# Patient Record
Sex: MALE | Race: WHITE | NOT HISPANIC OR LATINO | Employment: FULL TIME | URBAN - NONMETROPOLITAN AREA
[De-identification: names, ages, dates, MRNs, and addresses within clinical notes are randomized per-mention and may not be internally consistent; named-entity substitution may affect disease eponyms.]

---

## 2017-01-04 ENCOUNTER — TREATMENT (OUTPATIENT)
Dept: PHYSICAL THERAPY | Facility: CLINIC | Age: 42
End: 2017-01-04

## 2017-01-04 DIAGNOSIS — M54.40 LOW BACK PAIN WITH SCIATICA, SCIATICA LATERALITY UNSPECIFIED, UNSPECIFIED BACK PAIN LATERALITY, UNSPECIFIED CHRONICITY: Primary | ICD-10-CM

## 2017-01-04 PROCEDURE — 97014 ELECTRIC STIMULATION THERAPY: CPT | Performed by: PHYSICAL THERAPIST

## 2017-01-04 PROCEDURE — 97012 MECHANICAL TRACTION THERAPY: CPT | Performed by: PHYSICAL THERAPIST

## 2017-01-04 PROCEDURE — 97110 THERAPEUTIC EXERCISES: CPT | Performed by: PHYSICAL THERAPIST

## 2017-01-04 NOTE — MR AVS SNAPSHOT
Suhail Gamino   2017 2:45 PM   Treatment    Dept Phone:  [phone]   Encounter #:  [id number]    Provider:  Rodriguez Mauricio PTA   Department:  [facility] PHYSICAL THERAPY                Your Full Care Plan              Your Updated Medication List          This list is accurate as of: 17  4:07 PM.  Always use your most recent med list.                clonazePAM 2 MG tablet   Commonly known as:  KlonoPIN   Take 1 tablet by mouth 3 (Three) Times a Day.       cyclobenzaprine 10 MG tablet   Commonly known as:  FLEXERIL   TAKE 1 TABLET BY MOUTH THREE TIMES A DAY AS NEEDED       fexofenadine 180 MG tablet   Commonly known as:  ALLEGRA       HYDROcodone-acetaminophen 7.5-325 MG per tablet   Commonly known as:  NORCO   Take 1 tablet by mouth Every 6 (Six) Hours As Needed for moderate pain (4-6).       meloxicam 15 MG tablet   Commonly known as:  MOBIC   TAKE 1 TABLET BY MOUTH DAILY               You Were Diagnosed With        Codes Comments    Low back pain with sciatica, sciatica laterality unspecified, unspecified back pain laterality, unspecified chronicity    -  Primary ICD-10-CM: M54.40  ICD-9-CM: 724.3       Instructions     None    Patient Instructions History      Upcoming Appointments     Visit Type Date Time Department    TREATMENT 2017  2:45 PM [facility]    TREATMENT 2017  2:45 PM [facility]    OFFICE VISIT 2/3/2017  3:00 PM [facility] GAVINO JEWELL      Fantom Signup     [facility] Fantom allows you to send messages to your doctor, view your test results, renew your prescriptions, schedule appointments, and more. To sign up, go to [facility] and click on the Sign Up Now link in the New User? box. Enter your Fantom Activation Code exactly as it appears below along with the last four digits of your Social Security Number and your Date of Birth () to complete the sign-up process. If you do not sign up before the  expiration date, you must request a new code.    [employer]pple Activation Code: [email or web address]  Expires: 1/18/2017  2:57 PM    If you have questions, you can email [email or web address] or call [phone] to talk to our Burpple staff. Remember, [employer] is NOT to be used for urgent needs. For medical emergencies, dial 911.               Other Info from Your Visit           Your Appointments     Jan 06, 2017  2:45 PM CST   Therapy Treatment with Rodriguez Mauricio PTA   [facility] PHYSICAL THERAPY (--)    [facility] PHYSICAL THERAPY (--)   [phone]            Feb 03, 2017  3:00 PM CST   Office Visit with Andres Carrera MD   [facility] (--)    [address] [place] [address] [phone]           Arrive 15 minutes prior to appointment.              Allergies     No Known Allergies      Vital Signs     Smoking Status                   Former Smoker           Problems and Diagnoses Noted     Lumbago with sciatica    -  Primary

## 2017-01-04 NOTE — PROGRESS NOTES
"Daily Progress Note    Time In: 3:07      Time Out: 4:08    ICD-10-CM ICD-9-CM   1. Low back pain with sciatica, sciatica laterality unspecified, unspecified back pain laterality, unspecified chronicity M54.40 724.3       Subjective   Pt c/o LBP into L glute area. He has been working.   pain is perceived as severe (6-8 pain scale)      Objective     Reported body wt of 309#. TTP at L PS, QL area. V cues necessary for correct TE performance.    PROCEDURES AND MODALITIES:       Electrical Stimulation  Stimulation Type: IFC  Max mAmp: 15  Location/Electrode Placement/Other: LB  Rx Minutes: 15 mins    Traction 40998  Traction Type: Lumbar  Rx Minutes: 12  Duration: Intermittent  Position: Hook-lying  Weight: 140 (relax 80#)  Hold: 60  Relax: 10    EXERCISE  Exercise 1  Exercise Name 1: LTR  Sets/Reps 1: 2/20\"  Exercise 1 Completed: Yes  Exercise 2  Exercise Name 2: B lat hang stretch  Sets/Reps 2: 3/15\"  Exercise 2 Completed: Yes Exercise 3  Exercise Name 3: Prone heel squeeze  Sets/Reps 3: 20x  Exercise 3 Completed: Yes Exercise 4  Exercise Name 4: STM with bal  Sets/Reps 4: 3 min  Exercise 4 Completed: Yes Exercise 5  Exercise Name 5: L SKC  Sets/Reps 5: 2/30sec  Exercise 5 Completed: Yes Exercise 6  Exercise Name 6: B joseluis stretch  Sets/Reps 6: 2/30\"  Exercise 6 Completed: Yes                                               Therapy Exercise 89733 34 minutes and Other Procedure CPT 27 minutes Traction and electrical Stimulation    Total Treatment Time: 61 Minutes    Assessment/Plan   Good tolerance of today's treatment. Good mary of added traction. Continued LB reactivity. Pain decreased post treatment,   Progress per Plan of Care             Rodriguez Mauricio, PTA  Physical Therapist    "

## 2017-01-06 ENCOUNTER — TREATMENT (OUTPATIENT)
Dept: PHYSICAL THERAPY | Facility: CLINIC | Age: 42
End: 2017-01-06

## 2017-01-06 DIAGNOSIS — M54.40 LOW BACK PAIN WITH SCIATICA, SCIATICA LATERALITY UNSPECIFIED, UNSPECIFIED BACK PAIN LATERALITY, UNSPECIFIED CHRONICITY: Primary | ICD-10-CM

## 2017-01-06 PROCEDURE — 97110 THERAPEUTIC EXERCISES: CPT | Performed by: PHYSICAL THERAPIST

## 2017-01-06 PROCEDURE — 97012 MECHANICAL TRACTION THERAPY: CPT | Performed by: PHYSICAL THERAPIST

## 2017-01-06 PROCEDURE — 97014 ELECTRIC STIMULATION THERAPY: CPT | Performed by: PHYSICAL THERAPIST

## 2017-01-06 NOTE — PROGRESS NOTES
"Daily Progress Note    Time In: 2:40      Time Out: 3:35    ICD-10-CM ICD-9-CM   1. Low back pain with sciatica, sciatica laterality unspecified, unspecified back pain laterality, unspecified chronicity M54.40 724.3       Subjective   Pt reports 5/10 pain today at same area. One episode of neuro symptom all the way down leg when stepping down out of truck. This resolved quickly and these are happening less frequently.       Objective     V cues necessary for correct TE performance. No c/o increased pain with TE or traction. Pain decreased post treatment.       PROCEDURES AND MODALITIES:     Electrical Stimulation  Stimulation Type: IFC  Max mAmp: 15  Location/Electrode Placement/Other: LB  Rx Minutes: 15 mins       Traction 68107  Traction Type: Lumbar  Rx Minutes: 12  Duration: Intermittent  Position: Hook-lying  Weight: 142  Hold: 60  Relax: 10    EXERCISE  Exercise 1  Exercise Name 1: B lat hang stretch  Sets/Reps 1: 3/15\"  Exercise 1 Completed: Yes  Exercise 2  Exercise Name 2: Kneeling L ant hip stretch  Sets/Reps 2: 2/30\"  Exercise 2 Completed: Yes Exercise 3  Exercise Name 3: L piriformis stretch  Sets/Reps 3: 2/30\"  Exercise 3 Completed: Yes Exercise 4  Exercise Name 4: Bridge with add squeeze  Sets/Reps 4: 30x  Exercise 4 Completed: Yes Exercise 5  Exercise Name 5: 1/2 H/L ab curl with hold  Sets/Reps 5: 2/10x  Exercise 5 Completed: Yes Exercise 6  Exercise Name 6: Quad alt hip ext with hold  Sets/Reps 6: 12xea  Exercise 6 Completed: Yes                                               Therapy Exercise 75653 28 minutes and Other Procedure CPT 27 minutes Traction and Electrical Stimulation    Total Treatment Time: 55 Minutes    Assessment/Plan   Good tolerance of today's treatment. Able to increase traction and TE intensity with no problems. LE symptom frequency improved.   Progress per Plan of Care             Rodriguez Mauricio, PTA  Physical Therapist    "

## 2017-01-09 ENCOUNTER — TREATMENT (OUTPATIENT)
Dept: PHYSICAL THERAPY | Facility: CLINIC | Age: 42
End: 2017-01-09

## 2017-01-09 DIAGNOSIS — M54.40 ACUTE MIDLINE LOW BACK PAIN WITH SCIATICA, SCIATICA LATERALITY UNSPECIFIED: Primary | ICD-10-CM

## 2017-01-09 PROCEDURE — 97110 THERAPEUTIC EXERCISES: CPT | Performed by: PHYSICAL THERAPIST

## 2017-01-09 PROCEDURE — 97014 ELECTRIC STIMULATION THERAPY: CPT | Performed by: PHYSICAL THERAPIST

## 2017-01-09 PROCEDURE — 97012 MECHANICAL TRACTION THERAPY: CPT | Performed by: PHYSICAL THERAPIST

## 2017-01-09 NOTE — PROGRESS NOTES
"Daily Progress Note    Time In: 1510      Time Out: 1625    ICD-10-CM ICD-9-CM   1. Acute midline low back pain with sciatica, sciatica laterality unspecified M54.40 724.2     724.3       Subjective   Pt states his pain is in low back and L hip.  Patient reports to therapy 4/10 pain on numerical analogue scale, and   0% improvement.  Attendance  5/6 visits. 6 approved by insurance.      Objective    NAD, TTP to L piriformis    AROM:           PROCEDURES AND MODALITIES:                 Electrical Stimulation  Stimulation Type: IFC  Location/Electrode Placement/Other: LB  Rx Minutes: 15 mins              Traction 29634  Traction Type: Lumbar  Rx Minutes: 15  Duration: Intermittent  Position: Hook-lying  Weight: 75  Hold: 60  Relax: 10    EXERCISE  Exercise 1  Exercise Name 1: Pro 2  Equipment/Resistance 1: L4  Time: 8 min  Exercise 1 Completed: Yes  Exercise 2  Exercise Name 2: bridge with ADD  Sets/Reps 2: 20  Time 2: 5\" hold  Exercise 2 Completed: Yes Exercise 3  Exercise Name 3: LTR with ADD  Sets/Reps 3: 20x  Time 3: 5\" hold  Exercise 3 Completed: Yes Exercise 4  Exercise Name 4: ST Hs stretch  Sets/Reps 4: 3x30\"  Exercise 4 Completed: Yes Exercise 5  Exercise Name 5: L joseluis stretch  Sets/Reps 5: 3x30  Exercise 5 Completed: Yes Exercise 6  Exercise Name 6: DKTC with PB  Sets/Reps 6: 20x  Exercise 6 Completed: Yes   Exercise 7  Exercise Name 7: SLR L  Sets/Reps 7: 2x10  Exercise 7 Completed: Yes Exercise 8  Exercise Name 8: prone heel squeeze  Sets/Reps 8: 20x  Exercise 8 Completed: Yes                                       MANUAL PT:                           Therapy Exercise 62197 40 minutes    Total Treatment Time: 75 Minutes    Assessment/Plan    Pt reports compliant with HEP.  No adverse effects with new exercise this date. Pt progressing well to meet unmet goals. Pt gets some relief from traction.  Progress per Plan of Care   Last approved visit           Charly Espinal PTA  Physical Therapist  Jeni MUNOZ " José Miguel, PT, ATC, DPT

## 2017-01-09 NOTE — MR AVS SNAPSHOT
Suhail Gamino   2017 2:30 PM   Treatment    Dept Phone:  [phone]   Encounter #:  [id number]    Provider:  PTA FLOAT POW   Department:  [facility] PHYSICAL THERAPY                Your Full Care Plan              Your Updated Medication List          This list is accurate as of: 17  4:24 PM.  Always use your most recent med list.                clonazePAM 2 MG tablet   Commonly known as:  KlonoPIN   Take 1 tablet by mouth 3 (Three) Times a Day.       cyclobenzaprine 10 MG tablet   Commonly known as:  FLEXERIL   TAKE 1 TABLET BY MOUTH THREE TIMES A DAY AS NEEDED       fexofenadine 180 MG tablet   Commonly known as:  ALLEGRA       HYDROcodone-acetaminophen 7.5-325 MG per tablet   Commonly known as:  NORCO   Take 1 tablet by mouth Every 6 (Six) Hours As Needed for moderate pain (4-6).       meloxicam 15 MG tablet   Commonly known as:  MOBIC   TAKE 1 TABLET BY MOUTH DAILY               You Were Diagnosed With        Codes Comments    Acute midline low back pain with sciatica, sciatica laterality unspecified    -  Primary ICD-10-CM: M54.40  ICD-9-CM: 724.2, 724.3       Instructions     None    Patient Instructions History      Upcoming Appointments     Visit Type Date Time Department    TREATMENT 2017  2:30 PM [facility]    TREATMENT 2017  2:30 PM [facility]    OFFICE VISIT 2/3/2017  3:00 PM [facility] GAVINO JEWELL      BeDo Signup     [facility] BeDo allows you to send messages to your doctor, view your test results, renew your prescriptions, schedule appointments, and more. To sign up, go to [employer] and click on the Sign Up Now link in the New User? box. Enter your BeDo Activation Code exactly as it appears below along with the last four digits of your Social Security Number and your Date of Birth () to complete the sign-up process. If you do not sign up before the expiration date, you must request a new  code.    SafeTool Activation Code: [email or web address]  Expires: 1/18/2017  2:57 PM    If you have questions, you can email [facility] Care[facility]'s Hospital at [email or web address] or call [phone] to talk to our Buildingeyet staff. Remember, leemailhart is NOT to be used for urgent needs. For medical emergencies, dial 911.               Other Info from Your Visit           Your Appointments     Jan 11, 2017  2:30 PM CST   Therapy Treatment with Rodriguez Mauricio PTA   [facility] PHYSICAL THERAPY (--)    [facility] PHYSICAL THERAPY (--)   [phone]            Feb 03, 2017  3:00 PM CST   Office Visit with Andres Carrera MD   [facility] (--)    [facility] [address] [place] [address] [phone]           Arrive 15 minutes prior to appointment.              Allergies     No Known Allergies      Vital Signs     Smoking Status                   Former Smoker           Problems and Diagnoses Noted     Acute midline low back pain with sciatica, sciatica laterality unspecified    -  Primary

## 2017-01-11 ENCOUNTER — TREATMENT (OUTPATIENT)
Dept: PHYSICAL THERAPY | Facility: CLINIC | Age: 42
End: 2017-01-11

## 2017-01-11 DIAGNOSIS — M54.40 LOW BACK PAIN WITH SCIATICA, SCIATICA LATERALITY UNSPECIFIED, UNSPECIFIED BACK PAIN LATERALITY, UNSPECIFIED CHRONICITY: ICD-10-CM

## 2017-01-11 DIAGNOSIS — M54.40 ACUTE MIDLINE LOW BACK PAIN WITH SCIATICA, SCIATICA LATERALITY UNSPECIFIED: Primary | ICD-10-CM

## 2017-01-11 PROCEDURE — 97012 MECHANICAL TRACTION THERAPY: CPT | Performed by: PHYSICAL THERAPIST

## 2017-01-11 PROCEDURE — 97014 ELECTRIC STIMULATION THERAPY: CPT | Performed by: PHYSICAL THERAPIST

## 2017-01-11 PROCEDURE — 97110 THERAPEUTIC EXERCISES: CPT | Performed by: PHYSICAL THERAPIST

## 2017-01-11 NOTE — MR AVS SNAPSHOT
Suhail Gamino   1/11/2017 2:30 PM   Treatment    Dept Phone:  [phone]   Encounter #:  [id number]    Provider:  Rodriguez Mauricio PTA   Department:  [facility] PHYSICAL THERAPY                Your Full Care Plan              Your Updated Medication List          This list is accurate as of: 1/11/17  4:20 PM.  Always use your most recent med list.                clonazePAM 2 MG tablet   Commonly known as:  KlonoPIN   Take 1 tablet by mouth 3 (Three) Times a Day.       cyclobenzaprine 10 MG tablet   Commonly known as:  FLEXERIL   TAKE 1 TABLET BY MOUTH THREE TIMES A DAY AS NEEDED       fexofenadine 180 MG tablet   Commonly known as:  ALLEGRA       HYDROcodone-acetaminophen 7.5-325 MG per tablet   Commonly known as:  NORCO   Take 1 tablet by mouth Every 6 (Six) Hours As Needed for moderate pain (4-6).       meloxicam 15 MG tablet   Commonly known as:  MOBIC   TAKE 1 TABLET BY MOUTH DAILY               You Were Diagnosed With        Codes Comments    Acute midline low back pain with sciatica, sciatica laterality unspecified    -  Primary ICD-10-CM: M54.40  ICD-9-CM: 724.2, 724.3     Low back pain with sciatica, sciatica laterality unspecified, unspecified back pain laterality, unspecified chronicity     ICD-10-CM: M54.40  ICD-9-CM: 724.3       Instructions     None    Patient Instructions History      Upcoming Appointments     Visit Type Date Time Department    TREATMENT 1/11/2017  2:30 PM [facility]    OFFICE VISIT 2/3/2017  3:00 PM [facility] GAVINO JEWELL      [employer] Signup     [facility] OurStory allows you to send messages to your doctor, view your test results, renew your prescriptions, schedule appointments, and more. To sign up, go to [employer] and click on the Sign Up Now link in the New User? box. Enter your OurStory Activation Code exactly as it appears below along with the last four digits of your Social Security Number and your Date of  Birth () to complete the sign-up process. If you do not sign up before the expiration date, you must request a new code.    [employer] Activation Code: [email or web address]  Expires: 2017  2:57 PM    If you have questions, you can email [email or web address] or call [phone] to talk to our [employer] staff. Remember, [employer] is NOT to be used for urgent needs. For medical emergencies, dial 911.               Other Info from Your Visit           Your Appointments     2017  3:00 PM CST   Office Visit with Andres Carrera MD   [facility] FAMILY MEDICINE (--)    [address] [place] [address] [phone]           Arrive 15 minutes prior to appointment.              Allergies     No Known Allergies      Vital Signs     Smoking Status                   Former Smoker           Problems and Diagnoses Noted     Acute midline low back pain with sciatica, sciatica laterality unspecified    -  Primary    Lumbago with sciatica

## 2017-01-11 NOTE — PROGRESS NOTES
"Daily Progress Note    Time In: 3:00      Time Out: 3:58    ICD-10-CM ICD-9-CM   1. Acute midline low back pain with sciatica, sciatica laterality unspecified M54.40 724.2     724.3   2. Low back pain with sciatica, sciatica laterality unspecified, unspecified back pain laterality, unspecified chronicity M54.40 724.3       Subjective   Pt reports mild L hip symptom once today with getting out of truck. His overall LE symptoms have greatly improved.   pain is perceived as mild(1-3  pain scale)      Objective     V cues necessary for correct TE performance. No c/o increased symptoms with or post TE.       PROCEDURES AND MODALITIES:       Electrical Stimulation  Stimulation Type: IFC  Location/Electrode Placement/Other: LB  Rx Minutes: Other: (12 min)       Traction 08633  Traction Type: Lumbar  Rx Minutes: 14  Position: Hook-lying  Weight: 145  Hold: 60  Relax: 10    EXERCISE  Exercise 1  Exercise Name 1: L piriformis stretch  Sets/Reps 1: 2/30 sec  Exercise 1 Completed: Yes  Exercise 2  Exercise Name 2: LTR  Sets/Reps 2: 2/30\"  Exercise 2 Completed: Yes Exercise 3  Exercise Name 3: PPU  Sets/Reps 3: 2/10x  Exercise 3 Completed: Yes Exercise 4  Exercise Name 4: 1/2 H/L ab curl  Sets/Reps 4: 2/10x  Exercise 4 Completed: Yes Exercise 5  Exercise Name 5: Quad alt hip ext  Equipment/Resistance 5: with hold  Sets/Reps 5: 12x ea  Exercise 5 Completed: Yes Exercise 6  Exercise Name 6: Core anti rot  Sets/Reps 6: 10xea  Exercise 6 Completed: Yes   Exercise 7  Exercise Name 7: ST HS stretch  Sets/Reps 7: 1 min ea  Exercise 7 Completed: Yes                                         Therapy Exercise 59765 32 minutes and Other Procedure CPT 26 minutes Traction and Electrical Stimulation    Total Treatment Time: 58 Minutes    Assessment/Plan   Good tolerance of today's treatment. Neuro symptom improved along with pain.    Progress per Plan of Care, when visits approved.              Rodriguez Mauricio PTA  Physical Therapist    "

## 2017-01-18 ENCOUNTER — TREATMENT (OUTPATIENT)
Dept: PHYSICAL THERAPY | Facility: CLINIC | Age: 42
End: 2017-01-18

## 2017-01-18 DIAGNOSIS — M54.40 ACUTE MIDLINE LOW BACK PAIN WITH SCIATICA, SCIATICA LATERALITY UNSPECIFIED: Primary | ICD-10-CM

## 2017-01-18 DIAGNOSIS — M54.40 LOW BACK PAIN WITH SCIATICA, SCIATICA LATERALITY UNSPECIFIED, UNSPECIFIED BACK PAIN LATERALITY, UNSPECIFIED CHRONICITY: ICD-10-CM

## 2017-01-18 PROCEDURE — 97014 ELECTRIC STIMULATION THERAPY: CPT | Performed by: PHYSICAL THERAPIST

## 2017-01-18 PROCEDURE — 97110 THERAPEUTIC EXERCISES: CPT | Performed by: PHYSICAL THERAPIST

## 2017-01-18 PROCEDURE — 97012 MECHANICAL TRACTION THERAPY: CPT | Performed by: PHYSICAL THERAPIST

## 2017-01-18 PROCEDURE — 97164 PT RE-EVAL EST PLAN CARE: CPT | Performed by: PHYSICAL THERAPIST

## 2017-01-18 NOTE — PROGRESS NOTES
Recertification    Diagnosis/ICD-10 Code:  Acute midline low back pain with sciatica, sciatica laterality unspecified [M54.40]  Referring practitioner: Andres Carrera MD  Date of Initial Visit: 1/18/2017  Patient seen for 7 sessions  ADDENDUM: Time In:  15:15   Time out:   16:33         Re-cert, Traction,  Therapeutic exercise 13 mins,  Electrical stimulation  Subjective:   Suhail Gamino states: Patient reports 7/10 pain and 50 % improvement since initiating therapy.  Attendance  7/10 appointments scheduled,  10 approved by insurance. Next MD follow up is  2/2017.  Felt great last week, then he had to do repairs under the house on the duct work.  Pain was ED level all week end but he didn't go. Really hurts, thinks the therapy is helping.      Objective:   Current condition: Good  Test measurement: Flexion is 12 inches from the floor, Extension 20 degrees, lateral flexion to the LJL bilaterally.     5/5 MMT hip flexors and abductors.  Assessment:   Summary of Treatment: Traction 150#/ 140 with 60 /10 cycle.  15 min in supine position.  Core stretches, LTR, piriformis stretch.  Ice and e-stim.  Progress toward previous goals: Continue STG/LTG. Goals 2, 3 met and LTG 2,3, 6 are met.     Pain 4/10 post treatment today.   Plan:   Goals  Short-term goals (STG):  Continue remaining goals from initial consult  Long-term goals (LTG):  Continue remaining goals from initial consult    Timeframe: 3 weeks  Prognosis to achieve goals: fair    Plan  Treatment plan with rationale: The patient is to  be seen 2 time(s) per week, for 2 week(s) to progress toward short and long term goals.  Joint mobilizations to decrease pain and/or increase ROM   Therapeutic exercises to increase functional mobility  Perform modalities as therapeutically necessary to decrease pain and increase mobility  Recommendations: Initiate/continue PT services    PT Signature: Jeni Valdez, PT, ATC,DPT      Based upon review of the patient's  progress and continued therapy plan, it is my medical opinion that Suhail Gamino should continue physical therapy treatment at [facility] PHYSICAL THERAPY  [address] [place] [address] [place] [address].    Signature:  Andres Carrera MD

## 2017-01-18 NOTE — MR AVS SNAPSHOT
Suhail Gamino   1/18/2017 2:45 PM   Treatment    Dept Phone:  [phone]   Encounter #:  [id number]    Provider:  Jeni Valdez PT   Department:  [facility] PHYSICAL THERAPY                Your Full Care Plan              Your Updated Medication List          This list is accurate as of: 1/18/17  4:33 PM.  Always use your most recent med list.                clonazePAM 2 MG tablet   Commonly known as:  KlonoPIN   Take 1 tablet by mouth 3 (Three) Times a Day.       cyclobenzaprine 10 MG tablet   Commonly known as:  FLEXERIL   TAKE 1 TABLET BY MOUTH THREE TIMES A DAY AS NEEDED       fexofenadine 180 MG tablet   Commonly known as:  ALLEGRA       HYDROcodone-acetaminophen 7.5-325 MG per tablet   Commonly known as:  NORCO   Take 1 tablet by mouth Every 6 (Six) Hours As Needed for moderate pain (4-6).       meloxicam 15 MG tablet   Commonly known as:  MOBIC   TAKE 1 TABLET BY MOUTH DAILY               You Were Diagnosed With        Codes Comments    Acute midline low back pain with sciatica, sciatica laterality unspecified    -  Primary ICD-10-CM: M54.40  ICD-9-CM: 724.2, 724.3     Low back pain with sciatica, sciatica laterality unspecified, unspecified back pain laterality, unspecified chronicity     ICD-10-CM: M54.40  ICD-9-CM: 724.3       Instructions     None    Patient Instructions History      Upcoming Appointments     Visit Type Date Time Department    RE-EVALUATION 1/18/2017  2:45 PM [facility]    TREATMENT 1/27/2017  1:45 PM [facility]    OFFICE VISIT 2/3/2017  3:00 PM [facility] GAVINO JEWELL      [place] CK[employer]avet Signup     [facility] [employer] Data[employer]Media allows you to send messages to your doctor, view your test results, renew your prescriptions, schedule appointments, and more. To sign up, go to [employer] and click on the Sign Up Now link in the New User? box. Enter your [employer] Activation Code exactly as it appears below along with the last  four digits of your Social Security Number and your Date of Birth () to complete the sign-up process. If you do not sign up before the expiration date, you must request a new code.    [employer] Activation Code: [email or web address]  Expires: 2017  4:33 PM    If you have questions, you can email [email or web address] or call [phone] to talk to our [employer] staff. Remember, [employer] is NOT to be used for urgent needs. For medical emergencies, dial 911.               Other Info from Your Visit           Your Appointments     2017  1:45 PM CST   Therapy Treatment with PTA FLOAT POW   [facility] PHYSICAL THERAPY (--)    [facility] PHYSICAL THERAPY (--)   [phone]            2017  3:00 PM CST   Office Visit with Andres aCrrera MD   [facility] (--)    [address] [place] [address] [phone]           Arrive 15 minutes prior to appointment.              Allergies     No Known Allergies      Reason for Visit     Lower Back - Pain     PT Re-Evaluation           Vital Signs     Smoking Status                   Former Smoker           Problems and Diagnoses Noted     Acute midline low back pain with sciatica, sciatica laterality unspecified    -  Primary    Lumbago with sciatica

## 2017-01-25 RX ORDER — HYDROCODONE BITARTRATE AND ACETAMINOPHEN 7.5; 325 MG/1; MG/1
1 TABLET ORAL EVERY 6 HOURS PRN
Qty: 120 TABLET | Refills: 0 | Status: SHIPPED | OUTPATIENT
Start: 2017-01-25 | End: 2017-03-20 | Stop reason: SDUPTHER

## 2017-01-27 ENCOUNTER — TREATMENT (OUTPATIENT)
Dept: PHYSICAL THERAPY | Facility: CLINIC | Age: 42
End: 2017-01-27

## 2017-01-27 DIAGNOSIS — M54.40 ACUTE MIDLINE LOW BACK PAIN WITH SCIATICA, SCIATICA LATERALITY UNSPECIFIED: Primary | ICD-10-CM

## 2017-01-27 DIAGNOSIS — M54.40 LOW BACK PAIN WITH SCIATICA, SCIATICA LATERALITY UNSPECIFIED, UNSPECIFIED BACK PAIN LATERALITY, UNSPECIFIED CHRONICITY: ICD-10-CM

## 2017-01-27 PROCEDURE — 97014 ELECTRIC STIMULATION THERAPY: CPT | Performed by: PHYSICAL THERAPIST

## 2017-01-27 PROCEDURE — 97012 MECHANICAL TRACTION THERAPY: CPT | Performed by: PHYSICAL THERAPIST

## 2017-01-27 PROCEDURE — 97110 THERAPEUTIC EXERCISES: CPT | Performed by: PHYSICAL THERAPIST

## 2017-01-27 NOTE — PROGRESS NOTES
Daily Progress Note    Time In: 1406      Time Out: 1512    ICD-10-CM ICD-9-CM   1. Acute midline low back pain with sciatica, sciatica laterality unspecified M54.40 724.2     724.3   2. Low back pain with sciatica, sciatica laterality unspecified, unspecified back pain laterality, unspecified chronicity M54.40 724.3       Subjective   Pt reports that his pain is just in the low back and not in the legs.  Patient reports to therapy 4/10 pain on numerical analogue scale, and   80% improvement.  Attendance  8/10 visits.       Objective    NAG, Lumbar flexion 5 inches from floor, lumbar ext 18°    AROM:                                  PROCEDURES AND MODALITIES:            Ice  Ice Applied: Yes  Location: LB  Rx Minutes: 15 mins  Ice S/P Rx: Yes    Electrical Stimulation  Stimulation Type: IFC  Location/Electrode Placement/Other: LB  Rx Minutes: 15 mins              Traction 07204  Traction Type: Lumbar  Rx Minutes: 14  Duration: Intermittent  Position: Hook-lying  Weight: 145  Hold: 60  Relax: 10    EXERCISE  Exercise 1  Exercise Name 1: Pro 2   Equipment/Resistance 1: L4  Time: 5 min  Exercise 1 Completed: Yes  Exercise 2  Exercise Name 2: bridge  Sets/Reps 2: 10x  Time 2: 10 sec hold  Exercise 2 Completed: Yes Exercise 3  Exercise Name 3: BKLL  Sets/Reps 3: 10  Time 3: 5 sec hold  Exercise 3 Completed: Yes Exercise 4  Exercise Name 4: seated HS stretch  Time 4: 1 min  Exercise 4 Completed: Yes Exercise 5  Exercise Name 5: prone hip ext  Sets/Reps 5: 2x10  Exercise 5 Completed: Yes Exercise 6  Exercise Name 6: prone heel squeeze  Sets/Reps 6: 20  Exercise 6 Completed: Yes   Exercise 7  Exercise Name 7: quadraped ALT LE ext  Sets/Reps 7: 20  Exercise 7 Completed: Yes                                         MANUAL PT:                    Therapy Exercise 16330 49 minutes    Total Treatment Time: 66 Minutes    Assessment/Plan    No adverse effects with new exercise this date. Pt reports compliant with HEP. Pt progressing  well to meet unmet goals.  Pt met STG #1 and 2 this date.  Progress per Plan of Care             Charly Espinal, PTA  Physical Therapist

## 2017-01-27 NOTE — MR AVS SNAPSHOT
Suhail Gamino   1/27/2017 1:45 PM   Treatment    Dept Phone:  [phone]   Encounter #:  [id number]    Provider:  PTA FLOAT POW   Department:  [facility]                Your Full Care Plan              Your Updated Medication List          This list is accurate as of: 1/27/17  3:11 PM.  Always use your most recent med list.                clonazePAM 2 MG tablet   Commonly known as:  KlonoPIN   Take 1 tablet by mouth 3 (Three) Times a Day.       cyclobenzaprine 10 MG tablet   Commonly known as:  FLEXERIL   TAKE 1 TABLET BY MOUTH THREE TIMES A DAY AS NEEDED       fexofenadine 180 MG tablet   Commonly known as:  ALLEGRA       HYDROcodone-acetaminophen 7.5-325 MG per tablet   Commonly known as:  NORCO   Take 1 tablet by mouth Every 6 (Six) Hours As Needed for moderate pain (4-6).       meloxicam 15 MG tablet   Commonly known as:  MOBIC   TAKE 1 TABLET BY MOUTH DAILY               You Were Diagnosed With        Codes Comments    Acute midline low back pain with sciatica, sciatica laterality unspecified    -  Primary ICD-10-CM: M54.40  ICD-9-CM: 724.2, 724.3     Low back pain with sciatica, sciatica laterality unspecified, unspecified back pain laterality, unspecified chronicity     ICD-10-CM: M54.40  ICD-9-CM: 724.3       Instructions     None    Patient Instructions History      Upcoming Appointments     Visit Type Date Time Department    TREATMENT 1/27/2017  1:45 PM [facility]    OFFICE VISIT 2/3/2017  3:00 PM [facility]      [employer] Signup     Our records indicate that you have an active [employer] account.    You can view your After Visit Summary by going to [employer] and logging in with your [facility] username and password.  If you don't have a [facility] [facility] username and password but a parent or guardian has access to your record, the parent or guardian should login with their own [facility] [facility] username and password and  access your record to view the After Visit Summary.    If you have questions, you can email [email or web address] or call [phone] to talk to our [employer] staff.  Remember, [employer] is NOT to be used for urgent needs.  For medical emergencies, dial 911.               Other Info from Your Visit           Your Appointments     Feb 03, 2017  3:00 PM CST   Office Visit with Andres Carrera MD   [facility] (--)    [address] [place] [id number] [phone]           Arrive 15 minutes prior to appointment.              Allergies     No Known Allergies      Vital Signs     Smoking Status                   Former Smoker           Problems and Diagnoses Noted     Acute midline low back pain with sciatica, sciatica laterality unspecified    -  Primary    Lumbago with sciatica

## 2017-02-03 ENCOUNTER — OFFICE VISIT (OUTPATIENT)
Dept: FAMILY MEDICINE CLINIC | Facility: CLINIC | Age: 42
End: 2017-02-03

## 2017-02-03 VITALS
BODY MASS INDEX: 39.17 KG/M2 | WEIGHT: 315 LBS | DIASTOLIC BLOOD PRESSURE: 90 MMHG | HEIGHT: 75 IN | HEART RATE: 95 BPM | OXYGEN SATURATION: 97 % | SYSTOLIC BLOOD PRESSURE: 146 MMHG | TEMPERATURE: 99.3 F

## 2017-02-03 DIAGNOSIS — M16.51 POST-TRAUMATIC OSTEOARTHRITIS OF RIGHT HIP: Primary | ICD-10-CM

## 2017-02-03 DIAGNOSIS — F41.9 ANXIETY: ICD-10-CM

## 2017-02-03 LAB
AMPHET+METHAMPHET UR QL: NEGATIVE
BARBITURATES UR QL SCN: NEGATIVE
BENZODIAZ UR QL SCN: NEGATIVE
CANNABINOIDS SERPL QL: NEGATIVE
COCAINE UR QL: NEGATIVE
METHADONE UR QL SCN: NEGATIVE
OPIATES UR QL: POSITIVE
OXYCODONE UR QL SCN: NEGATIVE

## 2017-02-03 PROCEDURE — 99214 OFFICE O/P EST MOD 30 MIN: CPT | Performed by: FAMILY MEDICINE

## 2017-02-03 PROCEDURE — 80307 DRUG TEST PRSMV CHEM ANLYZR: CPT | Performed by: FAMILY MEDICINE

## 2017-02-03 RX ORDER — CLONAZEPAM 2 MG/1
2 TABLET ORAL 3 TIMES DAILY
Qty: 90 TABLET | Refills: 2 | Status: SHIPPED | OUTPATIENT
Start: 2017-02-03 | End: 2017-04-28 | Stop reason: SDUPTHER

## 2017-02-03 NOTE — PROGRESS NOTES
Subjective   Suhail Gamino is a 41 y.o. male.     Anxiety   Patient reports no chest pain.       Back Pain   This is a recurrent problem. The current episode started more than 1 year ago. The problem occurs constantly. The problem has been waxing and waning since onset. The pain is present in the lumbar spine. The quality of the pain is described as aching and stabbing. The pain radiates to the left knee and left thigh. The pain is at a severity of 7/10. The pain is worse during the day. The symptoms are aggravated by bending, position, standing and twisting. Stiffness is present in the morning and all day. Associated symptoms include leg pain, paresthesias, tingling and weakness. Pertinent negatives include no abdominal pain, bladder incontinence, bowel incontinence, chest pain, dysuria, fever, headaches, numbness, paresis, pelvic pain, perianal numbness or weight loss. Risk factors include obesity.        The following portions of the patient's history were reviewed and updated as appropriate: allergies, current medications, past family history, past medical history, past social history, past surgical history and problem list.    Review of Systems   Constitutional: Negative for fever and weight loss.   HENT: Negative.    Eyes: Negative.    Respiratory: Negative.    Cardiovascular: Negative.  Negative for chest pain.   Gastrointestinal: Negative.  Negative for abdominal pain and bowel incontinence.   Endocrine: Negative.    Genitourinary: Negative.  Negative for bladder incontinence, dysuria and pelvic pain.   Musculoskeletal: Positive for back pain.   Skin: Negative.    Allergic/Immunologic: Negative.    Neurological: Positive for tingling, weakness and paresthesias. Negative for numbness and headaches.   Hematological: Negative.    Psychiatric/Behavioral: Negative.    All other systems reviewed and are negative.      Objective   Physical Exam   Constitutional: He is oriented to person, place, and  time. He appears well-developed and well-nourished.   HENT:   Head: Normocephalic and atraumatic.   Right Ear: External ear normal.   Left Ear: External ear normal.   Nose: Nose normal.   Mouth/Throat: Oropharynx is clear and moist.   Eyes: Conjunctivae and EOM are normal. Pupils are equal, round, and reactive to light.   Neck: Normal range of motion. Neck supple.   Cardiovascular: Normal rate, regular rhythm, normal heart sounds and intact distal pulses.  Exam reveals no gallop and no friction rub.    No murmur heard.  Pulmonary/Chest: Effort normal and breath sounds normal. He has no wheezes. He has no rales.   Abdominal: Soft. Bowel sounds are normal. He exhibits no mass. There is no tenderness. There is no rebound and no guarding.   Musculoskeletal: Normal range of motion.   Neurological: He is alert and oriented to person, place, and time. He has normal reflexes. No cranial nerve deficit. He exhibits normal muscle tone.   Skin: Skin is warm and dry. No rash noted.   Psychiatric: He has a normal mood and affect. His behavior is normal. Judgment and thought content normal.   Nursing note and vitals reviewed.  tree reviewed    Assessment/Plan   Suhail was seen today for anxiety.    Diagnoses and all orders for this visit:    Post-traumatic osteoarthritis of right hip  -     Urine Drug Screen    Anxiety  -     Urine Drug Screen    Other orders  -     clonazePAM (KlonoPIN) 2 MG tablet; Take 1 tablet by mouth 3 (Three) Times a Day.

## 2017-02-09 ENCOUNTER — TREATMENT (OUTPATIENT)
Dept: PHYSICAL THERAPY | Facility: CLINIC | Age: 42
End: 2017-02-09

## 2017-02-09 DIAGNOSIS — M54.40 ACUTE MIDLINE LOW BACK PAIN WITH SCIATICA, SCIATICA LATERALITY UNSPECIFIED: Primary | ICD-10-CM

## 2017-02-09 DIAGNOSIS — M54.40 LOW BACK PAIN WITH SCIATICA, SCIATICA LATERALITY UNSPECIFIED, UNSPECIFIED BACK PAIN LATERALITY, UNSPECIFIED CHRONICITY: ICD-10-CM

## 2017-02-09 NOTE — PROGRESS NOTES
"Daily Progress Note    Time In: 3:02      Time Out: 3:49    ICD-10-CM ICD-9-CM   1. Acute midline low back pain with sciatica, sciatica laterality unspecified M54.40 724.2     724.3   2. Low back pain with sciatica, sciatica laterality unspecified, unspecified back pain laterality, unspecified chronicity M54.40 724.3       Subjective   Pt reports LB mainly feels \" weak\" today. He reports traction did not feel good on last treatment.   Patient reports to therapy 1/10 pain on numerical analogue scale, and 80% improvement.  Attendance  9/12 visits.       Objective    NAG. No complaints during or post traction. V cues necessary for correct TE performance. No increased pain post TE.         PROCEDURES AND MODALITIES:       Traction 24709  Traction Type: Lumbar  Rx Minutes: 15  Duration: Intermittent  Position: Hook-lying  Weight: 145  Hold: 60  Relax: 10    EXERCISE  Exercise 1  Exercise Name 1: TA iso  Equipment/Resistance 1: 5\"  Sets/Reps 1: 15x  Exercise 1 Completed: Yes  Exercise 2  Exercise Name 2: Bridges with GS  Sets/Reps 2: 20x  Exercise 2 Completed: Yes Exercise 3  Exercise Name 3: H/L alt UE/LE iso  Sets/Reps 3: 10x ea  Exercise 3 Completed: Yes Exercise 4  Exercise Name 4: 1/2  H/L abs  Sets/Reps 4: 2/10x  Exercise 4 Completed: Yes Exercise 5  Exercise Name 5: S/L hip abd circles  Sets/Reps 5: 2/10x ea  Exercise 5 Completed: Yes Exercise 6  Exercise Name 6: Cat/Camel  Sets/Reps 6: 20x  Exercise 6 Completed: Yes   Exercise 7  Exercise Name 7: Quad alt UE/LE  Sets/Reps 7: 2/5xea  Exercise 7 Completed: Yes                                         Therapy Exercise 13697 32 minutes and Other Procedure CPT 15 minutes Traction    Total Treatment Time: 47 Minutes    Assessment/Plan   Good tolerance of today's treatment. Strength increased demonstrated by increased exercise intensity today.   Progress per Plan of Care, one more treatment.              Rodriguez Mauricio, PTA  Physical Therapist    "

## 2017-02-16 ENCOUNTER — TREATMENT (OUTPATIENT)
Dept: PHYSICAL THERAPY | Facility: CLINIC | Age: 42
End: 2017-02-16

## 2017-02-16 DIAGNOSIS — M54.40 ACUTE MIDLINE LOW BACK PAIN WITH SCIATICA, SCIATICA LATERALITY UNSPECIFIED: Primary | ICD-10-CM

## 2017-02-16 DIAGNOSIS — M54.40 LOW BACK PAIN WITH SCIATICA, SCIATICA LATERALITY UNSPECIFIED, UNSPECIFIED BACK PAIN LATERALITY, UNSPECIFIED CHRONICITY: ICD-10-CM

## 2017-02-16 NOTE — PROGRESS NOTES
"Daily Progress Note    Time In: 3:10      Time Out: 4:00    ICD-10-CM ICD-9-CM   1. Acute midline low back pain with sciatica, sciatica laterality unspecified M54.40 724.2     724.3   2. Low back pain with sciatica, sciatica laterality unspecified, unspecified back pain laterality, unspecified chronicity M54.40 724.3       Subjective   Pt reports mild LBP. No new reports.   Patient reports to therapy 3/10 pain on numerical analogue scale, and 80+% improvement.  Attendance  10/14 visits.       Objective     V cues necessary for correct TE performance. No c/o increased pain with TE. HEP reviewed.     AROM:    Trunk flex 3 in from floor, ext 20°, B SB to knee lat jt line.       Traction 11141  Traction Type: Lumbar  Rx Minutes: 15  Position: Hook-lying  Weight: 145  Hold: 60  Relax: 10    EXERCISE  Exercise 1  Exercise Name 1: SKC  Sets/Reps 1: 2/30\"  Exercise 1 Completed: Yes  Exercise 2  Exercise Name 2: Cat/Camel  Sets/Reps 2: 10x ea  Exercise 2 Completed: Yes Exercise 3  Exercise Name 3: Bridges with abd  Equipment/Resistance 3: green tb  Sets/Reps 3: 30x  Exercise 3 Completed: Yes Exercise 4  Exercise Name 4: Core press  Equipment/Resistance 4: small SB  Sets/Reps 4: 10x ea  Exercise 4 Completed: Yes Exercise 5  Exercise Name 5: Rope lat pul  Equipment/Resistance 5: 55#  Sets/Reps 5: 30x  Exercise 5 Completed: Yes Exercise 6  Exercise Name 6: Suitcase carry  Equipment/Resistance 6: 30# db  Sets/Reps 6: 6 laps  Exercise 6 Completed: Yes                                             Therapy Exercise 93679 25 minutes and Other Procedure CPT 15 minutes Traction    Total Treatment Time: 40 Minutes    Assessment/Plan   STG's 1-4 met. LTG's 1,2,3,6 met. No further insurance visits. Good overall improvement.   Other. D/C to HEP.              Rodriguez Mauricio PTA  Physical Therapist    "

## 2017-02-16 NOTE — PROGRESS NOTES
Discharge Summary  Discharge Summary from Physical Therapy Report      Date of eval: 12-20-16  Number of Visits: 10/14     Discharge Status of Patient: Pt reported 80+% improvement. No further insurance visits. God overall improvement. Pt compliant with HEP.     Goals: Partially Met    Prognosis: Good    Comments: D/C to HEP.    Date of Discharge: 2-16-17        Rodriguez Mauricio, PTA  Physical Therapist

## 2017-03-20 RX ORDER — HYDROCODONE BITARTRATE AND ACETAMINOPHEN 7.5; 325 MG/1; MG/1
1 TABLET ORAL EVERY 6 HOURS PRN
Qty: 120 TABLET | Refills: 0 | Status: SHIPPED | OUTPATIENT
Start: 2017-03-20 | End: 2017-04-28 | Stop reason: SDUPTHER

## 2017-03-28 RX ORDER — FEXOFENADINE HCL 180 MG/1
TABLET ORAL
Qty: 30 TABLET | Refills: 5 | Status: SHIPPED | OUTPATIENT
Start: 2017-03-28 | End: 2017-09-26 | Stop reason: SDUPTHER

## 2017-04-10 RX ORDER — CLONAZEPAM 2 MG/1
TABLET ORAL
Qty: 90 TABLET | Refills: 2 | OUTPATIENT
Start: 2017-04-10

## 2017-04-28 ENCOUNTER — OFFICE VISIT (OUTPATIENT)
Dept: FAMILY MEDICINE CLINIC | Facility: CLINIC | Age: 42
End: 2017-04-28

## 2017-04-28 VITALS
WEIGHT: 315 LBS | OXYGEN SATURATION: 97 % | TEMPERATURE: 97.6 F | RESPIRATION RATE: 16 BRPM | DIASTOLIC BLOOD PRESSURE: 80 MMHG | BODY MASS INDEX: 39.17 KG/M2 | HEART RATE: 88 BPM | HEIGHT: 75 IN | SYSTOLIC BLOOD PRESSURE: 142 MMHG

## 2017-04-28 DIAGNOSIS — M16.51 POST-TRAUMATIC OSTEOARTHRITIS OF RIGHT HIP: Primary | ICD-10-CM

## 2017-04-28 DIAGNOSIS — F41.9 ANXIETY: ICD-10-CM

## 2017-04-28 PROCEDURE — 99213 OFFICE O/P EST LOW 20 MIN: CPT | Performed by: FAMILY MEDICINE

## 2017-04-28 RX ORDER — HYDROCODONE BITARTRATE AND ACETAMINOPHEN 7.5; 325 MG/1; MG/1
1 TABLET ORAL EVERY 6 HOURS PRN
Qty: 120 TABLET | Refills: 0 | Status: SHIPPED | OUTPATIENT
Start: 2017-04-28 | End: 2017-07-05 | Stop reason: SDUPTHER

## 2017-04-28 RX ORDER — CLONAZEPAM 2 MG/1
2 TABLET ORAL 3 TIMES DAILY
Qty: 90 TABLET | Refills: 2 | Status: SHIPPED | OUTPATIENT
Start: 2017-04-28 | End: 2017-08-01 | Stop reason: SDUPTHER

## 2017-04-28 NOTE — PROGRESS NOTES
Subjective   Suhail Gamino is a 41 y.o. male.   Chief Complaint   Patient presents with   • Anxiety     3 month f/u    • Osteoarthritis     3 month f/u    • Med Refill     lortab and klonipin   • Migraine     would like some migraine med to take at onset       History of Present Illness   Patient with chronic osteoarthritis and anxiety is in today for reevaluation and refill of controlled medications.  States that medications are still effective.  He is tolerating without side effect.  The following portions of the patient's history were reviewed and updated as appropriate: allergies, current medications, past family history, past medical history, past social history, past surgical history and problem list.    Review of Systems   Constitutional: Negative.    Eyes: Negative.    Respiratory: Negative.    Cardiovascular: Negative.    Gastrointestinal: Negative.    Endocrine: Negative.    Genitourinary: Negative.    Musculoskeletal: Positive for arthralgias.   Skin: Negative.    Allergic/Immunologic: Negative.    Neurological: Positive for headaches.   Hematological: Negative.    Psychiatric/Behavioral: The patient is nervous/anxious.    All other systems reviewed and are negative.      Objective   Physical Exam   Constitutional: He is oriented to person, place, and time. He appears well-developed and well-nourished.   HENT:   Head: Normocephalic and atraumatic.   Right Ear: External ear normal.   Left Ear: External ear normal.   Nose: Nose normal.   Mouth/Throat: Oropharynx is clear and moist.   Eyes: Conjunctivae and EOM are normal. Pupils are equal, round, and reactive to light.   Neck: Normal range of motion. Neck supple.   Cardiovascular: Normal rate, regular rhythm, normal heart sounds and intact distal pulses.  Exam reveals no gallop and no friction rub.    No murmur heard.  Pulmonary/Chest: Effort normal and breath sounds normal. He has no wheezes. He has no rales.   Abdominal: Soft. Bowel sounds  are normal. He exhibits no mass. There is no tenderness. There is no rebound and no guarding.   Musculoskeletal:        Right hip: He exhibits decreased range of motion and tenderness.   Neurological: He is alert and oriented to person, place, and time. He has normal reflexes. No cranial nerve deficit. He exhibits normal muscle tone.   Skin: Skin is warm and dry. No rash noted.   Psychiatric: He has a normal mood and affect. His behavior is normal. Judgment and thought content normal.   Nursing note and vitals reviewed.  tree reviewed    Assessment/Plan   Suhail was seen today for anxiety, osteoarthritis, med refill and migraine.    Diagnoses and all orders for this visit:    Post-traumatic osteoarthritis of right hip    Anxiety    Other orders  -     clonazePAM (KlonoPIN) 2 MG tablet; Take 1 tablet by mouth 3 (Three) Times a Day.  -     HYDROcodone-acetaminophen (NORCO) 7.5-325 MG per tablet; Take 1 tablet by mouth Every 6 (Six) Hours As Needed for Moderate Pain (4-6).

## 2017-07-05 RX ORDER — HYDROCODONE BITARTRATE AND ACETAMINOPHEN 7.5; 325 MG/1; MG/1
1 TABLET ORAL EVERY 6 HOURS PRN
Qty: 120 TABLET | Refills: 0 | Status: SHIPPED | OUTPATIENT
Start: 2017-07-05 | End: 2017-08-01

## 2017-07-26 RX ORDER — MELOXICAM 15 MG/1
TABLET ORAL
Qty: 30 TABLET | Refills: 5 | Status: SHIPPED | OUTPATIENT
Start: 2017-07-26 | End: 2018-03-05 | Stop reason: SDUPTHER

## 2017-08-01 ENCOUNTER — OFFICE VISIT (OUTPATIENT)
Dept: FAMILY MEDICINE CLINIC | Facility: CLINIC | Age: 42
End: 2017-08-01

## 2017-08-01 VITALS
WEIGHT: 315 LBS | HEIGHT: 76 IN | TEMPERATURE: 98.4 F | RESPIRATION RATE: 16 BRPM | DIASTOLIC BLOOD PRESSURE: 90 MMHG | BODY MASS INDEX: 38.36 KG/M2 | SYSTOLIC BLOOD PRESSURE: 150 MMHG | HEART RATE: 78 BPM | OXYGEN SATURATION: 97 %

## 2017-08-01 DIAGNOSIS — M16.51 POST-TRAUMATIC OSTEOARTHRITIS OF RIGHT HIP: Primary | ICD-10-CM

## 2017-08-01 DIAGNOSIS — F41.9 ANXIETY: ICD-10-CM

## 2017-08-01 PROCEDURE — 99213 OFFICE O/P EST LOW 20 MIN: CPT | Performed by: FAMILY MEDICINE

## 2017-08-01 RX ORDER — HYDROCODONE BITARTRATE AND ACETAMINOPHEN 7.5; 325 MG/1; MG/1
1 TABLET ORAL EVERY 6 HOURS PRN
Qty: 120 TABLET | Refills: 0 | Status: CANCELLED | OUTPATIENT
Start: 2017-08-01

## 2017-08-01 RX ORDER — CLONAZEPAM 2 MG/1
2 TABLET ORAL 3 TIMES DAILY
Qty: 90 TABLET | Refills: 2 | Status: SHIPPED | OUTPATIENT
Start: 2017-08-01 | End: 2017-11-01 | Stop reason: SDUPTHER

## 2017-08-01 NOTE — PROGRESS NOTES
Subjective   Suhail Gamino is a 42 y.o. male.   Chief Complaint   Patient presents with   • Anxiety     3 mo controlled requesting refill on Clonazepam   • Osteoarthritis     3 mo controlled requesting refill on Hydrocodone       History of Present Illness   Patient with chronic osteoarthritis and anxiety is in today for reevaluation and is requesting refill of controlled medications.  She states she is doing fairly well, continuing to remain active and employed.  He is tolerating medications without side effect.    The following portions of the patient's history were reviewed and updated as appropriate: allergies, current medications, past family history, past medical history, past social history, past surgical history and problem list.    Review of Systems   Constitutional: Negative.    HENT: Negative.    Eyes: Negative.    Respiratory: Negative.    Cardiovascular: Negative.    Gastrointestinal: Negative.    Endocrine: Negative.    Genitourinary: Negative.    Musculoskeletal: Positive for arthralgias and back pain.   Skin: Negative.    Allergic/Immunologic: Negative.    Neurological: Negative.    Hematological: Negative.    Psychiatric/Behavioral: The patient is nervous/anxious.    All other systems reviewed and are negative.      Objective   Physical Exam   Constitutional: He is oriented to person, place, and time. He appears well-developed and well-nourished.   HENT:   Head: Normocephalic and atraumatic.   Right Ear: External ear normal.   Left Ear: External ear normal.   Nose: Nose normal.   Mouth/Throat: Oropharynx is clear and moist.   Eyes: Conjunctivae and EOM are normal. Pupils are equal, round, and reactive to light.   Neck: Normal range of motion. Neck supple.   Cardiovascular: Normal rate, regular rhythm, normal heart sounds and intact distal pulses.  Exam reveals no gallop and no friction rub.    No murmur heard.  Pulmonary/Chest: Effort normal and breath sounds normal. He has no wheezes.  He has no rales.   Abdominal: Soft. Bowel sounds are normal. He exhibits no mass. There is no tenderness. There is no rebound and no guarding.   Musculoskeletal:        Right hip: He exhibits decreased range of motion and tenderness.   Neurological: He is alert and oriented to person, place, and time. He has normal reflexes. No cranial nerve deficit. He exhibits normal muscle tone.   Skin: Skin is warm and dry. No rash noted.   Psychiatric: He has a normal mood and affect. His behavior is normal. Judgment and thought content normal.   Nursing note and vitals reviewed.  Navjot reviewed    Assessment/Plan   Suhail was seen today for anxiety and osteoarthritis.    Diagnoses and all orders for this visit:    Post-traumatic osteoarthritis of right hip    Anxiety    Other orders  -     Cancel: HYDROcodone-acetaminophen (NORCO) 7.5-325 MG per tablet; Take 1 tablet by mouth Every 6 (Six) Hours As Needed for Moderate Pain (4-6).  -     clonazePAM (KlonoPIN) 2 MG tablet; Take 1 tablet by mouth 3 (Three) Times a Day.        After once again discussing interaction between benzodiazepines and opiates, patient has elected to taper off of the opiates at this time.

## 2017-09-27 RX ORDER — FEXOFENADINE HCL 180 MG/1
TABLET ORAL
Qty: 30 TABLET | Refills: 5 | Status: SHIPPED | OUTPATIENT
Start: 2017-09-27 | End: 2018-04-04 | Stop reason: SDUPTHER

## 2017-11-01 ENCOUNTER — OFFICE VISIT (OUTPATIENT)
Dept: FAMILY MEDICINE CLINIC | Facility: CLINIC | Age: 42
End: 2017-11-01

## 2017-11-01 VITALS
DIASTOLIC BLOOD PRESSURE: 90 MMHG | WEIGHT: 315 LBS | HEART RATE: 72 BPM | SYSTOLIC BLOOD PRESSURE: 140 MMHG | BODY MASS INDEX: 38.36 KG/M2 | RESPIRATION RATE: 16 BRPM | OXYGEN SATURATION: 97 % | TEMPERATURE: 98.6 F | HEIGHT: 76 IN

## 2017-11-01 DIAGNOSIS — F41.9 ANXIETY: Primary | ICD-10-CM

## 2017-11-01 PROCEDURE — 99213 OFFICE O/P EST LOW 20 MIN: CPT | Performed by: FAMILY MEDICINE

## 2017-11-01 RX ORDER — CLONAZEPAM 2 MG/1
2 TABLET ORAL 3 TIMES DAILY
Qty: 90 TABLET | Refills: 2 | Status: SHIPPED | OUTPATIENT
Start: 2017-11-01 | End: 2018-01-30 | Stop reason: SDUPTHER

## 2017-11-01 NOTE — PROGRESS NOTES
Subjective   Suhail Gamino is a 42 y.o. male.   Chief Complaint   Patient presents with   • Anxiety     control refill on Clonazepam       Anxiety   Presents for follow-up visit. Symptoms include nervous/anxious behavior. Patient reports no chest pain, decreased concentration, depressed mood, excessive worry, insomnia, nausea or panic. Symptoms occur most days. The severity of symptoms is mild. The quality of sleep is good. Nighttime awakenings: occasional.     Compliance with medications is %. Treatment side effects: none.        The following portions of the patient's history were reviewed and updated as appropriate: allergies, current medications, past family history, past medical history, past social history, past surgical history and problem list.    Review of Systems   Constitutional: Negative.    HENT: Negative.    Eyes: Negative.    Respiratory: Negative.    Cardiovascular: Negative.  Negative for chest pain.   Gastrointestinal: Negative.  Negative for nausea.   Endocrine: Negative.    Genitourinary: Negative.    Musculoskeletal: Positive for arthralgias.   Skin: Negative.    Allergic/Immunologic: Negative.    Neurological: Negative.    Hematological: Negative.    Psychiatric/Behavioral: Negative for decreased concentration. The patient is nervous/anxious. The patient does not have insomnia.    All other systems reviewed and are negative.      Objective   Physical Exam   Constitutional: He is oriented to person, place, and time. He appears well-developed and well-nourished.   HENT:   Head: Normocephalic and atraumatic.   Right Ear: External ear normal.   Left Ear: External ear normal.   Nose: Nose normal.   Mouth/Throat: Oropharynx is clear and moist.   Eyes: Conjunctivae and EOM are normal. Pupils are equal, round, and reactive to light.   Neck: Normal range of motion. Neck supple.   Cardiovascular: Normal rate, regular rhythm, normal heart sounds and intact distal pulses.  Exam reveals  no gallop and no friction rub.    No murmur heard.  Pulmonary/Chest: Effort normal and breath sounds normal. He has no wheezes. He has no rales.   Abdominal: Soft. Bowel sounds are normal. He exhibits no mass. There is no tenderness. There is no rebound and no guarding.   Musculoskeletal:        Right hip: He exhibits decreased range of motion and tenderness.   Neurological: He is alert and oriented to person, place, and time. He has normal reflexes. No cranial nerve deficit. He exhibits normal muscle tone.   Skin: Skin is warm and dry. No rash noted.   Psychiatric: He has a normal mood and affect. His behavior is normal. Judgment and thought content normal.   Nursing note and vitals reviewed.  Navjot reviewed    Assessment/Plan   Suhail was seen today for anxiety.    Diagnoses and all orders for this visit:    Anxiety    Other orders  -     clonazePAM (KlonoPIN) 2 MG tablet; Take 1 tablet by mouth 3 (Three) Times a Day.

## 2017-11-27 ENCOUNTER — OFFICE VISIT (OUTPATIENT)
Dept: FAMILY MEDICINE CLINIC | Facility: CLINIC | Age: 42
End: 2017-11-27

## 2017-11-27 VITALS
RESPIRATION RATE: 16 BRPM | DIASTOLIC BLOOD PRESSURE: 82 MMHG | HEART RATE: 73 BPM | HEIGHT: 76 IN | TEMPERATURE: 98.6 F | SYSTOLIC BLOOD PRESSURE: 126 MMHG | BODY MASS INDEX: 38.36 KG/M2 | WEIGHT: 315 LBS | OXYGEN SATURATION: 98 %

## 2017-11-27 DIAGNOSIS — L60.0 INGROWN LEFT BIG TOENAIL: Primary | ICD-10-CM

## 2017-11-27 PROCEDURE — 99213 OFFICE O/P EST LOW 20 MIN: CPT | Performed by: FAMILY MEDICINE

## 2017-11-27 RX ORDER — CEPHALEXIN 500 MG/1
500 CAPSULE ORAL 3 TIMES DAILY
Qty: 21 CAPSULE | Refills: 0 | Status: SHIPPED | OUTPATIENT
Start: 2017-11-27 | End: 2017-12-26

## 2017-11-27 NOTE — PROGRESS NOTES
Subjective   Suhail Gamino is a 42 y.o. male.   Chief Complaint   Patient presents with   • Nail Problem     left big toe.       Toe Pain    The incident occurred 3 to 5 days ago. There was no injury mechanism. The pain is present in the left toes. The quality of the pain is described as aching. The pain is at a severity of 7/10. The pain is moderate. The pain has been constant since onset. Associated symptoms include an inability to bear weight. The symptoms are aggravated by movement, weight bearing and palpation. Treatments tried: soaking and cleaning with betadine. The treatment provided mild relief.        The following portions of the patient's history were reviewed and updated as appropriate: allergies, current medications, past family history, past medical history, past social history, past surgical history and problem list.    Review of Systems   Constitutional: Negative.    HENT: Negative.    Eyes: Negative.    Respiratory: Negative.    Cardiovascular: Negative.  Negative for chest pain.   Gastrointestinal: Negative.  Negative for nausea.   Endocrine: Negative.    Genitourinary: Negative.    Musculoskeletal: Positive for arthralgias.   Skin: Negative.    Allergic/Immunologic: Negative.    Neurological: Negative.    Hematological: Negative.    Psychiatric/Behavioral: Negative for decreased concentration. The patient is nervous/anxious.    All other systems reviewed and are negative.      Objective   Physical Exam   Constitutional: He is oriented to person, place, and time. He appears well-developed and well-nourished.   HENT:   Head: Normocephalic and atraumatic.   Right Ear: External ear normal.   Left Ear: External ear normal.   Nose: Nose normal.   Mouth/Throat: Oropharynx is clear and moist.   Eyes: Conjunctivae and EOM are normal. Pupils are equal, round, and reactive to light.   Neck: Normal range of motion. Neck supple.   Cardiovascular: Normal rate, regular rhythm, normal heart sounds  and intact distal pulses.  Exam reveals no gallop and no friction rub.    No murmur heard.  Pulmonary/Chest: Effort normal and breath sounds normal. He has no wheezes. He has no rales.   Abdominal: Soft. Bowel sounds are normal. He exhibits no mass. There is no tenderness. There is no rebound and no guarding.   Musculoskeletal:        Right hip: He exhibits decreased range of motion and tenderness.        Left foot: There is tenderness and swelling.        Neurological: He is alert and oriented to person, place, and time. He has normal reflexes. No cranial nerve deficit. He exhibits normal muscle tone.   Skin: Skin is warm and dry. No rash noted.   Psychiatric: He has a normal mood and affect. His behavior is normal. Judgment and thought content normal.   Nursing note and vitals reviewed.      Assessment/Plan   Suhail was seen today for nail problem.    Diagnoses and all orders for this visit:    Ingrown left big toenail  Comments:  infected  Orders:  -     Ambulatory Referral to Podiatry    Other orders  -     cephalexin (KEFLEX) 500 MG capsule; Take 1 capsule by mouth 3 (Three) Times a Day.

## 2017-12-26 ENCOUNTER — OFFICE VISIT (OUTPATIENT)
Dept: PODIATRY | Facility: CLINIC | Age: 42
End: 2017-12-26

## 2017-12-26 VITALS
HEART RATE: 88 BPM | OXYGEN SATURATION: 97 % | SYSTOLIC BLOOD PRESSURE: 131 MMHG | HEIGHT: 76 IN | BODY MASS INDEX: 38.36 KG/M2 | WEIGHT: 315 LBS | DIASTOLIC BLOOD PRESSURE: 83 MMHG

## 2017-12-26 DIAGNOSIS — L60.0 INGROWN TOENAIL: ICD-10-CM

## 2017-12-26 DIAGNOSIS — M79.675 GREAT TOE PAIN, LEFT: Primary | ICD-10-CM

## 2017-12-26 PROCEDURE — 99203 OFFICE O/P NEW LOW 30 MIN: CPT | Performed by: PODIATRIST

## 2017-12-26 PROCEDURE — 11750 EXCISION NAIL&NAIL MATRIX: CPT | Performed by: PODIATRIST

## 2017-12-26 NOTE — PROGRESS NOTES
Suhail Gamino  1975  42 y.o. male   PCP- Dr. Carrera  Patient presents today for a possible ingrown toenail on his left great toe.    12/26/2017  Chief Complaint   Patient presents with   • Left Foot - Ingrown Toenail           History of Present Illness    Suhail Gamino is a 42 y.o.male who presents to clinic with chief complaint of left great toe pain.  Pain is located to the inside portion of the toenail.  He states that the nail is growing into the skin.  The issue has been present for 2 months.  It is painful.  He describes as sharp and rates it as a 3 out of 10.  He has used antibiotics and has been soaking it which has helped some.  He denies any injuries to the toe.  He has no other pedal complaints.          Past Medical History:   Diagnosis Date   • Achilles tendinitis, left leg    • Acute bronchitis    • Fever    • Generalized anxiety disorder    • Health examination of defined subpopulation    • Hip pain    • Lumbar back pain     Pain radiating to lumbar region of back   • Obesity    • On long term drug therapy    • Osteoarthritis of hip    • Other long term (current) drug therapy          Past Surgical History:   Procedure Laterality Date   • HIP SURGERY Right    • VASECTOMY     • WISDOM TOOTH EXTRACTION           Family History   Problem Relation Age of Onset   • Hypertension Father    • Cancer Paternal Grandfather      Colorectal Cancer   • No Known Problems Mother    • No Known Problems Sister    • No Known Problems Sister    • No Known Problems Sister    • No Known Problems Son    • No Known Problems Son    • No Known Problems Daughter    • No Known Problems Daughter        No Known Allergies    Social History     Social History   • Marital status:      Spouse name: Tomas   • Number of children: 2   • Years of education: N/A     Occupational History   • Not on file.     Social History Main Topics   • Smoking status: Former Smoker     Types: Cigarettes     Quit  "date: 1999   • Smokeless tobacco: Current User     Types: Snuff   • Alcohol use No   • Drug use: No   • Sexual activity: Yes     Partners: Female     Other Topics Concern   • Not on file     Social History Narrative         Current Outpatient Prescriptions   Medication Sig Dispense Refill   • clonazePAM (KlonoPIN) 2 MG tablet Take 1 tablet by mouth 3 (Three) Times a Day. 90 tablet 2   • fexofenadine (ALLEGRA) 180 MG tablet TAKE 1 TABLET BY MOUTH DAILY 30 tablet 5   • meloxicam (MOBIC) 15 MG tablet TAKE 1 TABLET BY MOUTH DAILY 30 tablet 5     No current facility-administered medications for this visit.          OBJECTIVE    /83  Pulse 88  Ht 191.8 cm (75.51\")  Wt (!) 147 kg (323 lb 6.6 oz)  SpO2 97%  BMI 39.88 kg/m2      Review of Systems   Constitutional: Negative for chills and fever.   HENT: Negative for facial swelling and hearing loss.    Respiratory: Negative for cough and shortness of breath.    Cardiovascular: Negative for chest pain and leg swelling.   Gastrointestinal: Negative for constipation, diarrhea and nausea.   Musculoskeletal:        Left great toe pain     Neurological: Negative.    Psychiatric/Behavioral: Negative.          Constitutional: well developed, well nourished    HEENT: Normocephalic and atraumatic, normal hearing    Respiratory: Non labored respirations noted    Cardiovascular:    DP/PT pulses palpable    CFT brisk  to all digits   No erythema     Musculoskeletal:  Muscle strength is 5/5 for all muscle groups tested   ROM of the 1st MTP is full without pain or crepitus  ROM of the ankle joint is full without pain or crepitus      Dermatological:   Left hallux nail is incurvated and ingrowing on the lateral border.  There is edema noted.  It is tender to palpation.    Skin is warm, dry and intact    Webspaces 1-4 bilateral are clean, dry and intact.   No subcutaneous nodules or masses noted    No open wounds noted     Neurological:     Sensation intact to light touch    DTR " intact    Psychiatric: A&O x 3 with normal mood and affect. NAD.         Nail Removal  Date/Time: 12/26/2017 12:42 PM  Performed by: AI TREJO  Authorized by: AI TREJO   Consent: Verbal consent obtained. Written consent obtained.  Risks and benefits: risks, benefits and alternatives were discussed  Consent given by: patient  Patient understanding: patient states understanding of the procedure being performed  Patient identity confirmed: verbally with patient  Location: left foot  Location details: left big toe  Anesthesia: digital block    Anesthesia:  Local Anesthetic: lidocaine 2% without epinephrine  Preparation: skin prepped with Betadine  Amount removed: partial (Nail plate was  from the underlying nailbed and removed nail nippers and a hemostat)  Nail matrix removed: partial (Phenol)  Dressing: antibiotic ointment and dressing applied  Patient tolerance: Patient tolerated the procedure well with no immediate complications              ASSESSMENT AND PLAN    Suhail was seen today for ingrown toenail.    Diagnoses and all orders for this visit:    Great toe pain, left    Ingrown toenail    - Comprehensive foot and ankle exam performed  - Diagnosis, prevention and treatment of ingrown toenails discussed with patient, including risks and potential benefits of nail avulsion both temporary and permanent versus simple debridement.  - Patient elected for a partial permanent nail avulsion  - Dispensed aftercare instruction sheet  - All questions were answered and the patient is in agreement with the current treatment plan.  - RTC in 2 weeks          This document has been electronically signed by Ai Trejo DPM on December 26, 2017 12:38 PM     12/26/2017  12:38 PM

## 2018-01-30 ENCOUNTER — LAB (OUTPATIENT)
Dept: LAB | Facility: OTHER | Age: 43
End: 2018-01-30

## 2018-01-30 ENCOUNTER — OFFICE VISIT (OUTPATIENT)
Dept: FAMILY MEDICINE CLINIC | Facility: CLINIC | Age: 43
End: 2018-01-30

## 2018-01-30 VITALS
DIASTOLIC BLOOD PRESSURE: 90 MMHG | BODY MASS INDEX: 39.17 KG/M2 | OXYGEN SATURATION: 97 % | HEIGHT: 75 IN | SYSTOLIC BLOOD PRESSURE: 142 MMHG | TEMPERATURE: 98.4 F | HEART RATE: 83 BPM | RESPIRATION RATE: 16 BRPM | WEIGHT: 315 LBS

## 2018-01-30 DIAGNOSIS — F41.9 ANXIETY: ICD-10-CM

## 2018-01-30 DIAGNOSIS — F41.9 ANXIETY: Primary | ICD-10-CM

## 2018-01-30 LAB
AMPHET+METHAMPHET UR QL: NEGATIVE
BARBITURATES UR QL SCN: NEGATIVE
BENZODIAZ UR QL SCN: NEGATIVE
CANNABINOIDS SERPL QL: NEGATIVE
COCAINE UR QL: NEGATIVE
METHADONE UR QL SCN: NEGATIVE
OPIATES UR QL: NEGATIVE
OXYCODONE UR QL SCN: NEGATIVE

## 2018-01-30 PROCEDURE — 80307 DRUG TEST PRSMV CHEM ANLYZR: CPT | Performed by: FAMILY MEDICINE

## 2018-01-30 PROCEDURE — 99213 OFFICE O/P EST LOW 20 MIN: CPT | Performed by: FAMILY MEDICINE

## 2018-01-30 RX ORDER — CLONAZEPAM 2 MG/1
2 TABLET ORAL 3 TIMES DAILY
Qty: 90 TABLET | Refills: 2 | Status: SHIPPED | OUTPATIENT
Start: 2018-01-30 | End: 2018-04-25 | Stop reason: SDUPTHER

## 2018-01-30 NOTE — PROGRESS NOTES
Subjective   Suhail Gamino is a 42 y.o. male.   Chief Complaint   Patient presents with   • Anxiety     3 mo control refill requesting refill on Clonazepam       Anxiety   Presents for follow-up visit. Symptoms include chest pain, excessive worry and nervous/anxious behavior. Patient reports no decreased concentration, depressed mood or nausea. Symptoms occur most days. The severity of symptoms is moderate. The quality of sleep is fair.     Compliance with medications is %. Treatment side effects: none.        The following portions of the patient's history were reviewed and updated as appropriate: allergies, current medications, past family history, past medical history, past social history, past surgical history and problem list.    Review of Systems   Constitutional: Negative.    HENT: Negative.    Eyes: Negative.    Respiratory: Negative.    Cardiovascular: Positive for chest pain.   Gastrointestinal: Negative.  Negative for nausea.   Endocrine: Negative.    Genitourinary: Negative.    Musculoskeletal: Positive for arthralgias.   Skin: Negative.    Allergic/Immunologic: Negative.    Neurological: Negative.    Hematological: Negative.    Psychiatric/Behavioral: Negative for decreased concentration. The patient is nervous/anxious.    All other systems reviewed and are negative.      Objective   Physical Exam   Constitutional: He is oriented to person, place, and time. He appears well-developed and well-nourished.   HENT:   Head: Normocephalic and atraumatic.   Right Ear: External ear normal.   Left Ear: External ear normal.   Nose: Nose normal.   Mouth/Throat: Oropharynx is clear and moist.   Eyes: Conjunctivae and EOM are normal. Pupils are equal, round, and reactive to light.   Neck: Normal range of motion. Neck supple.   Cardiovascular: Normal rate, regular rhythm, normal heart sounds and intact distal pulses.  Exam reveals no gallop and no friction rub.    No murmur heard.  Pulmonary/Chest:  Effort normal and breath sounds normal. He has no wheezes. He has no rales.   Abdominal: Soft. Bowel sounds are normal. He exhibits no mass. There is no tenderness. There is no rebound and no guarding.   Musculoskeletal:        Right hip: He exhibits decreased range of motion and tenderness.   Neurological: He is alert and oriented to person, place, and time. He has normal reflexes. No cranial nerve deficit. He exhibits normal muscle tone.   Skin: Skin is warm and dry. No rash noted.   Psychiatric: He has a normal mood and affect. His behavior is normal. Judgment and thought content normal.   Nursing note and vitals reviewed.  Navjot reviewed    Assessment/Plan   Suhail was seen today for anxiety.    Diagnoses and all orders for this visit:    Anxiety  -     clonazePAM (KlonoPIN) 2 MG tablet; Take 1 tablet by mouth 3 (Three) Times a Day.  -     Urine Drug Screen - Urine, Clean Catch; Future

## 2018-03-05 RX ORDER — MELOXICAM 15 MG/1
TABLET ORAL
Qty: 30 TABLET | Refills: 5 | Status: SHIPPED | OUTPATIENT
Start: 2018-03-05 | End: 2018-09-04 | Stop reason: SDUPTHER

## 2018-04-04 DIAGNOSIS — F41.9 ANXIETY: ICD-10-CM

## 2018-04-04 RX ORDER — FEXOFENADINE HCL 180 MG/1
TABLET ORAL
Qty: 30 TABLET | Refills: 5 | Status: SHIPPED | OUTPATIENT
Start: 2018-04-04 | End: 2018-10-03 | Stop reason: SDUPTHER

## 2018-04-04 RX ORDER — CLONAZEPAM 2 MG/1
TABLET ORAL
Qty: 90 TABLET | Refills: 2 | OUTPATIENT
Start: 2018-04-04

## 2018-04-25 ENCOUNTER — OFFICE VISIT (OUTPATIENT)
Dept: FAMILY MEDICINE CLINIC | Facility: CLINIC | Age: 43
End: 2018-04-25

## 2018-04-25 VITALS
RESPIRATION RATE: 14 BRPM | WEIGHT: 315 LBS | OXYGEN SATURATION: 97 % | HEIGHT: 75 IN | TEMPERATURE: 97.9 F | BODY MASS INDEX: 39.17 KG/M2 | HEART RATE: 80 BPM | DIASTOLIC BLOOD PRESSURE: 82 MMHG | SYSTOLIC BLOOD PRESSURE: 142 MMHG

## 2018-04-25 DIAGNOSIS — R03.0 ELEVATED BLOOD-PRESSURE READING WITHOUT DIAGNOSIS OF HYPERTENSION: Primary | ICD-10-CM

## 2018-04-25 DIAGNOSIS — R53.83 FATIGUE, UNSPECIFIED TYPE: ICD-10-CM

## 2018-04-25 DIAGNOSIS — F41.9 ANXIETY: ICD-10-CM

## 2018-04-25 DIAGNOSIS — E66.09 CLASS 2 OBESITY DUE TO EXCESS CALORIES WITHOUT SERIOUS COMORBIDITY WITH BODY MASS INDEX (BMI) OF 39.0 TO 39.9 IN ADULT: ICD-10-CM

## 2018-04-25 PROCEDURE — 99214 OFFICE O/P EST MOD 30 MIN: CPT | Performed by: FAMILY MEDICINE

## 2018-04-25 RX ORDER — CLONAZEPAM 2 MG/1
2 TABLET ORAL 3 TIMES DAILY
Qty: 90 TABLET | Refills: 2 | Status: SHIPPED | OUTPATIENT
Start: 2018-04-25 | End: 2018-07-24 | Stop reason: SDUPTHER

## 2018-04-25 NOTE — PROGRESS NOTES
Subjective   Suhail Gamino is a 42 y.o. male.   Chief Complaint   Patient presents with   • Anxiety     control refill on Clonazepam       Anxiety   Presents for follow-up visit. Symptoms include chest pain, nervous/anxious behavior and palpitations. Patient reports no decreased concentration or nausea. Symptoms occur rarely. The severity of symptoms is moderate. The quality of sleep is good.     Compliance with medications is %. Treatment side effects: none.        The following portions of the patient's history were reviewed and updated as appropriate: allergies, current medications, past family history, past medical history, past social history, past surgical history and problem list.    Review of Systems   Constitutional: Positive for fatigue.   HENT: Negative.    Eyes: Negative.    Respiratory: Negative.    Cardiovascular: Positive for chest pain and palpitations.   Gastrointestinal: Negative.  Negative for nausea.   Endocrine: Negative.    Genitourinary: Negative.    Musculoskeletal: Positive for arthralgias.   Skin: Negative.    Allergic/Immunologic: Negative.    Neurological: Negative.    Hematological: Negative.    Psychiatric/Behavioral: Negative for decreased concentration. The patient is nervous/anxious.    All other systems reviewed and are negative.      Objective   Physical Exam   Constitutional: He is oriented to person, place, and time. He appears well-developed and well-nourished.   HENT:   Head: Normocephalic and atraumatic.   Right Ear: External ear normal.   Left Ear: External ear normal.   Nose: Nose normal.   Mouth/Throat: Oropharynx is clear and moist.   Eyes: Conjunctivae and EOM are normal. Pupils are equal, round, and reactive to light.   Neck: Normal range of motion. Neck supple.   Cardiovascular: Normal rate, regular rhythm, normal heart sounds and intact distal pulses.  Exam reveals no gallop and no friction rub.    No murmur heard.  Pulmonary/Chest: Effort normal and  breath sounds normal. He has no wheezes. He has no rales.   Abdominal: Soft. Bowel sounds are normal. He exhibits no mass. There is no tenderness. There is no rebound and no guarding.   Musculoskeletal:        Right hip: He exhibits decreased range of motion and tenderness.   Neurological: He is alert and oriented to person, place, and time. He has normal reflexes. No cranial nerve deficit. He exhibits normal muscle tone.   Skin: Skin is warm and dry. No rash noted.   Psychiatric: He has a normal mood and affect. His behavior is normal. Judgment and thought content normal.   Nursing note and vitals reviewed.  Navjot reviewed    Assessment/Plan   Suhail was seen today for anxiety.    Diagnoses and all orders for this visit:    Elevated blood-pressure reading without diagnosis of hypertension  -     CBC & Differential; Future  -     Comprehensive Metabolic Panel; Future    Anxiety  -     clonazePAM (KlonoPIN) 2 MG tablet; Take 1 tablet by mouth 3 (Three) Times a Day.    Class 2 obesity due to excess calories without serious comorbidity with body mass index (BMI) of 39.0 to 39.9 in adult  -     Lipid Panel; Future    Fatigue, unspecified type  -     TSH; Future  -     Vitamin B12; Future  -     Testosterone, Free, Total; Future

## 2018-05-01 ENCOUNTER — LAB (OUTPATIENT)
Dept: LAB | Facility: OTHER | Age: 43
End: 2018-05-01

## 2018-05-01 DIAGNOSIS — R53.83 FATIGUE, UNSPECIFIED TYPE: ICD-10-CM

## 2018-05-01 DIAGNOSIS — E66.09 CLASS 2 OBESITY DUE TO EXCESS CALORIES WITHOUT SERIOUS COMORBIDITY WITH BODY MASS INDEX (BMI) OF 39.0 TO 39.9 IN ADULT: ICD-10-CM

## 2018-05-01 DIAGNOSIS — R03.0 ELEVATED BLOOD-PRESSURE READING WITHOUT DIAGNOSIS OF HYPERTENSION: ICD-10-CM

## 2018-05-01 LAB
ALBUMIN SERPL-MCNC: 4 G/DL (ref 3.2–5.5)
ALBUMIN/GLOB SERPL: 1.2 G/DL (ref 1–3)
ALP SERPL-CCNC: 68 U/L (ref 15–121)
ALT SERPL W P-5'-P-CCNC: 18 U/L (ref 10–60)
ANION GAP SERPL CALCULATED.3IONS-SCNC: 8 MMOL/L (ref 5–15)
AST SERPL-CCNC: 14 U/L (ref 10–60)
BASOPHILS # BLD AUTO: 0.05 10*3/MM3 (ref 0–0.2)
BASOPHILS NFR BLD AUTO: 0.6 % (ref 0–2)
BILIRUB SERPL-MCNC: 0.6 MG/DL (ref 0.2–1)
BUN BLD-MCNC: 10 MG/DL (ref 8–25)
BUN/CREAT SERPL: 11.1 (ref 7–25)
CALCIUM SPEC-SCNC: 9.1 MG/DL (ref 8.4–10.8)
CHLORIDE SERPL-SCNC: 105 MMOL/L (ref 100–112)
CHOLEST SERPL-MCNC: 166 MG/DL (ref 150–200)
CO2 SERPL-SCNC: 29 MMOL/L (ref 20–32)
CREAT BLD-MCNC: 0.9 MG/DL (ref 0.4–1.3)
DEPRECATED RDW RBC AUTO: 43.3 FL (ref 35.1–43.9)
EOSINOPHIL # BLD AUTO: 0.15 10*3/MM3 (ref 0–0.7)
EOSINOPHIL NFR BLD AUTO: 1.9 % (ref 0–7)
ERYTHROCYTE [DISTWIDTH] IN BLOOD BY AUTOMATED COUNT: 13.8 % (ref 11.5–14.5)
GFR SERPL CREATININE-BSD FRML MDRD: 93 ML/MIN/1.73 (ref 63–147)
GLOBULIN UR ELPH-MCNC: 3.4 GM/DL (ref 2.5–4.6)
GLUCOSE BLD-MCNC: 88 MG/DL (ref 70–100)
HCT VFR BLD AUTO: 50.4 % (ref 39–49)
HDLC SERPL-MCNC: 51 MG/DL (ref 35–100)
HGB BLD-MCNC: 16.5 G/DL (ref 13.7–17.3)
LDLC SERPL CALC-MCNC: 102 MG/DL
LDLC/HDLC SERPL: 2 {RATIO}
LYMPHOCYTES # BLD AUTO: 1.93 10*3/MM3 (ref 0.6–4.2)
LYMPHOCYTES NFR BLD AUTO: 24.7 % (ref 10–50)
MCH RBC QN AUTO: 28.4 PG (ref 26.5–34)
MCHC RBC AUTO-ENTMCNC: 32.7 G/DL (ref 31.5–36.3)
MCV RBC AUTO: 86.9 FL (ref 80–98)
MONOCYTES # BLD AUTO: 0.61 10*3/MM3 (ref 0–0.9)
MONOCYTES NFR BLD AUTO: 7.8 % (ref 0–12)
NEUTROPHILS # BLD AUTO: 5.07 10*3/MM3 (ref 2–8.6)
NEUTROPHILS NFR BLD AUTO: 65 % (ref 37–80)
PLATELET # BLD AUTO: 241 10*3/MM3 (ref 150–450)
PMV BLD AUTO: 10.3 FL (ref 8–12)
POTASSIUM BLD-SCNC: 4.4 MMOL/L (ref 3.4–5.4)
PROT SERPL-MCNC: 7.4 G/DL (ref 6.7–8.2)
RBC # BLD AUTO: 5.8 10*6/MM3 (ref 4.37–5.74)
SODIUM BLD-SCNC: 142 MMOL/L (ref 134–146)
TRIGL SERPL-MCNC: 66 MG/DL (ref 35–160)
TSH SERPL DL<=0.05 MIU/L-ACNC: 2.18 MIU/ML (ref 0.46–4.68)
VIT B12 BLD-MCNC: 399 PG/ML (ref 239–931)
VLDLC SERPL-MCNC: 13.2 MG/DL
WBC NRBC COR # BLD: 7.81 10*3/MM3 (ref 3.2–9.8)

## 2018-05-01 PROCEDURE — 82607 VITAMIN B-12: CPT | Performed by: FAMILY MEDICINE

## 2018-05-01 PROCEDURE — 80050 GENERAL HEALTH PANEL: CPT | Performed by: FAMILY MEDICINE

## 2018-05-01 PROCEDURE — 84403 ASSAY OF TOTAL TESTOSTERONE: CPT | Performed by: FAMILY MEDICINE

## 2018-05-01 PROCEDURE — 80061 LIPID PANEL: CPT | Performed by: FAMILY MEDICINE

## 2018-05-01 PROCEDURE — 84402 ASSAY OF FREE TESTOSTERONE: CPT | Performed by: FAMILY MEDICINE

## 2018-05-03 LAB
TESTOST FREE SERPL-MCNC: 9.7 PG/ML (ref 6.8–21.5)
TESTOST SERPL-MCNC: 620 NG/DL (ref 264–916)

## 2018-05-04 ENCOUNTER — TELEPHONE (OUTPATIENT)
Dept: FAMILY MEDICINE CLINIC | Facility: CLINIC | Age: 43
End: 2018-05-04

## 2018-07-24 ENCOUNTER — OFFICE VISIT (OUTPATIENT)
Dept: FAMILY MEDICINE CLINIC | Facility: CLINIC | Age: 43
End: 2018-07-24

## 2018-07-24 VITALS
TEMPERATURE: 97.9 F | BODY MASS INDEX: 39.17 KG/M2 | HEIGHT: 75 IN | WEIGHT: 315 LBS | DIASTOLIC BLOOD PRESSURE: 60 MMHG | HEART RATE: 79 BPM | SYSTOLIC BLOOD PRESSURE: 110 MMHG | OXYGEN SATURATION: 98 %

## 2018-07-24 DIAGNOSIS — F41.9 ANXIETY: Primary | ICD-10-CM

## 2018-07-24 PROCEDURE — 99213 OFFICE O/P EST LOW 20 MIN: CPT | Performed by: FAMILY MEDICINE

## 2018-07-24 RX ORDER — CLONAZEPAM 2 MG/1
2 TABLET ORAL 3 TIMES DAILY
Qty: 90 TABLET | Refills: 2 | Status: SHIPPED | OUTPATIENT
Start: 2018-07-24 | End: 2018-10-30 | Stop reason: SDUPTHER

## 2018-07-24 NOTE — PROGRESS NOTES
Subjective   Suhail Gamino is a 43 y.o. male.   Chief Complaint   Patient presents with   • Anxiety     controlled refill        Anxiety   Presents for follow-up visit. Symptoms include nervous/anxious behavior and palpitations. Patient reports no chest pain, decreased concentration, depressed mood, excessive worry, nausea or panic. Symptoms occur most days. The severity of symptoms is mild. The quality of sleep is good.     Compliance with medications is %. Treatment side effects: none.        The following portions of the patient's history were reviewed and updated as appropriate: allergies, current medications, past family history, past medical history, past social history, past surgical history and problem list.    Review of Systems   Constitutional: Positive for fatigue.   HENT: Negative.    Eyes: Negative.    Respiratory: Negative.    Cardiovascular: Positive for palpitations. Negative for chest pain.   Gastrointestinal: Negative.  Negative for nausea.   Endocrine: Negative.    Genitourinary: Negative.    Musculoskeletal: Positive for arthralgias.   Skin: Negative.    Allergic/Immunologic: Negative.    Neurological: Negative.    Hematological: Negative.    Psychiatric/Behavioral: Negative for decreased concentration. The patient is nervous/anxious.    All other systems reviewed and are negative.      Objective   Physical Exam   Constitutional: He is oriented to person, place, and time. He appears well-developed and well-nourished.   HENT:   Head: Normocephalic and atraumatic.   Right Ear: External ear normal.   Left Ear: External ear normal.   Nose: Nose normal.   Mouth/Throat: Oropharynx is clear and moist.   Eyes: Pupils are equal, round, and reactive to light. Conjunctivae and EOM are normal.   Neck: Normal range of motion. Neck supple.   Cardiovascular: Normal rate, regular rhythm, normal heart sounds and intact distal pulses.  Exam reveals no gallop and no friction rub.    No murmur  heard.  Pulmonary/Chest: Effort normal and breath sounds normal. He has no wheezes. He has no rales.   Abdominal: Soft. Bowel sounds are normal. He exhibits no mass. There is no tenderness. There is no rebound and no guarding.   Musculoskeletal:        Right hip: He exhibits decreased range of motion and tenderness.   Neurological: He is alert and oriented to person, place, and time. He has normal reflexes. No cranial nerve deficit. He exhibits normal muscle tone.   Skin: Skin is warm and dry. No rash noted.   Psychiatric: He has a normal mood and affect. His behavior is normal. Judgment and thought content normal.   Nursing note and vitals reviewed.  Navjot reviewed    Assessment/Plan   Suhail was seen today for anxiety.    Diagnoses and all orders for this visit:    Anxiety  -     clonazePAM (KlonoPIN) 2 MG tablet; Take 1 tablet by mouth 3 (Three) Times a Day.

## 2018-08-04 ENCOUNTER — HOSPITAL ENCOUNTER (EMERGENCY)
Facility: HOSPITAL | Age: 43
Discharge: HOME | End: 2018-08-04
Attending: EMERGENCY MEDICINE | Admitting: EMERGENCY MEDICINE

## 2018-08-04 VITALS
WEIGHT: 315 LBS | BODY MASS INDEX: 38.36 KG/M2 | RESPIRATION RATE: 18 BRPM | HEIGHT: 76 IN | HEART RATE: 76 BPM | SYSTOLIC BLOOD PRESSURE: 134 MMHG | OXYGEN SATURATION: 97 % | TEMPERATURE: 97.6 F | DIASTOLIC BLOOD PRESSURE: 71 MMHG

## 2018-08-04 DIAGNOSIS — G89.29 CHRONIC MIDLINE LOW BACK PAIN WITH LEFT-SIDED SCIATICA: Primary | ICD-10-CM

## 2018-08-04 DIAGNOSIS — M54.42 CHRONIC MIDLINE LOW BACK PAIN WITH LEFT-SIDED SCIATICA: Primary | ICD-10-CM

## 2018-08-04 PROCEDURE — 96372 THER/PROPH/DIAG INJ SC/IM: CPT

## 2018-08-04 PROCEDURE — 25010000002 ORPHENADRINE CITRATE PER 60 MG: Performed by: PHYSICIAN ASSISTANT

## 2018-08-04 PROCEDURE — 99283 EMERGENCY DEPT VISIT LOW MDM: CPT

## 2018-08-04 RX ORDER — HYDROCODONE BITARTRATE AND ACETAMINOPHEN 10; 325 MG/1; MG/1
1 TABLET ORAL ONCE
Status: COMPLETED | OUTPATIENT
Start: 2018-08-04 | End: 2018-08-04

## 2018-08-04 RX ORDER — CYCLOBENZAPRINE HCL 10 MG
10 TABLET ORAL 3 TIMES DAILY PRN
Qty: 30 TABLET | Refills: 0 | Status: SHIPPED | OUTPATIENT
Start: 2018-08-04 | End: 2018-08-14

## 2018-08-04 RX ORDER — METHYLPREDNISOLONE 4 MG/1
TABLET ORAL
Qty: 21 TABLET | Refills: 0 | Status: SHIPPED | OUTPATIENT
Start: 2018-08-04 | End: 2018-10-09

## 2018-08-04 RX ORDER — ORPHENADRINE CITRATE 30 MG/ML
60 INJECTION INTRAMUSCULAR; INTRAVENOUS ONCE
Status: COMPLETED | OUTPATIENT
Start: 2018-08-04 | End: 2018-08-04

## 2018-08-04 RX ADMIN — ORPHENADRINE CITRATE 60 MG: 30 INJECTION INTRAMUSCULAR; INTRAVENOUS at 19:53

## 2018-08-04 RX ADMIN — HYDROCODONE BITARTRATE AND ACETAMINOPHEN 1 TABLET: 10; 325 TABLET ORAL at 19:52
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
## 2018-08-06 ENCOUNTER — TELEPHONE (OUTPATIENT)
Dept: FAMILY MEDICINE CLINIC | Facility: CLINIC | Age: 43
End: 2018-08-06

## 2018-08-06 NOTE — TELEPHONE ENCOUNTER
----- Message from Nivia Pineda MA sent at 8/6/2018  7:41 AM CDT -----  Regarding: FW: ER visit on 8/4/18  Contact: [phone]      ----- Message -----  From: Salome Smith RegSched Rep  Sent: 8/6/2018   7:06 AM  To: Nivia Pineda MA  Subject: FW: ER visit on 8/4/18                               ----- Message -----  From: Moira Snider  Sent: 8/4/2018   9:04 PM  To: Ailyn Delgado [facility]  Subject: ER visit on 8/4/18                               Instructions       Your medications have changed today     See your medication changes list for details.     Read the attached information     Chronic Back Pain (English)   Sciatica  Easy-to-Read (English)      these medications from any pharmacy with your printed prescription     cyclobenzaprine • MethylPREDNISolone      Schedule an appointment with Andres Carrera MD as soon as possible for a visit in 3 days (around 8/7/2018)     Specialty: Family Medicine, Emergency Medicine   Contact:   [address] [place] [id number] [phone]

## 2018-09-05 RX ORDER — MELOXICAM 15 MG/1
TABLET ORAL
Qty: 30 TABLET | Refills: 5 | Status: SHIPPED | OUTPATIENT
Start: 2018-09-05 | End: 2018-10-30 | Stop reason: SDUPTHER

## 2018-10-03 RX ORDER — FEXOFENADINE HCL 180 MG/1
TABLET ORAL
Qty: 30 TABLET | Refills: 5 | Status: SHIPPED | OUTPATIENT
Start: 2018-10-03 | End: 2018-10-30 | Stop reason: SDUPTHER

## 2018-10-09 ENCOUNTER — OFFICE VISIT (OUTPATIENT)
Dept: FAMILY MEDICINE CLINIC | Facility: CLINIC | Age: 43
End: 2018-10-09

## 2018-10-09 VITALS
DIASTOLIC BLOOD PRESSURE: 78 MMHG | WEIGHT: 315 LBS | HEART RATE: 70 BPM | BODY MASS INDEX: 38.36 KG/M2 | OXYGEN SATURATION: 98 % | HEIGHT: 76 IN | SYSTOLIC BLOOD PRESSURE: 130 MMHG | TEMPERATURE: 97.3 F

## 2018-10-09 DIAGNOSIS — M54.40 ACUTE LOW BACK PAIN WITH SCIATICA, SCIATICA LATERALITY UNSPECIFIED, UNSPECIFIED BACK PAIN LATERALITY: Primary | ICD-10-CM

## 2018-10-09 PROCEDURE — 99214 OFFICE O/P EST MOD 30 MIN: CPT | Performed by: FAMILY MEDICINE

## 2018-10-09 PROCEDURE — 96372 THER/PROPH/DIAG INJ SC/IM: CPT | Performed by: FAMILY MEDICINE

## 2018-10-09 RX ORDER — CYCLOBENZAPRINE HCL 10 MG
10 TABLET ORAL 3 TIMES DAILY PRN
Qty: 60 TABLET | Refills: 1 | Status: SHIPPED | OUTPATIENT
Start: 2018-10-09 | End: 2018-10-30 | Stop reason: SDUPTHER

## 2018-10-09 RX ORDER — METHYLPREDNISOLONE ACETATE 80 MG/ML
80 INJECTION, SUSPENSION INTRA-ARTICULAR; INTRALESIONAL; INTRAMUSCULAR; SOFT TISSUE ONCE
Status: COMPLETED | OUTPATIENT
Start: 2018-10-09 | End: 2018-10-09

## 2018-10-09 RX ORDER — KETOROLAC TROMETHAMINE 30 MG/ML
60 INJECTION, SOLUTION INTRAMUSCULAR; INTRAVENOUS ONCE
Status: COMPLETED | OUTPATIENT
Start: 2018-10-09 | End: 2018-10-09

## 2018-10-09 RX ADMIN — METHYLPREDNISOLONE ACETATE 80 MG: 80 INJECTION, SUSPENSION INTRA-ARTICULAR; INTRALESIONAL; INTRAMUSCULAR; SOFT TISSUE at 10:46

## 2018-10-09 RX ADMIN — KETOROLAC TROMETHAMINE 60 MG: 30 INJECTION, SOLUTION INTRAMUSCULAR; INTRAVENOUS at 10:45

## 2018-10-09 NOTE — PROGRESS NOTES
Subjective   Suhail Gamino is a 43 y.o. male.   Chief Complaint   Patient presents with   • Pain     Low back and Lt side groin going onto Lt Leg   x 1 Month       Back Pain   This is a new problem. The current episode started 1 to 4 weeks ago. The problem occurs daily. The problem has been waxing and waning since onset. The pain is present in the lumbar spine. The quality of the pain is described as aching. The pain radiates to the left foot. The pain is at a severity of 6/10. The pain is moderate. The symptoms are aggravated by bending, position, sitting, standing and twisting. Pertinent negatives include no bladder incontinence or bowel incontinence. He has tried NSAIDs, muscle relaxant and heat for the symptoms. The treatment provided moderate relief.        The following portions of the patient's history were reviewed and updated as appropriate: allergies, current medications, past family history, past medical history, past social history, past surgical history and problem list.    Review of Systems   HENT: Negative.    Eyes: Negative.    Respiratory: Negative.    Cardiovascular: Negative.    Gastrointestinal: Negative.  Negative for bowel incontinence.   Endocrine: Negative.    Genitourinary: Negative.  Negative for bladder incontinence.   Musculoskeletal: Positive for back pain.   Skin: Negative.    Allergic/Immunologic: Negative.    Neurological: Negative.    Hematological: Negative.    Psychiatric/Behavioral: The patient is nervous/anxious.    All other systems reviewed and are negative.      Objective   Physical Exam   Constitutional: He is oriented to person, place, and time. He appears well-developed and well-nourished.   HENT:   Head: Normocephalic and atraumatic.   Right Ear: External ear normal.   Left Ear: External ear normal.   Nose: Nose normal.   Mouth/Throat: Oropharynx is clear and moist.   Eyes: Pupils are equal, round, and reactive to light. Conjunctivae and EOM are normal.    Neck: Normal range of motion. Neck supple.   Cardiovascular: Normal rate, regular rhythm, normal heart sounds and intact distal pulses.  Exam reveals no gallop and no friction rub.    No murmur heard.  Pulmonary/Chest: Effort normal and breath sounds normal. He has no wheezes. He has no rales.   Abdominal: Soft. Bowel sounds are normal. He exhibits no mass. There is no tenderness. There is no rebound and no guarding.   obese   Musculoskeletal:        Right hip: He exhibits decreased range of motion and tenderness.        Lumbar back: He exhibits decreased range of motion and tenderness.   Neurological: He is alert and oriented to person, place, and time. He has normal reflexes. No cranial nerve deficit. He exhibits normal muscle tone.   Skin: Skin is warm and dry. No rash noted.   Psychiatric: He has a normal mood and affect. His behavior is normal. Judgment and thought content normal.   Nursing note and vitals reviewed.      Assessment/Plan   Suhail was seen today for pain.    Diagnoses and all orders for this visit:    Acute low back pain with sciatica, sciatica laterality unspecified, unspecified back pain laterality  -     methylPREDNISolone acetate (DEPO-medrol) injection 80 mg; Inject 1 mL into the appropriate muscle as directed by prescriber 1 (One) Time.  -     ketorolac (TORADOL) injection 60 mg; Inject 2 mL into the appropriate muscle as directed by prescriber 1 (One) Time.  -     XR Spine Lumbar 4+ View; Future    Other orders  -     cyclobenzaprine (FLEXERIL) 10 MG tablet; Take 1 tablet by mouth 3 (Three) Times a Day As Needed for Muscle Spasms.          Continue home stretching exercises.  Obtain emergency room records.

## 2018-10-30 ENCOUNTER — OFFICE VISIT (OUTPATIENT)
Dept: FAMILY MEDICINE CLINIC | Facility: CLINIC | Age: 43
End: 2018-10-30

## 2018-10-30 VITALS
BODY MASS INDEX: 38.36 KG/M2 | TEMPERATURE: 97.4 F | HEIGHT: 76 IN | WEIGHT: 315 LBS | SYSTOLIC BLOOD PRESSURE: 142 MMHG | DIASTOLIC BLOOD PRESSURE: 86 MMHG | HEART RATE: 74 BPM

## 2018-10-30 DIAGNOSIS — M16.51 POST-TRAUMATIC OSTEOARTHRITIS OF RIGHT HIP: Chronic | ICD-10-CM

## 2018-10-30 DIAGNOSIS — J30.1 CHRONIC SEASONAL ALLERGIC RHINITIS DUE TO POLLEN: Chronic | ICD-10-CM

## 2018-10-30 DIAGNOSIS — F41.1 GENERALIZED ANXIETY DISORDER: Primary | Chronic | ICD-10-CM

## 2018-10-30 PROCEDURE — 99214 OFFICE O/P EST MOD 30 MIN: CPT | Performed by: INTERNAL MEDICINE

## 2018-10-30 RX ORDER — FEXOFENADINE HCL 180 MG/1
180 TABLET ORAL DAILY
Qty: 30 TABLET | Refills: 5 | Status: SHIPPED | OUTPATIENT
Start: 2018-10-30 | End: 2019-04-05 | Stop reason: SDUPTHER

## 2018-10-30 RX ORDER — CLONAZEPAM 2 MG/1
2 TABLET ORAL 3 TIMES DAILY
Qty: 90 TABLET | Refills: 5 | Status: SHIPPED | OUTPATIENT
Start: 2018-10-30 | End: 2018-12-28

## 2018-10-30 RX ORDER — MELOXICAM 15 MG/1
15 TABLET ORAL DAILY
Qty: 30 TABLET | Refills: 5 | Status: SHIPPED | OUTPATIENT
Start: 2018-10-30 | End: 2019-04-05 | Stop reason: SDUPTHER

## 2018-10-30 RX ORDER — CYCLOBENZAPRINE HCL 10 MG
10 TABLET ORAL 3 TIMES DAILY PRN
Qty: 60 TABLET | Refills: 3 | Status: SHIPPED | OUTPATIENT
Start: 2018-10-30 | End: 2019-05-08 | Stop reason: SDUPTHER

## 2018-10-30 NOTE — PROGRESS NOTES
Chief Complaint   Patient presents with   • Anxiety     3 mo controlled f/u, Dr. Andres Carrera patient     Subjective   Suhail Gamino is a 43 y.o. male who presents to the office for follow-up.  He has anxiety with panic disorder, and takes Klonopin 3 times a day.  He has tried reducing the dosage in the past, but started having breakthrough panic attacks.  This dose works well for him and he denies any recent panic attacks.  He has allergic rhinitis and takes Allegra daily.  He also has posttraumatic arthritis which affects his right hip.  He takes meloxicam daily and uses Flexeril as needed for muscle spasm in his low back and hip.     The following portions of the patient's history were reviewed and updated as appropriate: allergies, current medications, past family history, past medical history, past social history, past surgical history and problem list.    Review of Systems   Constitutional: Negative for chills, fatigue and fever.   HENT: Negative for congestion, sneezing, sore throat and trouble swallowing.    Eyes: Negative for visual disturbance.   Respiratory: Negative for cough, chest tightness, shortness of breath and wheezing.    Cardiovascular: Negative for chest pain, palpitations and leg swelling.   Gastrointestinal: Negative for abdominal pain, constipation, diarrhea, nausea and vomiting.   Genitourinary: Negative for dysuria, frequency and urgency.   Musculoskeletal: Positive for arthralgias. Negative for neck pain.   Skin: Negative for rash.   Neurological: Negative for dizziness, weakness and headaches.   Psychiatric/Behavioral:        Patient denies any feelings of depression and has not felt down, hopeless or lost interest in any activities.   All other systems reviewed and are negative.      Objective   Vitals:    10/30/18 0938   BP: 142/86   BP Location: Left arm   Patient Position: Sitting   Cuff Size: Adult   Pulse: 74   Temp: 97.4 °F (36.3 °C)   TempSrc: Oral   Weight: (!)  "145 kg (319 lb)   Height: 193 cm (76\")   PainSc: 0-No pain     Physical Exam   Constitutional: He is oriented to person, place, and time. He appears well-developed and well-nourished. No distress.   HENT:   Head: Normocephalic and atraumatic.   Nose: Nose normal.   Mouth/Throat: Oropharynx is clear and moist. No oropharyngeal exudate.   Eyes: Pupils are equal, round, and reactive to light. Conjunctivae and EOM are normal. No scleral icterus.   Neck: Normal range of motion. Neck supple.   Cardiovascular: Normal rate, regular rhythm and normal heart sounds.  Exam reveals no gallop and no friction rub.    No murmur heard.  Pulmonary/Chest: Effort normal and breath sounds normal. No respiratory distress. He has no wheezes. He has no rales.   Abdominal: Soft. Bowel sounds are normal. He exhibits no distension. There is no tenderness. There is no rebound and no guarding.   Musculoskeletal: Normal range of motion. He exhibits no edema.   Lymphadenopathy:     He has no cervical adenopathy.   Neurological: He is alert and oriented to person, place, and time. No cranial nerve deficit.   Skin: Skin is warm and dry. No rash noted.   Psychiatric: He has a normal mood and affect. His behavior is normal. Judgment and thought content normal.   Nursing note and vitals reviewed.      Assessment/Plan   Suhail was seen today for anxiety.    Diagnoses and all orders for this visit:    Generalized anxiety disorder  -     clonazePAM (KlonoPIN) 2 MG tablet; Take 1 tablet by mouth 3 (Three) Times a Day.    Chronic seasonal allergic rhinitis due to pollen  -     fexofenadine (ALLEGRA) 180 MG tablet; Take 1 tablet by mouth Daily.    Post-traumatic osteoarthritis of right hip  -     cyclobenzaprine (FLEXERIL) 10 MG tablet; Take 1 tablet by mouth 3 (Three) Times a Day As Needed for Muscle Spasms.  -     meloxicam (MOBIC) 15 MG tablet; Take 1 tablet by mouth Daily.         He will continue with Allegra for treatment of the allergic rhinitis.  " He will continue with meloxicam daily and Flexeril up to 3 times a day as needed for muscle spasm associated with the posttraumatic arthritis of his right hip.  He will continue with Klonopin 2 mg up to 3 times a day for his anxiety and panic disorder.    Patient understands the risks associated with this controlled medication, including tolerance and addiction.  Patient also agrees to only obtain this medication from me, and not from a another provider, unless that provider is covering for me in my absence.  Patient also agrees to be compliant in dosing, and not self adjust the dose of medication.  A signed controlled substance agreement is on file, and the patient has received a controlled substance education sheet at this a previous visit.  The patient has also signed a consent for treatment with a controlled substance as per [facility] policy. ABDULLAHI was obtained.    Patient does not want a flu shot today.     PHQ-2/PHQ-9 Depression Screening 10/30/2018   Little interest or pleasure in doing things 0   Feeling down, depressed, or hopeless 0   Total Score 0

## 2018-12-28 ENCOUNTER — OFFICE VISIT (OUTPATIENT)
Dept: FAMILY MEDICINE CLINIC | Facility: CLINIC | Age: 43
End: 2018-12-28

## 2018-12-28 VITALS
HEIGHT: 76 IN | HEART RATE: 83 BPM | SYSTOLIC BLOOD PRESSURE: 136 MMHG | TEMPERATURE: 97.8 F | BODY MASS INDEX: 38.36 KG/M2 | DIASTOLIC BLOOD PRESSURE: 84 MMHG | WEIGHT: 315 LBS

## 2018-12-28 DIAGNOSIS — E66.01 MORBIDLY OBESE (HCC): Chronic | ICD-10-CM

## 2018-12-28 DIAGNOSIS — I51.7 LEFT VENTRICULAR HYPERTROPHY BY ELECTROCARDIOGRAM: ICD-10-CM

## 2018-12-28 DIAGNOSIS — R94.31 ABNORMAL EKG: ICD-10-CM

## 2018-12-28 DIAGNOSIS — F41.1 GENERALIZED ANXIETY DISORDER: Primary | Chronic | ICD-10-CM

## 2018-12-28 PROCEDURE — 93000 ELECTROCARDIOGRAM COMPLETE: CPT | Performed by: INTERNAL MEDICINE

## 2018-12-28 PROCEDURE — 99214 OFFICE O/P EST MOD 30 MIN: CPT | Performed by: INTERNAL MEDICINE

## 2018-12-28 RX ORDER — ALPRAZOLAM 1 MG/1
1 TABLET ORAL 2 TIMES DAILY PRN
Qty: 60 TABLET | Refills: 1 | Status: SHIPPED | OUTPATIENT
Start: 2018-12-28 | End: 2019-01-25

## 2018-12-28 RX ORDER — VENLAFAXINE HYDROCHLORIDE 37.5 MG/1
37.5 CAPSULE, EXTENDED RELEASE ORAL DAILY
Qty: 7 CAPSULE | Refills: 0 | Status: SHIPPED | OUTPATIENT
Start: 2018-12-28 | End: 2019-01-04

## 2018-12-28 RX ORDER — VENLAFAXINE HYDROCHLORIDE 75 MG/1
75 CAPSULE, EXTENDED RELEASE ORAL DAILY
Qty: 30 CAPSULE | Refills: 5 | Status: SHIPPED | OUTPATIENT
Start: 2019-01-05 | End: 2019-02-07

## 2018-12-28 NOTE — PROGRESS NOTES
Chief Complaint   Patient presents with   • Anxiety     due to job, pt cant be on Klonopin, needing med change     Subjective   Suhail Gamino is a 43 y.o. male who presents to the office for follow-up.  He has anxiety with panic disorder, and takes Klonopin 3 times a day.  He has tried reducing the dosage in the past, but started having breakthrough panic attacks.  He is attempting to get a job working at [employer].  He states that he can no longer take Klonopin secondary to this job.  He is wanting to discontinue the medication and change to something different.  He stated that the occupational clinic would be okay with him taking a short acting medication, but not something long-acting Klonopin.  He would like to try getting off of this class of medication altogether.  For the past week, he has been taking a single daily dose of Klonopin 3 mg in an effort to reduce it himself.  He has been tolerating this, although he has had some mild anxiety as the medication wears off later in the day.    He had a preemployment EKG which showed sinus rhythm with a rate of 68, however it also showed very pronounced LVH.  He states that the nurse mentioned that there are EKG machine was not very good.  The company requested that he see a cardiologist for cardiac workup secondary to the EKG.  He has no history of hypertension or any other cardiac problems.    The following portions of the patient's history were reviewed and updated as appropriate: allergies, current medications, past family history, past medical history, past social history, past surgical history and problem list.    Review of Systems   Constitutional: Negative for chills, fatigue and fever.   HENT: Negative for congestion, sneezing, sore throat and trouble swallowing.    Eyes: Negative for visual disturbance.   Respiratory: Negative for cough, chest tightness, shortness of breath and wheezing.    Cardiovascular: Negative for chest pain,  "palpitations and leg swelling.   Gastrointestinal: Negative for abdominal pain, constipation, diarrhea, nausea and vomiting.   Genitourinary: Negative for dysuria, frequency and urgency.   Musculoskeletal: Positive for arthralgias. Negative for neck pain.   Skin: Negative for rash.   Neurological: Negative for dizziness, weakness and headaches.   Psychiatric/Behavioral:        Patient denies any feelings of depression and has not felt down, hopeless or lost interest in any activities.   All other systems reviewed and are negative.      Objective   Vitals:    12/28/18 0855   BP: 136/84   BP Location: Left arm   Patient Position: Sitting   Cuff Size: Adult   Pulse: 83   Temp: 97.8 °F (36.6 °C)   TempSrc: Oral   Weight: (!) 144 kg (318 lb)   Height: 193 cm (76\")   PainSc: 0-No pain     Physical Exam   Constitutional: He is oriented to person, place, and time. He appears well-developed and well-nourished. No distress.   HENT:   Head: Normocephalic and atraumatic.   Nose: Nose normal.   Mouth/Throat: Oropharynx is clear and moist. No oropharyngeal exudate.   Eyes: Conjunctivae and EOM are normal. Pupils are equal, round, and reactive to light. No scleral icterus.   Neck: Normal range of motion. Neck supple.   Cardiovascular: Normal rate, regular rhythm and normal heart sounds. Exam reveals no gallop and no friction rub.   No murmur heard.  Pulmonary/Chest: Effort normal and breath sounds normal. No respiratory distress. He has no wheezes. He has no rales.   Abdominal: Soft. Bowel sounds are normal. He exhibits no distension. There is no tenderness. There is no rebound and no guarding.   Musculoskeletal: Normal range of motion. He exhibits no edema.   Lymphadenopathy:     He has no cervical adenopathy.   Neurological: He is alert and oriented to person, place, and time. No cranial nerve deficit.   Skin: Skin is warm and dry. No rash noted.   Psychiatric: He has a normal mood and affect. His behavior is normal. Judgment " and thought content normal.   Nursing note and vitals reviewed.      Assessment/Plan   Suhail was seen today for anxiety.    Diagnoses and all orders for this visit:    Generalized anxiety disorder  -     ALPRAZolam (XANAX) 1 MG tablet; Take 1 tablet by mouth 2 (Two) Times a Day As Needed for Anxiety.  -     venlafaxine XR (EFFEXOR-XR) 37.5 MG 24 hr capsule; Take 1 capsule by mouth Daily for 7 days. then increase to 75 mg daily.  -     venlafaxine XR (EFFEXOR-XR) 75 MG 24 hr capsule; Take 1 capsule by mouth Daily. Start after completing the week of 37.5 mg.    Morbidly obese (CMS/HCC)    Abnormal EKG  -     ECG 12 Lead    Left ventricular hypertrophy by electrocardiogram  -     Ambulatory Referral to Cardiology         I will start him on Effexor XR for treatment of the anxiety.  He will take 37.5 mg daily for a week and then increase to 75 mg daily.  I will discontinue Klonopin and start him on Xanax since this is a shorter acting medication.  I will then taper the Xanax in an attempt to get him off daily dosing of the benzodiazepines.  He will take Xanax 1 mg twice a day for the next week.  He will then taper to 0.5 mg twice a day for a week, and then taper to 0.5 mg once a day for a week.  He can then discontinue the routine dosing of the medication.  He will keep this on hand and use as needed for panic attacks.    I repeated an EKG in the office today.  This shows normal sinus rhythm with left ventricular hypertrophy.  He also has negative T waves as mentioned in the ECG report below.  I will refer him to cardiology for further evaluation.    Patients BMI indicates that he is overweight.  I discussed the importance of weight loss, and have recommended a diet and exercise regimen.      ECG 12 Lead  Date/Time: 12/28/2018 9:27 AM  Performed by: Champ Sotomayor MD  Authorized by: Champ Sotomayor MD   Comparison: compared with previous ECG from 12/19/2018  Similar to previous ECG  Rhythm: sinus rhythm  Rate:  normal  Conduction: conduction normal  ST Segments: ST segments normal  T depression: I, II, aVL, V2, V3, V4, V5 and V6  QRS axis: normal  Other findings: LVH  Clinical impression: abnormal ECG            PHQ-2/PHQ-9 Depression Screening 12/28/2018   Little interest or pleasure in doing things 0   Feeling down, depressed, or hopeless 0   Total Score 0

## 2019-01-10 ENCOUNTER — OFFICE VISIT (OUTPATIENT)
Dept: CARDIOLOGY | Facility: CLINIC | Age: 44
End: 2019-01-10

## 2019-01-10 VITALS
BODY MASS INDEX: 38.17 KG/M2 | DIASTOLIC BLOOD PRESSURE: 92 MMHG | WEIGHT: 307 LBS | HEIGHT: 75 IN | OXYGEN SATURATION: 99 % | HEART RATE: 83 BPM | SYSTOLIC BLOOD PRESSURE: 134 MMHG

## 2019-01-10 DIAGNOSIS — R06.09 DYSPNEA ON EXERTION: Primary | ICD-10-CM

## 2019-01-10 DIAGNOSIS — I51.7 LVH (LEFT VENTRICULAR HYPERTROPHY): ICD-10-CM

## 2019-01-10 DIAGNOSIS — E66.01 MORBIDLY OBESE (HCC): Chronic | ICD-10-CM

## 2019-01-10 PROCEDURE — 99204 OFFICE O/P NEW MOD 45 MIN: CPT | Performed by: INTERNAL MEDICINE

## 2019-01-10 NOTE — PATIENT INSTRUCTIONS
"DASH Eating Plan  DASH stands for \"Dietary Approaches to Stop Hypertension.\" The DASH eating plan is a healthy eating plan that has been shown to reduce high blood pressure (hypertension). It may also reduce your risk for type 2 diabetes, heart disease, and stroke. The DASH eating plan may also help with weight loss.  What are tips for following this plan?  General guidelines  · Avoid eating more than 2,300 mg (milligrams) of salt (sodium) a day. If you have hypertension, you may need to reduce your sodium intake to 1,500 mg a day.  · Limit alcohol intake to no more than 1 drink a day for nonpregnant women and 2 drinks a day for men. One drink equals 12 oz of beer, 5 oz of wine, or 1½ oz of hard liquor.  · Work with your health care provider to maintain a healthy body weight or to lose weight. Ask what an ideal weight is for you.  · Get at least 30 minutes of exercise that causes your heart to beat faster (aerobic exercise) most days of the week. Activities may include walking, swimming, or biking.  · Work with your health care provider or diet and nutrition specialist (dietitian) to adjust your eating plan to your individual calorie needs.  Reading food labels  · Check food labels for the amount of sodium per serving. Choose foods with less than 5 percent of the Daily Value of sodium. Generally, foods with less than 300 mg of sodium per serving fit into this eating plan.  · To find whole grains, look for the word \"whole\" as the first word in the ingredient list.  Shopping  · Buy products labeled as \"low-sodium\" or \"no salt added.\"  · Buy fresh foods. Avoid canned foods and premade or frozen meals.  Cooking  · Avoid adding salt when cooking. Use salt-free seasonings or herbs instead of table salt or sea salt. Check with your health care provider or pharmacist before using salt substitutes.  · Do not neri foods. Cook foods using healthy methods such as baking, boiling, grilling, and broiling instead.  · Cook with " heart-healthy oils, such as olive, canola, soybean, or sunflower oil.  Meal planning    · Eat a balanced diet that includes:  ? 5 or more servings of fruits and vegetables each day. At each meal, try to fill half of your plate with fruits and vegetables.  ? Up to 6-8 servings of whole grains each day.  ? Less than 6 oz of lean meat, poultry, or fish each day. A 3-oz serving of meat is about the same size as a deck of cards. One egg equals 1 oz.  ? 2 servings of low-fat dairy each day.  ? A serving of nuts, seeds, or beans 5 times each week.  ? Heart-healthy fats. Healthy fats called Omega-3 fatty acids are found in foods such as flaxseeds and coldwater fish, like sardines, salmon, and mackerel.  · Limit how much you eat of the following:  ? Canned or prepackaged foods.  ? Food that is high in trans fat, such as fried foods.  ? Food that is high in saturated fat, such as fatty meat.  ? Sweets, desserts, sugary drinks, and other foods with added sugar.  ? Full-fat dairy products.  · Do not salt foods before eating.  · Try to eat at least 2 vegetarian meals each week.  · Eat more home-cooked food and less restaurant, buffet, and fast food.  · When eating at a restaurant, ask that your food be prepared with less salt or no salt, if possible.  What foods are recommended?  The items listed may not be a complete list. Talk with your dietitian about what dietary choices are best for you.  Grains  Whole-grain or whole-wheat bread. Whole-grain or whole-wheat pasta. Brown rice. Oatmeal. Quinoa. Bulgur. Whole-grain and low-sodium cereals. Christine bread. Low-fat, low-sodium crackers. Whole-wheat flour tortillas.  Vegetables  Fresh or frozen vegetables (raw, steamed, roasted, or grilled). Low-sodium or reduced-sodium tomato and vegetable juice. Low-sodium or reduced-sodium tomato sauce and tomato paste. Low-sodium or reduced-sodium canned vegetables.  Fruits  All fresh, dried, or frozen fruit. Canned fruit in natural juice (without  added sugar).  Meat and other protein foods  Skinless chicken or turkey. Ground chicken or turkey. Pork with fat trimmed off. Fish and seafood. Egg whites. Dried beans, peas, or lentils. Unsalted nuts, nut butters, and seeds. Unsalted canned beans. Lean cuts of beef with fat trimmed off. Low-sodium, lean deli meat.  Dairy  Low-fat (1%) or fat-free (skim) milk. Fat-free, low-fat, or reduced-fat cheeses. Nonfat, low-sodium ricotta or cottage cheese. Low-fat or nonfat yogurt. Low-fat, low-sodium cheese.  Fats and oils  Soft margarine without trans fats. Vegetable oil. Low-fat, reduced-fat, or light mayonnaise and salad dressings (reduced-sodium). Canola, safflower, olive, soybean, and sunflower oils. Avocado.  Seasoning and other foods  Herbs. Spices. Seasoning mixes without salt. Unsalted popcorn and pretzels. Fat-free sweets.  What foods are not recommended?  The items listed may not be a complete list. Talk with your dietitian about what dietary choices are best for you.  Grains  Baked goods made with fat, such as croissants, muffins, or some breads. Dry pasta or rice meal packs.  Vegetables  Creamed or fried vegetables. Vegetables in a cheese sauce. Regular canned vegetables (not low-sodium or reduced-sodium). Regular canned tomato sauce and paste (not low-sodium or reduced-sodium). Regular tomato and vegetable juice (not low-sodium or reduced-sodium). Pickles. Olives.  Fruits  Canned fruit in a light or heavy syrup. Fried fruit. Fruit in cream or butter sauce.  Meat and other protein foods  Fatty cuts of meat. Ribs. Fried meat. Alan. Sausage. Bologna and other processed lunch meats. Salami. Fatback. Hotdogs. Bratwurst. Salted nuts and seeds. Canned beans with added salt. Canned or smoked fish. Whole eggs or egg yolks. Chicken or turkey with skin.  Dairy  Whole or 2% milk, cream, and half-and-half. Whole or full-fat cream cheese. Whole-fat or sweetened yogurt. Full-fat cheese. Nondairy creamers. Whipped toppings.  Processed cheese and cheese spreads.  Fats and oils  Butter. Stick margarine. Lard. Shortening. Ghee. Alan fat. Tropical oils, such as coconut, palm kernel, or palm oil.  Seasoning and other foods  Salted popcorn and pretzels. Onion salt, garlic salt, seasoned salt, table salt, and sea salt. Worcestershire sauce. Tartar sauce. Barbecue sauce. Teriyaki sauce. Soy sauce, including reduced-sodium. Steak sauce. Canned and packaged gravies. Fish sauce. Oyster sauce. Cocktail sauce. Horseradish that you find on the shelf. Ketchup. Mustard. Meat flavorings and tenderizers. Bouillon cubes. Hot sauce and Tabasco sauce. Premade or packaged marinades. Premade or packaged taco seasonings. Relishes. Regular salad dressings.  Where to find more information:  · National Heart, Lung, and Blood [place]: www.nhlbi.nih.gov  · American Heart Association: www.heart.org  Summary  · The DASH eating plan is a healthy eating plan that has been shown to reduce high blood pressure (hypertension). It may also reduce your risk for type 2 diabetes, heart disease, and stroke.  · With the DASH eating plan, you should limit salt (sodium) intake to 2,300 mg a day. If you have hypertension, you may need to reduce your sodium intake to 1,500 mg a day.  · When on the DASH eating plan, aim to eat more fresh fruits and vegetables, whole grains, lean proteins, low-fat dairy, and heart-healthy fats.  · Work with your health care provider or diet and nutrition specialist (dietitian) to adjust your eating plan to your individual calorie needs.  This information is not intended to replace advice given to you by your health care provider. Make sure you discuss any questions you have with your health care provider.  Document Released: 12/06/2012 Document Revised: 12/11/2017 Document Reviewed: 12/11/2017  [employer] Patient Education © 2018 [employer]  Echocardiogram  An echocardiogram, or echocardiography, uses sound waves (ultrasound) to  produce an image of your heart. The echocardiogram is simple, painless, obtained within a short period of time, and offers valuable information to your health care provider. The images from an echocardiogram can provide information such as:  · Evidence of coronary artery disease (CAD).  · Heart size.  · Heart muscle function.  · Heart valve function.  · Aneurysm detection.  · Evidence of a past heart attack.  · Fluid buildup around the heart.  · Heart muscle thickening.  · Assess heart valve function.    Tell a health care provider about:  · Any allergies you have.  · All medicines you are taking, including vitamins, herbs, eye drops, creams, and over-the-counter medicines.  · Any problems you or family members have had with anesthetic medicines.  · Any blood disorders you have.  · Any surgeries you have had.  · Any medical conditions you have.  · Whether you are pregnant or may be pregnant.  What happens before the procedure?  No special preparation is needed. Eat and drink normally.  What happens during the procedure?  · In order to produce an image of your heart, gel will be applied to your chest and a wand-like tool (transducer) will be moved over your chest. The gel will help transmit the sound waves from the transducer. The sound waves will harmlessly bounce off your heart to allow the heart images to be captured in real-time motion. These images will then be recorded.  · You may need an IV to receive a medicine that improves the quality of the pictures.  What happens after the procedure?  You may return to your normal schedule including diet, activities, and medicines, unless your health care provider tells you otherwise.  This information is not intended to replace advice given to you by your health care provider. Make sure you discuss any questions you have with your health care provider.  Document Released: 12/15/2001 Document Revised: 08/05/2017 Document Reviewed: 08/25/2014  [employer] Patient  Education © 2017 [employer]

## 2019-01-10 NOTE — PROGRESS NOTES
Cardiovascular Medicine      Yony Greene M.D., Ph.D., [facility]         Champ Sotomayor MD  [address] [place] [address] [place] [address]    Thank you for asking me to see Suhail Gamino for dyspnea and abnormal EKG.    History of Present Illness  This is a 43 y.o. male with:    1.  SCHAFER  2.  LVH by voltage  3.  Obesity  4.  Former smoker    Suhail Gamino is a 43 y.o. male who presents for consultation today.  He's referred from primary care primarily for abnormal EKG.  He had an EKG that was obtained which showed significant voltage criteria for LVH with significant secondary changes and repolarization abnormalities.  He was seen for pre-employment EKG screening. He was told it was abnormal. He then saw his PCP who repeated the EKG. He is referred for further evaluation. He denies a history of MI, CHF or CAD.  He is obese and sedentary.  He does complain of exertional dyspnea.  He's had no resting, exertional or nocturnal angina. He reports dyspnea, but only with extreme exertion. He does snore when he sleeps.     Review of Systems - Review of Systems   Cardiovascular: Positive for dyspnea on exertion. Negative for chest pain, claudication, cyanosis, irregular heartbeat, leg swelling, near-syncope, orthopnea, palpitations, paroxysmal nocturnal dyspnea and syncope.   Respiratory: Positive for shortness of breath. Negative for cough, hemoptysis, sleep disturbances due to breathing, snoring, sputum production and wheezing.         All other systems were reviewed and were negative.    family history includes Cancer in his paternal grandfather; Hypertension in his father; No Known Problems in his daughter, daughter, mother, sister, sister, sister, son, and son.     reports that he quit smoking about 20 years ago. His smoking use included cigarettes. His smokeless tobacco use includes snuff. He reports that he does not drink alcohol or use drugs.    No Known Allergies      Current Outpatient  Medications:   •  ALPRAZolam (XANAX) 1 MG tablet, Take 1 tablet by mouth 2 (Two) Times a Day As Needed for Anxiety., Disp: 60 tablet, Rfl: 1  •  cyclobenzaprine (FLEXERIL) 10 MG tablet, Take 1 tablet by mouth 3 (Three) Times a Day As Needed for Muscle Spasms., Disp: 60 tablet, Rfl: 3  •  fexofenadine (ALLEGRA) 180 MG tablet, Take 1 tablet by mouth Daily., Disp: 30 tablet, Rfl: 5  •  meloxicam (MOBIC) 15 MG tablet, Take 1 tablet by mouth Daily., Disp: 30 tablet, Rfl: 5  •  venlafaxine XR (EFFEXOR-XR) 75 MG 24 hr capsule, Take 1 capsule by mouth Daily. Start after completing the week of 37.5 mg., Disp: 30 capsule, Rfl: 5    Physical Exam:  Physical Exam:  Vitals:    01/10/19 1024   BP: 134/92   Pulse:    SpO2:      Body mass index is 38.37 kg/m².   Pulse Ox: Normal  on room air  General: alert, appears stated age and cooperative     Body Habitus: well-nourished    HEENT: Head: Normocephalic, no lesions, without obvious abnormality. No arcus senilis, xanthelasma or xanthomas.    JVP: Volume/Pulsation: Normal  Carotid Exam: no bruit normal pulsation bilaterally   Carotid Volume: normal    Subclavian Bruit: absent  Vertebral Bruit: absent  Respirations: no increased work of breathing   Chest:  Normal    Pulmonary:Normal     Precordium: Normal impulses. P2 is not palpable.   [place]:  normal size and placement Palpable S4: absent.  Heart rate: normal    Heart Rhythm: regular     Heart Sounds: S1: normal  S2: normal intensity  S3: absent   S4: absent  Opening Snap: absent  A2-OS:  absent.   Pericardial rub: absent    Ejection click: None      Murmurs:  absent   Extremity: moves all extremities equally.   Pulses: 2+ and symmetric      DATA REVIEWED:       --------------------------------------------------------------------------------------------------  LABS:   Lab Results   Component Value Date    GLUCOSE 88 05/01/2018    BUN 10 05/01/2018    CREATININE 0.90 05/01/2018    EGFRIFNONA 93 05/01/2018    BCR 11.1 05/01/2018    K  4.4 05/01/2018    CO2 29.0 05/01/2018    CALCIUM 9.1 05/01/2018    ALBUMIN 4.00 05/01/2018    AST 14 05/01/2018    ALT 18 05/01/2018     Lab Results   Component Value Date    WBC 7.81 05/01/2018    HGB 16.5 05/01/2018    HCT 50.4 (H) 05/01/2018    MCV 86.9 05/01/2018     05/01/2018     Lab Results   Component Value Date    CHOL 166 05/01/2018    TRIG 66 05/01/2018    HDL 51 05/01/2018     05/01/2018     Lab Results   Component Value Date    TSH 2.180 05/01/2018     No results found for: CKTOTAL, CKMB, CKMBINDEX, TROPONINI, TROPONINT  No results found for: HGBA1C  No results found for: DDIMER  Lab Results   Component Value Date    ALT 18 05/01/2018     No results found for: HGBA1C  Lab Results   Component Value Date    CREATININE 0.90 05/01/2018     No results found for: IRON, TIBC, FERRITIN  No results found for: INR, PROTIME    Assessment/Plan      Diagnosis Plan   1. Dyspnea on exertion. I suspect this is multifactorial. He doesn't report dyspnea, except with intense exertion. Since his EKG is abnormal, will proceed with structural eval.  · Treadmill echo stress, TTE   2. LVH (left ventricular hypertrophy). I informed him of the sens/spec of EKG with LVH. With that said, his EKG demonstrates significant voltage.  · As above   3. Obese · General patient education:  -Weight loss hand-out  -Exercise intervention:   Hand out, increase aerobic activity  -PCP Follow-up       Return in about 3 weeks (around 1/31/2019).

## 2019-01-22 ENCOUNTER — HOSPITAL ENCOUNTER (OUTPATIENT)
Dept: CARDIOLOGY | Facility: HOSPITAL | Age: 44
Discharge: HOME | End: 2019-01-22
Attending: INTERNAL MEDICINE | Admitting: INTERNAL MEDICINE

## 2019-01-22 LAB
BH CV ECHO LEFT VENTRICLE BASAL CAVITARY GRADIENT: 9 MMHG
BH CV ECHO MEAS - ACS: 2.6 CM
BH CV ECHO MEAS - AI DEC SLOPE: 123 CM/SEC^2
BH CV ECHO MEAS - AI MAX PG: 76 MMHG
BH CV ECHO MEAS - AI MAX VEL: 436 CM/SEC
BH CV ECHO MEAS - AI P1/2T: 1038 MSEC
BH CV ECHO MEAS - AO MAX PG (FULL): 17.4 MMHG
BH CV ECHO MEAS - AO MAX PG: 23 MMHG
BH CV ECHO MEAS - AO MEAN PG (FULL): 11 MMHG
BH CV ECHO MEAS - AO MEAN PG: 14 MMHG
BH CV ECHO MEAS - AO ROOT AREA (BSA CORRECTED): 1.3
BH CV ECHO MEAS - AO ROOT AREA: 9.1 CM^2
BH CV ECHO MEAS - AO ROOT DIAM: 3.4 CM
BH CV ECHO MEAS - AO V2 MAX: 240 CM/SEC
BH CV ECHO MEAS - AO V2 MEAN: 177 CM/SEC
BH CV ECHO MEAS - AO V2 VTI: 46.6 CM
BH CV ECHO MEAS - ASC AORTA: 3.9 CM
BH CV ECHO MEAS - AVA(I,A): 2.2 CM^2
BH CV ECHO MEAS - AVA(I,D): 2.2 CM^2
BH CV ECHO MEAS - AVA(V,A): 2.1 CM^2
BH CV ECHO MEAS - AVA(V,D): 2.1 CM^2
BH CV ECHO MEAS - BSA(HAYCOCK): 2.8 M^2
BH CV ECHO MEAS - BSA: 2.6 M^2
BH CV ECHO MEAS - BZI_BMI: 38.4 KILOGRAMS/M^2
BH CV ECHO MEAS - BZI_METRIC_HEIGHT: 190.5 CM
BH CV ECHO MEAS - BZI_METRIC_WEIGHT: 139.3 KG
BH CV ECHO MEAS - EDV(CUBED): 125 ML
BH CV ECHO MEAS - EDV(TEICH): 118.2 ML
BH CV ECHO MEAS - EF(CUBED): 78.4 %
BH CV ECHO MEAS - EF(TEICH): 70.4 %
BH CV ECHO MEAS - ESV(CUBED): 27 ML
BH CV ECHO MEAS - ESV(TEICH): 35 ML
BH CV ECHO MEAS - FS: 40 %
BH CV ECHO MEAS - IVS/LVPW: 2.1
BH CV ECHO MEAS - IVSD: 2.3 CM
BH CV ECHO MEAS - LA DIMENSION: 4.3 CM
BH CV ECHO MEAS - LA/AO: 1.3
BH CV ECHO MEAS - LV MASS(C)D: 389.7 GRAMS
BH CV ECHO MEAS - LV MASS(C)DI: 148 GRAMS/M^2
BH CV ECHO MEAS - LV MAX PG: 5.7 MMHG
BH CV ECHO MEAS - LV MEAN PG: 3 MMHG
BH CV ECHO MEAS - LV V1 MAX: 119 CM/SEC
BH CV ECHO MEAS - LV V1 MEAN: 82.4 CM/SEC
BH CV ECHO MEAS - LV V1 VTI: 24.9 CM
BH CV ECHO MEAS - LVIDD: 5 CM
BH CV ECHO MEAS - LVIDS: 3 CM
BH CV ECHO MEAS - LVOT AREA (M): 4.2 CM^2
BH CV ECHO MEAS - LVOT AREA: 4.2 CM^2
BH CV ECHO MEAS - LVOT DIAM: 2.3 CM
BH CV ECHO MEAS - LVPWD: 1.1 CM
BH CV ECHO MEAS - MR MAX PG: 30.5 MMHG
BH CV ECHO MEAS - MR MAX VEL: 276 CM/SEC
BH CV ECHO MEAS - MV A MAX VEL: 66.7 CM/SEC
BH CV ECHO MEAS - MV DEC SLOPE: 345 CM/SEC^2
BH CV ECHO MEAS - MV E MAX VEL: 56.7 CM/SEC
BH CV ECHO MEAS - MV E/A: 0.85
BH CV ECHO MEAS - MV P1/2T MAX VEL: 72.8 CM/SEC
BH CV ECHO MEAS - MV P1/2T: 61.8 MSEC
BH CV ECHO MEAS - MVA P1/2T LCG: 3 CM^2
BH CV ECHO MEAS - MVA(P1/2T): 3.6 CM^2
BH CV ECHO MEAS - PA MAX PG: 4.8 MMHG
BH CV ECHO MEAS - PA V2 MAX: 110 CM/SEC
BH CV ECHO MEAS - RAP SYSTOLE: 5 MMHG
BH CV ECHO MEAS - RVSP: 27.7 MMHG
BH CV ECHO MEAS - SI(AO): 160.7 ML/M^2
BH CV ECHO MEAS - SI(CUBED): 37.2 ML/M^2
BH CV ECHO MEAS - SI(LVOT): 39.3 ML/M^2
BH CV ECHO MEAS - SI(TEICH): 31.6 ML/M^2
BH CV ECHO MEAS - SV(AO): 423.1 ML
BH CV ECHO MEAS - SV(CUBED): 98 ML
BH CV ECHO MEAS - SV(LVOT): 103.5 ML
BH CV ECHO MEAS - SV(TEICH): 83.2 ML
BH CV ECHO MEAS - TR MAX VEL: 238 CM/SEC
BH CV STRESS BP STAGE 1: NORMAL
BH CV STRESS BP STAGE 2: NORMAL
BH CV STRESS DURATION MIN STAGE 1: 3
BH CV STRESS DURATION MIN STAGE 2: 3
BH CV STRESS DURATION MIN STAGE 3: 1
BH CV STRESS DURATION SEC STAGE 1: 0
BH CV STRESS DURATION SEC STAGE 2: 0
BH CV STRESS DURATION SEC STAGE 3: 17
BH CV STRESS ECHO POST STRESS EJECTION FRACTION EF: 75 %
BH CV STRESS GRADE STAGE 1: 10
BH CV STRESS GRADE STAGE 2: 12
BH CV STRESS GRADE STAGE 3: 14
BH CV STRESS HR STAGE 1: 123
BH CV STRESS HR STAGE 2: 146
BH CV STRESS HR STAGE 3: 162
BH CV STRESS METS STAGE 1: 5
BH CV STRESS METS STAGE 2: 7.5
BH CV STRESS METS STAGE 3: 10
BH CV STRESS PROTOCOL 1: NORMAL
BH CV STRESS RECOVERY BP: NORMAL MMHG
BH CV STRESS RECOVERY HR: 93 BPM
BH CV STRESS SPEED STAGE 1: 1.7
BH CV STRESS SPEED STAGE 2: 2.5
BH CV STRESS SPEED STAGE 3: 3.4
BH CV STRESS STAGE 1: 1
BH CV STRESS STAGE 2: 2
BH CV STRESS STAGE 3: 3
MAXIMAL PREDICTED HEART RATE: 179 BPM
MAXIMAL PREDICTED HEART RATE: 179 BPM
PERCENT MAX PREDICTED HR: 91.62 %
STRESS BASELINE BP: NORMAL MMHG
STRESS BASELINE HR: 88 BPM
STRESS PERCENT HR: 108 %
STRESS POST ESTIMATED WORKLOAD: 8.9 METS
STRESS POST EXERCISE DUR MIN: 7 MIN
STRESS POST EXERCISE DUR SEC: 17 SEC
STRESS POST PEAK BP: NORMAL MMHG
STRESS POST PEAK HR: 164 BPM
STRESS TARGET HR: 152 BPM
STRESS TARGET HR: 152 BPM

## 2019-01-22 PROCEDURE — 93352 ADMIN ECG CONTRAST AGENT: CPT | Performed by: INTERNAL MEDICINE

## 2019-01-22 PROCEDURE — 93350 STRESS TTE ONLY: CPT

## 2019-01-22 PROCEDURE — 25010000002 PERFLUTREN (DEFINITY) 8.476 MG IN SODIUM CHLORIDE 0.9 % 10 ML INJECTION: Performed by: INTERNAL MEDICINE

## 2019-01-22 PROCEDURE — 93351 STRESS TTE COMPLETE: CPT | Performed by: INTERNAL MEDICINE

## 2019-01-22 PROCEDURE — 93017 CV STRESS TEST TRACING ONLY: CPT

## 2019-01-22 PROCEDURE — 93320 DOPPLER ECHO COMPLETE: CPT

## 2019-01-22 PROCEDURE — 93325 DOPPLER ECHO COLOR FLOW MAPG: CPT

## 2019-01-22 RX ADMIN — SODIUM CHLORIDE 1.5 ML: 9 INJECTION INTRAMUSCULAR; INTRAVENOUS; SUBCUTANEOUS at 13:56

## 2019-01-24 ENCOUNTER — OFFICE VISIT (OUTPATIENT)
Dept: CARDIOLOGY | Facility: CLINIC | Age: 44
End: 2019-01-24

## 2019-01-24 VITALS
OXYGEN SATURATION: 99 % | HEART RATE: 102 BPM | BODY MASS INDEX: 38.17 KG/M2 | WEIGHT: 307 LBS | HEIGHT: 75 IN | DIASTOLIC BLOOD PRESSURE: 88 MMHG | SYSTOLIC BLOOD PRESSURE: 142 MMHG

## 2019-01-24 DIAGNOSIS — R00.2 PALPITATION: ICD-10-CM

## 2019-01-24 DIAGNOSIS — E66.01 MORBIDLY OBESE (HCC): Chronic | ICD-10-CM

## 2019-01-24 DIAGNOSIS — I42.2 ASYMMETRIC SEPTAL HYPERTROPHY (HCC): Primary | ICD-10-CM

## 2019-01-24 PROCEDURE — 99213 OFFICE O/P EST LOW 20 MIN: CPT | Performed by: INTERNAL MEDICINE

## 2019-01-24 NOTE — PROGRESS NOTES
Cardiovascular Medicine      Yony Greene M.D., Ph.D., [facility]             History of Present Illness  This is a 43 y.o. male with:    1.  Asymmetric septal hypertrophy  2.  Palpitations  3. Ectasia of TAA  4.  Obesity      Suhail Gamino is a 43 y.o. male who returns to clinic today.  He was found to have marketed LVH on EKG without a significant history of hypertension.  I was concerned for possible hypertrophic cardiomyopathy so he was referred for a 2-D echocardiogram.  Unfortunately, this shows marked with asymmetric septal hypertrophy.  No increased gradients.  He's had some mild dyspnea on exertion, but no resting, exertional or nocturnal angina.  He's had increased cardiac awareness which she characterizes as palpitations.  There is no family history of HCM or SCA.  He was also found have ectasia of his thoracic aorta at 3.7 cm.    Review of Systems - Review of Systems   Cardiovascular: Positive for dyspnea on exertion. Negative for chest pain, claudication, cyanosis, irregular heartbeat, leg swelling, near-syncope, orthopnea, palpitations, paroxysmal nocturnal dyspnea and syncope.   Respiratory: Positive for shortness of breath. Negative for cough, hemoptysis, sleep disturbances due to breathing, snoring, sputum production and wheezing.         All other systems were reviewed and were negative.    family history includes Cancer in his paternal grandfather; Hypertension in his father; No Known Problems in his daughter, daughter, mother, sister, sister, sister, son, and son.     reports that he quit smoking about 20 years ago. His smoking use included cigarettes. His smokeless tobacco use includes snuff. He reports that he does not drink alcohol or use drugs.    No Known Allergies      Current Outpatient Medications:   •  ALPRAZolam (XANAX) 1 MG tablet, Take 1 tablet by mouth 2 (Two) Times a Day As Needed for Anxiety., Disp: 60 tablet, Rfl: 1  •  cyclobenzaprine (FLEXERIL) 10 MG tablet,  Take 1 tablet by mouth 3 (Three) Times a Day As Needed for Muscle Spasms., Disp: 60 tablet, Rfl: 3  •  fexofenadine (ALLEGRA) 180 MG tablet, Take 1 tablet by mouth Daily., Disp: 30 tablet, Rfl: 5  •  meloxicam (MOBIC) 15 MG tablet, Take 1 tablet by mouth Daily., Disp: 30 tablet, Rfl: 5  •  venlafaxine XR (EFFEXOR-XR) 75 MG 24 hr capsule, Take 1 capsule by mouth Daily. Start after completing the week of 37.5 mg., Disp: 30 capsule, Rfl: 5    Physical Exam:  Physical Exam:  Vitals:    01/24/19 1048   BP: 142/88   Pulse: 102   SpO2: 99%     Body mass index is 38.37 kg/m².   Pulse Ox: Normal  on room air  General: alert, appears stated age and cooperative     Body Habitus: well-nourished    HEENT: Head: Normocephalic, no lesions, without obvious abnormality. No arcus senilis, xanthelasma or xanthomas.    JVP: Volume/Pulsation: Normal  Carotid Exam: no bruit normal pulsation bilaterally   Carotid Volume: normal    Subclavian Bruit: absent  Vertebral Bruit: absent  Respirations: no increased work of breathing   Chest:  Normal    Pulmonary:Normal     Precordium: Normal impulses. P2 is not palpable.   [place]:  normal size and placement Palpable S4: absent.  Heart rate: normal    Heart Rhythm: regular     Heart Sounds: S1: normal  S2: normal intensity  S3: absent   S4: absent  Opening Snap: absent  A2-OS:  absent.   Pericardial rub: absent    Ejection click: None      Murmurs:  absent   Extremity: moves all extremities equally.   Pulses: 2+ and symmetric      DATA REVIEWED:     Results for orders placed in visit on 01/10/19   Adult Stress Echo W/ Cont or Stress Agent if Necessary Per Protocol    Narrative · Left ventricle systolic function is normal. LVEF is 65%. Left   ventricular wall thickness is consistent with moderate septal asymmetric   hypertrophy.  · Target heart rate achieved. Asymptomatic test.  · Segment augmentation had a normal response to stress. Cavity size   behaved normally in response to stress. Left  ventricular function is   increased hyperdynamic (>70%).  · Normal stress echo with no significant echocardiographic evidence for   myocardial ischemia            --------------------------------------------------------------------------------------------------  LABS:   Lab Results   Component Value Date    GLUCOSE 88 05/01/2018    BUN 10 05/01/2018    CREATININE 0.90 05/01/2018    EGFRIFNONA 93 05/01/2018    BCR 11.1 05/01/2018    K 4.4 05/01/2018    CO2 29.0 05/01/2018    CALCIUM 9.1 05/01/2018    ALBUMIN 4.00 05/01/2018    AST 14 05/01/2018    ALT 18 05/01/2018     Lab Results   Component Value Date    WBC 7.81 05/01/2018    HGB 16.5 05/01/2018    HCT 50.4 (H) 05/01/2018    MCV 86.9 05/01/2018     05/01/2018     Lab Results   Component Value Date    CHOL 166 05/01/2018    TRIG 66 05/01/2018    HDL 51 05/01/2018     05/01/2018     Lab Results   Component Value Date    TSH 2.180 05/01/2018     No results found for: CKTOTAL, CKMB, CKMBINDEX, TROPONINI, TROPONINT  No results found for: HGBA1C  No results found for: DDIMER  Lab Results   Component Value Date    ALT 18 05/01/2018     No results found for: HGBA1C  Lab Results   Component Value Date    CREATININE 0.90 05/01/2018     No results found for: IRON, TIBC, FERRITIN  No results found for: INR, PROTIME    Assessment/Plan      Diagnosis Plan   1. Asymmetric septal hypertrophy (CMS/HCC).  My concerns is that this represented HCM.  I pulled up the pictures with him in the room and went over his images as well as the workup for this.  I recommended a cardiac MRI and ZIO.  Of also recommended he not engage in competitive sports or exertional activity that is strenuous until the workup is complete.  Hand out was provided.   · CMRI, structrural with Barrett, no stress  · ZIO  · Activity precautions were discussed    2. Palpitation  Holter Monitor - 72 Hour Up To 21 Days   3. Morbidly obese (CMS/HCC)  · General patient education:  -Weight loss  hand-out  -Exercise intervention:   Hand out, increase aerobic activity  -PCP Follow-up         Return in about 2 months (around 3/24/2019).

## 2019-01-24 NOTE — PATIENT INSTRUCTIONS
Cardiac Event Monitoring  A cardiac event monitor is a small recording device that is used to detect abnormal heart rhythms (arrhythmias). The monitor is used to record your heart rhythm when you have symptoms, such as:  · Fast heartbeats (palpitations), such as heart racing or fluttering.  · Dizziness.  · Fainting or light-headedness.  · Unexplained weakness.    Some monitors are wired to electrodes placed on your chest. Electrodes are flat, sticky disks that attach to your skin. Other monitors may be hand-held or worn on the wrist. The monitor can be worn for up to 30 days.  If the monitor is attached to your chest, a technician will prepare your chest for the electrode placement and show you how to work the monitor. Take time to practice using the monitor before you leave the office. Make sure you understand how to send the information from the monitor to your health care provider. In some cases, you may need to use a landline telephone instead of a cell phone.  What are the risks?  Generally, this device is safe to use, but it possible that the skin under the electrodes will become irritated.  How to use your cardiac event monitor  · Wear your monitor at all times, except when you are in water:  ? Do not let the monitor get wet.  ? Take the monitor off when you bathe. Do not swim or use a hot tub with it on.  · Keep your skin clean. Do not put body lotion or moisturizer on your chest.  · Change the electrodes as told by your health care provider or any time they stop sticking to your skin. You may need to use medical tape to keep them on.  · Try to put the electrodes in slightly different places on your chest to help prevent skin irritation. They must remain in the area under your left breast and in the upper right section of your chest.  · Make sure the monitor is safely clipped to your clothing or in a location close to your body that your health care provider recommends.  · Press the button to record as soon  as you feel heart-related symptoms, such as:  ? Dizziness.  ? Weakness.  ? Light-headedness.  ? Palpitations.  ? Thumping or pounding in your chest.  ? Shortness of breath.  ? Unexplained weakness.  · Keep a diary of your activities, such as walking, doing chores, and taking medicine. It is very important to note what you were doing when you pushed the button to record your symptoms. This will help your health care provider determine what might be contributing to your symptoms.  · Send the recorded information as recommended by your health care provider. It may take some time for your health care provider to process the results.  · Change the batteries as told by your health care provider.  · Keep electronic devices away from your monitor. This includes:  ? Tablets.  ? Closetbox3 players.  ? Cell phones.  · While wearing your monitor you should avoid:  ? Electric blankets.  ? Electric razors.  ? Electric toothbrushes.  ? Microwave ovens.  ? Magnets.  ? Metal detectors.  Get help right away if:  · You have chest pain.  · You have extreme difficulty breathing or shortness of breath.  · You develop a very fast heartbeat that persists.  · You develop dizziness that does not go away.  · You faint or constantly feel like you are about to faint.  Summary  · A cardiac event monitor is a small recording device that is used to help detect abnormal heart rhythms (arrhythmias).  · The monitor is used to record your heart rhythm when you have heart-related symptoms.  · Make sure you understand how to send the information from the monitor to your health care provider.  · It is important to press the button on the monitor when you have any heart-related symptoms.  · Keep a diary of your activities, such as walking, doing chores, and taking medicine. It is very important to note what you were doing when you pushed the button to record your symptoms. This will help your health care provider learn what might be causing your symptoms.  This  information is not intended to replace advice given to you by your health care provider. Make sure you discuss any questions you have with your health care provider.  Document Released: 09/26/2009 Document Revised: 12/02/2017 Document Reviewed: 12/02/2017  [employer] Interactive Patient Education © 2017 [employer]  Hypertrophic Cardiomyopathy  Hypertrophic cardiomyopathy (HCM) is a heart condition in which part of the heart muscle gets too thick. The condition can cause a dangerous and abnormal heart rhythm. It can also weaken the heart over time.  The heart is divided into four chambers. The thickening usually affects the pumping chamber on the lower left (left ventricle). HCM may also affect the valve that lets blood flow out of the left ventricle (mitral valve). Symptoms often begin at about age 30.  What are the causes?  This condition is usually caused by abnormal genes that control heart muscle growth. These abnormal genes are passed down through families (are inherited).  What increases the risk?  This condition is more likely to develop in people with a family history of HCM.  What are the signs or symptoms?  Symptoms of this condition include:  · Shortness of breath, especially after exercising or lying down.  · Chest pain.  · Dizziness.  · Fatigue.  · Irregular or fast heart rate.  · Fainting, especially after physical activity.    How is this diagnosed?  This condition is diagnosed based on the results of a physical exam that involves checking for an abnormal heart sound (heart murmur). Tests may be done, including:  · An electrocardiogram (ECG). This test records your heart's electrical activity.  · An echocardiogram. This test can show whether your left ventricle is enlarged and whether it fills slowly.  · A Doppler test. This test shows irregular blood flow and pressure differences inside the heart.  · A chest X-ray. This test can show whether your heart is enlarged.  · An MRI.    How is this  treated?  Treatment for HCM depends on how severe your symptoms are. There are several options for treatment, including.  · Medicine. Medicines can be given to:  ? Reduce the workload of your heart.  ? Lower your blood pressure.  ? Thin your blood and prevent clots.  · A device. Devices that can be used to treat this condition include:  ? A pacemaker. This device helps to control your heartbeat.  ? A defibrillator. This device restores a normal heart rhythm.  · Surgery. This may include a procedure to:  ? Inject alcohol into the small blood vessels that supply your heart muscle (alcohol septal ablation). This is done during a procedure called cardiac catheterization. The goal is to cause the muscle to become thinner.  ? Remove part of the wall that divides the right and left sides of the heart (septum).  ? Replace the mitral valve.    Follow these instructions at home:  · Avoid strenuous exercise and activities, like heavy lifting or shoveling snow.  · Make sure the members of your household know how to do CPR in case of an emergency.  · Take over-the-counter and prescription medicines only as told by your health care provider.  · Follow a healthy diet and maintain a healthy weight. If you need help with losing weight, ask your health care provider.  · Limit alcohol intake to no more than 1 drink per day for nonpregnant women and 2 drinks per day for men. One drink equals 12 oz of beer, 5 oz of wine, or 1½ oz of hard liquor.  · Do not use any products that contain nicotine or tobacco, such as cigarettes and e-cigarettes. If you need help quitting, ask your health care provider.  · Keep all follow-up visits as directed by your health care provider. This is important.  Contact a health care provider if:  · You have new symptoms.  · Your symptoms get worse.  Get help right away if:  · You have chest pain or shortness of breath, especially during or after sports.  · You feel faint or you pass out.  · You have trouble  breathing even at rest.  · Your feet or ankles swell.  · Your heartbeat seems irregular or seems faster than normal (palpitations).  · Your heartbeat seems abnormal.  This information is not intended to replace advice given to you by your health care provider. Make sure you discuss any questions you have with your health care provider.  Document Released: 11/23/2005 Document Revised: 11/10/2017 Document Reviewed: 11/10/2017  [employer] Interactive Patient Education © 2018 [employer]    Magnetic Resonance Imaging  Magnetic resonance imaging (MRI) is a painless test that takes pictures of the inside of your body. This test uses a strong magnet. This test does not use X-rays or radiation.  What happens before the procedure?  · You will be asked to take off all metal. This includes:  ? Your watch, jewelry, and other metal items.  ? Some makeup may have very small bits of metal and may need to be taken off.  ? Braces and fillings normally are not a problem.  What happens during the procedure?  · You may be given earplugs or headphones to listen to music. The machine can be noisy.  · You might get a shot (injection) with a dye (contrast material) to help the MRI take better pictures.  · MRI is done in a tunnel-shaped scanner. You will lie on a table that slides into the tunnel-shaped scanner. Once inside, you will still be able to talk to the person doing the test.  · You will be asked to hold very still. You will be told when you can shift position. You may have to wait a few minutes to make sure the images are readable.  What happens after the procedure?  · You may go back to your normal activities right away.  · If you got a shot of dye, it will pass naturally through your body within a day.  · Your doctor will talk to you about the results.  This information is not intended to replace advice given to you by your health care provider. Make sure you discuss any questions you have with your health care  provider.  Document Released: 01/20/2012 Document Revised: 05/25/2017 Document Reviewed: 02/12/2015  [employer] Interactive Patient Education © 2018 [employer]

## 2019-01-25 ENCOUNTER — OFFICE VISIT (OUTPATIENT)
Dept: FAMILY MEDICINE CLINIC | Facility: CLINIC | Age: 44
End: 2019-01-25

## 2019-01-25 VITALS
TEMPERATURE: 97.4 F | HEART RATE: 82 BPM | HEIGHT: 75 IN | SYSTOLIC BLOOD PRESSURE: 136 MMHG | DIASTOLIC BLOOD PRESSURE: 82 MMHG | BODY MASS INDEX: 37.8 KG/M2 | WEIGHT: 304 LBS

## 2019-01-25 DIAGNOSIS — F41.1 GENERALIZED ANXIETY DISORDER: Primary | Chronic | ICD-10-CM

## 2019-01-25 DIAGNOSIS — I42.2 ASYMMETRIC SEPTAL HYPERTROPHY (HCC): ICD-10-CM

## 2019-01-25 PROCEDURE — 99213 OFFICE O/P EST LOW 20 MIN: CPT | Performed by: INTERNAL MEDICINE

## 2019-01-25 NOTE — PROGRESS NOTES
Chief Complaint   Patient presents with   • Anxiety     work physician is needing effexor changed     Subjective   Suhail Gamino is a 43 y.o. male who presents to the office for follow-up.  He continues to work on preemployment with [employer].  At the last visit, he had an abnormal EKG.  I referred him to cardiology.  He is in the process of having this evaluated and will have a cardiac MRI in the near future.  He has history of panic disorder and generalized anxiety disorder.  At the last visit, I changed him from Klonopin to Xanax, and he tapered off of the Xanax over a 2-week period.  He has been doing well without the medication.  I started him on Effexor XR secondary to the anxiety.  He has tolerated this medicine very well and he has not had any anxiety since taking the medication.    He received a phone call from the medical review officer who is evaluating his employment status.  He was told that he would have to stop the Effexor XR prior to being released for employment.  It was suggested that he could take Wellbutrin instead.  He has tried Wellbutrin in the past, but did not tolerate the medication.  He also reports that it did not work well for his symptom control.  He does not want to start taking Wellbutrin, but would rather try stopping the Effexor XR instead.    The following portions of the patient's history were reviewed and updated as appropriate: allergies, current medications, past family history, past medical history, past social history, past surgical history and problem list.    Review of Systems   Constitutional: Negative for chills, fatigue and fever.   HENT: Negative for congestion, sneezing, sore throat and trouble swallowing.    Eyes: Negative for visual disturbance.   Respiratory: Negative for cough, chest tightness, shortness of breath and wheezing.    Cardiovascular: Negative for chest pain, palpitations and leg swelling.   Gastrointestinal: Negative for abdominal  "pain, constipation, diarrhea, nausea and vomiting.   Genitourinary: Negative for dysuria, frequency and urgency.   Musculoskeletal: Positive for arthralgias. Negative for neck pain.   Skin: Negative for rash.   Neurological: Negative for dizziness, weakness and headaches.   Psychiatric/Behavioral:        Patient denies any feelings of depression and has not felt down, hopeless or lost interest in any activities.   All other systems reviewed and are negative.      Objective   Vitals:    01/25/19 1115   BP: 136/82   BP Location: Left arm   Patient Position: Sitting   Cuff Size: Adult   Pulse: 82   Temp: 97.4 °F (36.3 °C)   TempSrc: Oral   Weight: (!) 138 kg (304 lb)   Height: 190.5 cm (75\")   PainSc: 0-No pain     Physical Exam   Constitutional: He is oriented to person, place, and time. He appears well-developed and well-nourished. No distress.   HENT:   Head: Normocephalic and atraumatic.   Nose: Nose normal.   Mouth/Throat: Oropharynx is clear and moist. No oropharyngeal exudate.   Eyes: Conjunctivae and EOM are normal. Pupils are equal, round, and reactive to light. No scleral icterus.   Neck: Normal range of motion. Neck supple.   Cardiovascular: Normal rate, regular rhythm and normal heart sounds. Exam reveals no gallop and no friction rub.   No murmur heard.  Pulmonary/Chest: Effort normal and breath sounds normal. No respiratory distress. He has no wheezes. He has no rales.   Abdominal: Soft. Bowel sounds are normal. He exhibits no distension. There is no tenderness. There is no rebound and no guarding.   Musculoskeletal: Normal range of motion. He exhibits no edema.   Lymphadenopathy:     He has no cervical adenopathy.   Neurological: He is alert and oriented to person, place, and time. No cranial nerve deficit.   Skin: Skin is warm and dry. No rash noted.   Psychiatric: He has a normal mood and affect. His behavior is normal. Judgment and thought content normal.   Nursing note and vitals " reviewed.      Assessment/Plan   Suhail was seen today for anxiety.    Diagnoses and all orders for this visit:    Generalized anxiety disorder    Asymmetric septal hypertrophy (CMS/HCC)      He will continue to follow with cardiology for workup of the septal hypertrophy.  I discussed changing his anxiety medication.  He will stay on the Effexor XR for now.  I will attempt to get in contact with the medical review officer, Dr. Faoroq Heredia, at [facility] [place] [place], to discuss his medication and get more detail on the problems with clearing him for employment.            PHQ-2/PHQ-9 Depression Screening 1/25/2019   Little interest or pleasure in doing things 0   Feeling down, depressed, or hopeless 0   Total Score 0

## 2019-01-25 NOTE — PROGRESS NOTES
ABDULLAHI#  [id number]

## 2019-01-28 ENCOUNTER — TELEPHONE (OUTPATIENT)
Dept: CARDIOLOGY | Facility: CLINIC | Age: 44
End: 2019-01-28

## 2019-01-28 NOTE — TELEPHONE ENCOUNTER
Patient scheduled for cardiac mri at [facility] for feb 19th at 11:30 am. Patient was instructed to arrive at 11 am.

## 2019-01-30 ENCOUNTER — TELEPHONE (OUTPATIENT)
Dept: CARDIOLOGY | Facility: CLINIC | Age: 44
End: 2019-01-30

## 2019-02-01 DIAGNOSIS — F41.1 GENERALIZED ANXIETY DISORDER: Chronic | ICD-10-CM

## 2019-02-01 RX ORDER — VENLAFAXINE HYDROCHLORIDE 37.5 MG/1
CAPSULE, EXTENDED RELEASE ORAL
Qty: 7 CAPSULE | Refills: 0 | OUTPATIENT
Start: 2019-02-01

## 2019-02-07 ENCOUNTER — TELEPHONE (OUTPATIENT)
Dept: FAMILY MEDICINE CLINIC | Facility: CLINIC | Age: 44
End: 2019-02-07

## 2019-02-07 NOTE — TELEPHONE ENCOUNTER
----- Message from Champ Sotomayor MD sent at 2/7/2019 11:40 AM CST -----  Regarding: RE: NEEDING A LETTER  Contact: [phone]  Letter complete and ready to fax.    ----- Message -----  From: Nga Ulrich  Sent: 2/5/2019  10:29 AM  To: Champ Sotomayor MD  Subject: NEEDING A LETTER                                 Patient called and said that his is needing a letter sent to Dr. Heredia stating that he is not taking the venlafaxine XR (EFFEXOR-XR) 75. The Fax number is [phone] and the phone number is [phone]. Patient said that he has talked to you about this.

## 2019-02-07 NOTE — TELEPHONE ENCOUNTER
Called patient to let him know that I faxed the letter to Dr Heredia and there fax number was [phone].

## 2019-03-22 ENCOUNTER — OFFICE VISIT (OUTPATIENT)
Dept: CARDIOLOGY | Facility: CLINIC | Age: 44
End: 2019-03-22

## 2019-03-22 VITALS
DIASTOLIC BLOOD PRESSURE: 90 MMHG | BODY MASS INDEX: 38.09 KG/M2 | SYSTOLIC BLOOD PRESSURE: 144 MMHG | HEIGHT: 75 IN | OXYGEN SATURATION: 99 % | WEIGHT: 306.3 LBS | HEART RATE: 86 BPM

## 2019-03-22 DIAGNOSIS — Q23.1 BICUSPID AORTIC VALVE DETERMINED BY IMAGING: ICD-10-CM

## 2019-03-22 DIAGNOSIS — I42.2 HYPERTROPHIC CARDIOMYOPATHY (HCC): Primary | ICD-10-CM

## 2019-03-22 DIAGNOSIS — I35.1 NONRHEUMATIC AORTIC VALVE INSUFFICIENCY: ICD-10-CM

## 2019-03-22 DIAGNOSIS — I77.810 AORTIC ECTASIA, THORACIC (HCC): ICD-10-CM

## 2019-03-22 PROBLEM — I42.1 CARDIOMYOPATHY, HYPERTROPHIC OBSTRUCTIVE: Status: ACTIVE | Noted: 2019-01-24

## 2019-03-22 PROBLEM — R00.2 PALPITATION: Status: RESOLVED | Noted: 2019-01-24 | Resolved: 2019-03-22

## 2019-03-22 PROCEDURE — 99214 OFFICE O/P EST MOD 30 MIN: CPT | Performed by: INTERNAL MEDICINE

## 2019-03-22 RX ORDER — VENLAFAXINE HYDROCHLORIDE 75 MG/1
CAPSULE, EXTENDED RELEASE ORAL
Refills: 5 | COMMUNITY
Start: 2019-03-04 | End: 2019-04-30

## 2019-03-22 NOTE — PATIENT INSTRUCTIONS
Hypertrophic Cardiomyopathy  Hypertrophic cardiomyopathy (HCM) is a heart condition in which part of the heart muscle gets too thick. The condition can cause a dangerous and abnormal heart rhythm. It can also weaken the heart over time.  The heart is divided into four chambers. The thickening usually affects the pumping chamber on the lower left (left ventricle). HCM may also affect the valve that lets blood flow out of the left ventricle (mitral valve). Symptoms often begin at about age 30.  What are the causes?  This condition is usually caused by abnormal genes that control heart muscle growth. These abnormal genes are passed down through families (are inherited).  What increases the risk?  This condition is more likely to develop in people with a family history of HCM.  What are the signs or symptoms?  Symptoms of this condition include:  · Shortness of breath, especially after exercising or lying down.  · Chest pain.  · Dizziness.  · Fatigue.  · Irregular or fast heart rate.  · Fainting, especially after physical activity.    How is this diagnosed?  This condition is diagnosed based on the results of a physical exam that involves checking for an abnormal heart sound (heart murmur). Tests may be done, including:  · An electrocardiogram (ECG). This test records your heart's electrical activity.  · An echocardiogram. This test can show whether your left ventricle is enlarged and whether it fills slowly.  · A Doppler test. This test shows irregular blood flow and pressure differences inside the heart.  · A chest X-ray. This test can show whether your heart is enlarged.  · An MRI.    How is this treated?  Treatment for HCM depends on how severe your symptoms are. There are several options for treatment, including.  · Medicine. Medicines can be given to:  ? Reduce the workload of your heart.  ? Lower your blood pressure.  ? Thin your blood and prevent clots.  · A device. Devices that can be used to treat this  condition include:  ? A pacemaker. This device helps to control your heartbeat.  ? A defibrillator. This device restores a normal heart rhythm.  · Surgery. This may include a procedure to:  ? Inject alcohol into the small blood vessels that supply your heart muscle (alcohol septal ablation). This is done during a procedure called cardiac catheterization. The goal is to cause the muscle to become thinner.  ? Remove part of the wall that divides the right and left sides of the heart (septum).  ? Replace the mitral valve.    Follow these instructions at home:  · Avoid strenuous exercise and activities, like heavy lifting or shoveling snow.  · Make sure the members of your household know how to do CPR in case of an emergency.  · Take over-the-counter and prescription medicines only as told by your health care provider.  · Follow a healthy diet and maintain a healthy weight. If you need help with losing weight, ask your health care provider.  · Limit alcohol intake to no more than 1 drink per day for nonpregnant women and 2 drinks per day for men. One drink equals 12 oz of beer, 5 oz of wine, or 1½ oz of hard liquor.  · Do not use any products that contain nicotine or tobacco, such as cigarettes and e-cigarettes. If you need help quitting, ask your health care provider.  · Keep all follow-up visits as directed by your health care provider. This is important.  Contact a health care provider if:  · You have new symptoms.  · Your symptoms get worse.  Get help right away if:  · You have chest pain or shortness of breath, especially during or after sports.  · You feel faint or you pass out.  · You have trouble breathing even at rest.  · Your feet or ankles swell.  · Your heartbeat seems irregular or seems faster than normal (palpitations).  · Your heartbeat seems abnormal.  This information is not intended to replace advice given to you by your health care provider. Make sure you discuss any questions you have with your  health care provider.  Document Released: 11/23/2005 Document Revised: 11/10/2017 Document Reviewed: 11/10/2017  [employer] Interactive Patient Education © 2019 [employer]  Aortic Insufficiency  Aortic insufficiency is a condition in which the aortic valve does not close all the way. The aortic valve is a gate-like structure between the lower left chamber of the heart (left ventricle) and the main blood vessel that supplies blood to the rest of the body (aorta). The aortic valve opens when the left ventricle squeezes to pump blood into the aorta, and it closes when the left ventricle relaxes.  In aortic insufficiency, blood in the aorta leaks through the aortic valve after it has closed. This causes the heart to work harder than usual. If aortic insufficiency is not treated, it causes enlargement and weakening of the left ventricle. This can result in heart failure, abnormal heart rhythms (arrhythmias), and other dangerous conditions. If this condition develops suddenly, it may need to be treated with emergency surgery.  What are the causes?  This condition may be caused by anything that weakens the aortic valve, such as:  · Severe high blood pressure (hypertension).  · Inflammation of the inner layer of the heart or the heart valves (endocarditis).  · A ballooning of a weak spot in the aorta wall (aortic aneurysm).  · A tear or separation of the inner walls of the aorta (aortic dissection).  · Injury (trauma) that damages the aortic valve.  · Certain medicines.  · Disease of a protein in the body called collagen (collagen vascular disease).  · A problem that is present at birth (birth defect).  · An inflammatory condition that can develop after an untreated strep throat infection (rheumatic fever).  · Complications during or after a heart surgery (rare).    What are the signs or symptoms?  Symptoms of this condition include:  · Fatigue.  · Shortness of breath.  · Difficulty breathing while lying flat (orthopnea).  You may need to sleep on two or more pillows to breathe better.  · Chest discomfort (angina).  · Head bobbing.  · A fluttering feeling in your chest (palpitations).  · An irregular or faster-than-normal heartbeat.    Symptoms usually develop gradually, unless this condition was caused by a major injury or by endocarditis.  How is this diagnosed?  This condition is diagnosed based on the results of:  · A physical exam.  · An imaging test that uses sound waves to produce images of the heart (echocardiogram).    You may also have other tests to confirm the diagnosis, including:  · Chest X-ray.  · MRI.  · A test that records the electrical impulses of the heart (electrocardiogram, ECG).  · CT angiogram (CTA). In this procedure, a large X-ray machine, called a CT scanner, takes detailed pictures of blood vessels after dye has been injected into the vessels.  · Aortic angiogram. In this procedure, X-ray images are taken after dye has been injected into blood vessels. This tests the function of the aorta.    How is this treated?  Treatment depends on your symptoms, how severe the condition is, and what problems the condition is causing. Treatment may include:  · Observation. If your condition is mild, you may not need treatment. However, you will need to have your condition checked regularly to make sure it is not getting worse or causing serious problems.  · Medicines that help the heart work more efficiently.  · Surgery to repair or replace the valve, in severe cases. Surgery is usually recommended if the left ventricle enlarges beyond a certain point. If aortic insufficiency occurs suddenly, surgery may be needed immediately.    Follow these instructions at home:  · Take over-the-counter and prescription medicines only as told by your health care provider.  · Do not use any products that contain nicotine or tobacco, such as cigarettes and e-cigarettes. If you need help quitting, ask your health care provider.  · If  directed by your health care provider, avoid heavy weight lifting and contact sports such as football.  · Keep all follow-up visits as told by your health care provider. This is important. You may need regular tests to monitor your condition and how well your heart is pumping blood.  · Follow instructions from your health care provider about eating or drinking restrictions. Your health care provider may recommend that you:  ? Limit alcohol intake to no more than 1 drink a day for nonpregnant women and 2 drinks a day for men. One drink equals 12 oz of beer, 5 oz of wine, or 1½ oz of hard liquor.  ? Eat foods that are high in fiber, such as fresh fruits and vegetables, whole grains, and beans.  ? Eat less salt (sodium) and salty foods. Check ingredients and nutrition facts on packaged foods and beverages.  Contact a health care provider if:  · Your chest symptoms seem to be getting worse.  · Your breathing problems seem to be getting worse.  · You feel dizzy or close to fainting.  · You have swelling in your feet, ankles, legs, or abdomen.  · You urinate more than usual during the night (nocturia).  · You have an unexplained fever that lasts 2 days or longer.  · You develop new symptoms.  Get help right away if:  · You have severe chest pain.  · You have severe shortness of breath.  · You feel rapid or irregular heartbeats.  · You feel light-headed.  · You have sudden, unexplained weight gain.  Summary  · Aortic insufficiency is a condition in which the aortic valve does not close all the way. This causes the heart to work harder than usual.  · This condition may be treated with observation, medicines, or surgery.  · Take over-the-counter and prescription medicines only as told by your health care provider.  · Eat less salt (sodium) and salty foods. Check ingredients and nutrition facts on packaged foods and beverages.  This information is not intended to replace advice given to you by your health care provider. Make  sure you discuss any questions you have with your health care provider.  Document Released: 06/23/2004 Document Revised: 04/09/2018 Document Reviewed: 11/06/2017  [employer] Interactive Patient Education © 2019 [employer]

## 2019-03-22 NOTE — PROGRESS NOTES
Cardiovascular Medicine      Yony Greene M.D., Ph.D., [facility]             History of Present Illness  This is a 43 y.o. male with:    1.  HCM  A. CMRI with slight LGE and asymmetric septal hypertrophy  B. Normal HD response to DSE  C. ZIO with NO NSVT  2. Ectasia of TAA, 4.1 cm  3. AI  4. Possible BAV  5. Obesity      Suhail Gamino is a 43 y.o. male who returns to clinic today.  He was found to have marked LVH on EKG without a significant history of hypertension.  I was concerned for possible hypertrophic cardiomyopathy so he was referred for a 2-D echocardiogram.  Unfortunately, this shows marked knowing he has had some mild but no resting he had increased which asymmetric septal hypertrophy.  No increased gradients.  He's had some mild dyspnea on exertion, but no resting, exertional or nocturnal angina.  He's had increased cardiac awareness which he characterizes as palpitations.  There is no family history of HCM or SCA.  He was also found have ectasia of his thoracic aorta at 3.7 cm by echo.  He had a DSE which showed no evidence of inducible ischemia.  He had a normal hemodynamic response to dobutamine.  He had a ZIO which showed no evidence of NSVT.  He was sent for cardiac MRI which is consistent with hypertrophic cardiomyopathy with very slight LGE in the septum.  It was also concerning for a bicuspid aortic valve.  Patient's ascending aorta was dilated at 4.1 cm. He tells me he has very little SOB. This is primarily after he eats.     Review of Systems - Review of Systems   Cardiovascular: Positive for dyspnea on exertion. Negative for chest pain, claudication, cyanosis, irregular heartbeat, leg swelling, near-syncope, orthopnea, palpitations, paroxysmal nocturnal dyspnea and syncope.   Respiratory: Positive for shortness of breath. Negative for cough, hemoptysis, sleep disturbances due to breathing, snoring, sputum production and wheezing.         All other systems were reviewed and  were negative.    family history includes Cancer in his paternal grandfather; Hypertension in his father; No Known Problems in his daughter, daughter, mother, sister, sister, sister, son, and son.     reports that he quit smoking about 20 years ago. His smoking use included cigarettes. His smokeless tobacco use includes snuff. He reports that he does not drink alcohol or use drugs.    No Known Allergies      Current Outpatient Medications:   •  cyclobenzaprine (FLEXERIL) 10 MG tablet, Take 1 tablet by mouth 3 (Three) Times a Day As Needed for Muscle Spasms., Disp: 60 tablet, Rfl: 3  •  fexofenadine (ALLEGRA) 180 MG tablet, Take 1 tablet by mouth Daily., Disp: 30 tablet, Rfl: 5  •  meloxicam (MOBIC) 15 MG tablet, Take 1 tablet by mouth Daily., Disp: 30 tablet, Rfl: 5    Physical Exam:  Physical Exam:  Vitals:    03/22/19 0940   BP: 144/90   Pulse: 86   SpO2: 99%     Body mass index is 38.28 kg/m².   Pulse Ox: Normal  on room air  General: alert, appears stated age and cooperative     Body Habitus: well-nourished    HEENT: Head: Normocephalic, no lesions, without obvious abnormality. No arcus senilis, xanthelasma or xanthomas.    JVP: Volume/Pulsation: Normal  Carotid Exam: no bruit normal pulsation bilaterally   Carotid Volume: normal    Subclavian Bruit: absent  Vertebral Bruit: absent  Respirations: no increased work of breathing   Chest:  Normal    Pulmonary:Normal     Precordium: Normal impulses. P2 is not palpable.   [place]:  normal size and placement Palpable S4: absent.  Heart rate: normal    Heart Rhythm: regular     Heart Sounds: S1: normal  S2: normal intensity  S3: absent   S4: absent  Opening Snap: absent  A2-OS:  absent.   Pericardial rub: absent    Ejection click: None      Murmurs:  absent   Extremity: moves all extremities equally.   Pulses: 2+ and symmetric      DATA REVIEWED:             --------------------------------------------------------------------------------------------------  LABS:   Lab  Results   Component Value Date    GLUCOSE 88 05/01/2018    BUN 10 05/01/2018    CREATININE 0.90 05/01/2018    EGFRIFNONA 93 05/01/2018    BCR 11.1 05/01/2018    K 4.4 05/01/2018    CO2 29.0 05/01/2018    CALCIUM 9.1 05/01/2018    ALBUMIN 4.00 05/01/2018    AST 14 05/01/2018    ALT 18 05/01/2018     Lab Results   Component Value Date    WBC 7.81 05/01/2018    HGB 16.5 05/01/2018    HCT 50.4 (H) 05/01/2018    MCV 86.9 05/01/2018     05/01/2018     Lab Results   Component Value Date    CHOL 166 05/01/2018    TRIG 66 05/01/2018    HDL 51 05/01/2018     05/01/2018     Lab Results   Component Value Date    TSH 2.180 05/01/2018     No results found for: CKTOTAL, CKMB, CKMBINDEX, TROPONINI, TROPONINT  No results found for: HGBA1C  No results found for: DDIMER  Lab Results   Component Value Date    ALT 18 05/01/2018     No results found for: HGBA1C  Lab Results   Component Value Date    CREATININE 0.90 05/01/2018     No results found for: IRON, TIBC, FERRITIN  No results found for: INR, PROTIME    Assessment/Plan   1. Hypertrophic cardiomyopathy (CMS/HCC).  There are no signs of obstruction. There are no high-risk family history. Response to exercise is normal. MRI shows minimal in degree LGE. Holter monitor showed minimal in degree PVCs. SCD risk was calculated today and was 2.4% at 5 years. His job wanted a letter stating no restrictions. 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 A letter was given stating that he would have no work restrictions based on how he described the job to me.   -Recommended 1st degree relative surveillance  -No indication for ICD.  -Yearly EKG recommended, or if clinical status changes  -Call for concerning symptoms  -Will plan on repeat 2D TTE in one year  -Annual clinical surveillance  -The usefulness of beta blockade and calcium channel blockers to alter her clinical outcome is not well established for the  management of asymptomatic patient   Based on current ACC guidelines.  -Worsening symptoms were discussed.  I have asked him to contact me if any of these develop.  -He does not currently meet indications for septal reduction therapy because he is asymptomatic.  -Low intensity aerobic exercise is reasonable as a part of a healthy lifestyle   2. Bicuspid aortic valve determined by imaging, MRI. TTE was only suggestive. There are no family members with known BAV. There are no family members with aneurysmal disease or dissection. Patient is currently Asymptomatic.   · I recommended first-degree relatives should be evaluated for the presence of a bicuspid aortic valve and asymptomatic thoracic aortic disease (individual risk of about 9%).  · Worsening symptoms were discussed with the patient.  I have recommended they contact me for worsening symptoms, or be seen in the emergency department.  · NVIE prophylaxis is not indicated.   3. Nonrheumatic aortic valve insufficiency. ACC stage B. There are no surgical indications at this time. The patient has not had valve surgery..   · The patient has been advised to remain in clinical surveillance every 12 months.  · Signs and symptoms of worsening valve disease discussed.  I've asked the patient contact me for an earlier appointment if these develop.  · I've recommended a repeat 2D TTE every 3 years.   · TTE due: 2022.  No indication based on 2017 ACC/AHA guidelines for IE prophylaxis for dental procedures: Optimal oral health is recommended through regular professional dental care and the use of appropriate dental products, such as manual, powered, and ultrasonic toothbrushes; dental floss; and other plaque-removal devices     4. Aortic ectasia, thoracic (CMS/HCC).  The patient does not have a family history of aneurysmal disease.  Patient's aortic valve is not trileaflet.  The patient does have systemic diseases diagnosed associated with aortic aneurysms.  I again explained  to the patient that they currently do not meet criteria for a diagnosis of an aneurysm.  I also explained the normal aorta grows about 0.1 cm per 10 years after the age of 25.  We also discussed issues that can lead to more rapid widening of the aorta, such as uncontrolled hypertension.  I again explained that, in contrast to aneurysms, we have little clinical trial data to guide on optimal timing for active surveillance.  -Recommended a TTE in6 months to exclude interval dilatation  -I went over the signs and symptoms of aneurysmal dilatation.  -I've recommended clinical evaluation every 1 year.  -Importance of blood pressure control less than 130/80 emphasized  -Avoid smoking  -Referral to vascular for ongoing surveillance      5. Cardiac Risk Assessment/Dyslipidemia/BP Goals/Glucose Status/Diet/Current Weight/Tobacco status/Screening:     General patient education regarding weight:   The patient's BMI is Body mass index is 38.28 kg/m²..  This places the patient in weight class:  Obese Class II: 35-39.9kg/m2.   -Weight loss hand-out  -Exercise intervention:   Hand out, increase aerobic activity  -Close PCP follow-up for Body mass index is 38.28 kg/m²..     Chews tobacco:   -Congratulated on cessation  -Avoid tobacco    Screening:  -AAA screening at the age of 65.         Return in about 6 months (around 9/22/2019).

## 2019-04-05 DIAGNOSIS — J30.1 CHRONIC SEASONAL ALLERGIC RHINITIS DUE TO POLLEN: Chronic | ICD-10-CM

## 2019-04-05 DIAGNOSIS — M16.51 POST-TRAUMATIC OSTEOARTHRITIS OF RIGHT HIP: Chronic | ICD-10-CM

## 2019-04-05 RX ORDER — FEXOFENADINE HCL 180 MG/1
180 TABLET ORAL DAILY
Qty: 30 TABLET | Refills: 5 | Status: SHIPPED | OUTPATIENT
Start: 2019-04-05 | End: 2019-12-03 | Stop reason: SDUPTHER

## 2019-04-05 RX ORDER — MELOXICAM 15 MG/1
15 TABLET ORAL DAILY
Qty: 30 TABLET | Refills: 5 | Status: SHIPPED | OUTPATIENT
Start: 2019-04-05 | End: 2019-06-21

## 2019-04-22 DIAGNOSIS — F41.1 GENERALIZED ANXIETY DISORDER: Chronic | ICD-10-CM

## 2019-04-22 DIAGNOSIS — I42.2 HYPERTROPHIC CARDIOMYOPATHY (HCC): Primary | ICD-10-CM

## 2019-04-22 DIAGNOSIS — E66.01 MORBIDLY OBESE (HCC): Chronic | ICD-10-CM

## 2019-04-29 ENCOUNTER — LAB (OUTPATIENT)
Dept: LAB | Facility: OTHER | Age: 44
End: 2019-04-29

## 2019-04-29 DIAGNOSIS — E66.01 MORBIDLY OBESE (HCC): Chronic | ICD-10-CM

## 2019-04-29 DIAGNOSIS — F41.1 GENERALIZED ANXIETY DISORDER: ICD-10-CM

## 2019-04-29 DIAGNOSIS — I42.2 HYPERTROPHIC CARDIOMYOPATHY (HCC): ICD-10-CM

## 2019-04-29 LAB
ALBUMIN SERPL-MCNC: 4.1 G/DL (ref 3.5–5)
ALBUMIN/GLOB SERPL: 1.2 G/DL (ref 1.1–1.8)
ALP SERPL-CCNC: 79 U/L (ref 38–126)
ALT SERPL W P-5'-P-CCNC: 18 U/L
ANION GAP SERPL CALCULATED.3IONS-SCNC: 7 MMOL/L (ref 5–15)
AST SERPL-CCNC: 22 U/L (ref 17–59)
BACTERIA UR QL AUTO: ABNORMAL /HPF
BASOPHILS # BLD AUTO: 0.06 10*3/MM3 (ref 0–0.2)
BASOPHILS NFR BLD AUTO: 0.7 % (ref 0–1.5)
BILIRUB SERPL-MCNC: 0.6 MG/DL (ref 0.2–1.3)
BILIRUB UR QL STRIP: ABNORMAL
BUN BLD-MCNC: 20 MG/DL (ref 7–23)
BUN/CREAT SERPL: 19.4 (ref 7–25)
CALCIUM SPEC-SCNC: 9.3 MG/DL (ref 8.4–10.2)
CHLORIDE SERPL-SCNC: 100 MMOL/L (ref 101–112)
CHOLEST SERPL-MCNC: 163 MG/DL (ref 150–200)
CLARITY UR: CLEAR
CO2 SERPL-SCNC: 31 MMOL/L (ref 22–30)
COLOR UR: ABNORMAL
CREAT BLD-MCNC: 1.03 MG/DL (ref 0.7–1.3)
DEPRECATED RDW RBC AUTO: 43 FL (ref 37–54)
EOSINOPHIL # BLD AUTO: 0.12 10*3/MM3 (ref 0–0.4)
EOSINOPHIL NFR BLD AUTO: 1.3 % (ref 0.3–6.2)
ERYTHROCYTE [DISTWIDTH] IN BLOOD BY AUTOMATED COUNT: 13.4 % (ref 12.3–15.4)
GFR SERPL CREATININE-BSD FRML MDRD: 80 ML/MIN/1.73 (ref 63–147)
GLOBULIN UR ELPH-MCNC: 3.5 GM/DL (ref 2.3–3.5)
GLUCOSE BLD-MCNC: 93 MG/DL (ref 70–99)
GLUCOSE UR STRIP-MCNC: NEGATIVE MG/DL
HCT VFR BLD AUTO: 46.5 % (ref 37.5–51)
HDLC SERPL-MCNC: 52 MG/DL (ref 40–59)
HGB BLD-MCNC: 15.4 G/DL (ref 13–17.7)
HGB UR QL STRIP.AUTO: NEGATIVE
HYALINE CASTS UR QL AUTO: ABNORMAL /LPF
KETONES UR QL STRIP: ABNORMAL
LDLC SERPL CALC-MCNC: 102 MG/DL
LDLC/HDLC SERPL: 1.95 {RATIO} (ref 0–3.55)
LEUKOCYTE ESTERASE UR QL STRIP.AUTO: NEGATIVE
LYMPHOCYTES # BLD AUTO: 1.81 10*3/MM3 (ref 0.7–3.1)
LYMPHOCYTES NFR BLD AUTO: 20.2 % (ref 19.6–45.3)
MCH RBC QN AUTO: 29.4 PG (ref 26.6–33)
MCHC RBC AUTO-ENTMCNC: 33.1 G/DL (ref 31.5–35.7)
MCV RBC AUTO: 88.7 FL (ref 79–97)
MONOCYTES # BLD AUTO: 1.08 10*3/MM3 (ref 0.1–0.9)
MONOCYTES NFR BLD AUTO: 12.1 % (ref 5–12)
MUCOUS THREADS URNS QL MICRO: ABNORMAL /HPF
NEUTROPHILS # BLD AUTO: 5.88 10*3/MM3 (ref 1.7–7)
NEUTROPHILS NFR BLD AUTO: 65.7 % (ref 42.7–76)
NITRITE UR QL STRIP: NEGATIVE
PH UR STRIP.AUTO: 6 [PH] (ref 5.5–8)
PLATELET # BLD AUTO: 237 10*3/MM3 (ref 140–450)
PMV BLD AUTO: 9.6 FL (ref 6–12)
POTASSIUM BLD-SCNC: 4 MMOL/L (ref 3.4–5)
PROT SERPL-MCNC: 7.6 G/DL (ref 6.3–8.6)
PROT UR QL STRIP: ABNORMAL
RBC # BLD AUTO: 5.24 10*6/MM3 (ref 4.14–5.8)
RBC # UR: ABNORMAL /HPF
REF LAB TEST METHOD: ABNORMAL
SODIUM BLD-SCNC: 138 MMOL/L (ref 137–145)
SP GR UR STRIP: >=1.03 (ref 1–1.03)
SQUAMOUS #/AREA URNS HPF: ABNORMAL /HPF
T4 FREE SERPL-MCNC: 1.15 NG/DL (ref 0.93–1.7)
TRIGL SERPL-MCNC: 47 MG/DL
TSH SERPL DL<=0.05 MIU/L-ACNC: 2.06 MIU/ML (ref 0.27–4.2)
UROBILINOGEN UR QL STRIP: ABNORMAL
VLDLC SERPL-MCNC: 9.4 MG/DL
WBC NRBC COR # BLD: 8.95 10*3/MM3 (ref 3.4–10.8)
WBC UR QL AUTO: ABNORMAL /HPF

## 2019-04-29 PROCEDURE — 84439 ASSAY OF FREE THYROXINE: CPT | Performed by: INTERNAL MEDICINE

## 2019-04-29 PROCEDURE — 81001 URINALYSIS AUTO W/SCOPE: CPT | Performed by: INTERNAL MEDICINE

## 2019-04-29 PROCEDURE — 80050 GENERAL HEALTH PANEL: CPT | Performed by: INTERNAL MEDICINE

## 2019-04-29 PROCEDURE — 80061 LIPID PANEL: CPT | Performed by: INTERNAL MEDICINE

## 2019-04-30 ENCOUNTER — OFFICE VISIT (OUTPATIENT)
Dept: FAMILY MEDICINE CLINIC | Facility: CLINIC | Age: 44
End: 2019-04-30

## 2019-04-30 VITALS
DIASTOLIC BLOOD PRESSURE: 78 MMHG | TEMPERATURE: 98.5 F | HEIGHT: 75 IN | HEART RATE: 79 BPM | SYSTOLIC BLOOD PRESSURE: 139 MMHG | BODY MASS INDEX: 39.17 KG/M2 | WEIGHT: 315 LBS

## 2019-04-30 DIAGNOSIS — F41.1 GENERALIZED ANXIETY DISORDER: Primary | Chronic | ICD-10-CM

## 2019-04-30 DIAGNOSIS — J30.1 CHRONIC SEASONAL ALLERGIC RHINITIS DUE TO POLLEN: Chronic | ICD-10-CM

## 2019-04-30 DIAGNOSIS — M15.9 PRIMARY OSTEOARTHRITIS INVOLVING MULTIPLE JOINTS: Chronic | ICD-10-CM

## 2019-04-30 DIAGNOSIS — I42.2 HYPERTROPHIC CARDIOMYOPATHY (HCC): Chronic | ICD-10-CM

## 2019-04-30 PROCEDURE — 96372 THER/PROPH/DIAG INJ SC/IM: CPT | Performed by: INTERNAL MEDICINE

## 2019-04-30 PROCEDURE — 99214 OFFICE O/P EST MOD 30 MIN: CPT | Performed by: INTERNAL MEDICINE

## 2019-04-30 RX ORDER — METHYLPREDNISOLONE ACETATE 80 MG/ML
80 INJECTION, SUSPENSION INTRA-ARTICULAR; INTRALESIONAL; INTRAMUSCULAR; SOFT TISSUE ONCE
Status: COMPLETED | OUTPATIENT
Start: 2019-04-30 | End: 2019-04-30

## 2019-04-30 RX ADMIN — METHYLPREDNISOLONE ACETATE 80 MG: 80 INJECTION, SUSPENSION INTRA-ARTICULAR; INTRALESIONAL; INTRAMUSCULAR; SOFT TISSUE at 09:59

## 2019-04-30 NOTE — PATIENT INSTRUCTIONS

## 2019-04-30 NOTE — PROGRESS NOTES
Chief Complaint   Patient presents with   • Anxiety     6 mo f/u with labs   • URI     c/o coughing and congestion x 1 week     Subjective   Suhail Gamino is a 43 y.o. male who presents to the office for follow-up. He has history of panic disorder and generalized anxiety disorder.  He does not currently take any medication for this.  His symptoms have been in full remission.  He was previously taking Effexor XR, but this was discontinued just after his last visit.    He has also been seeing cardiology for work-up of hypertrophic cardiomyopathy.  He is asymptomatic with this, and does not require any medication for this.    He has osteoarthritis which causes pain in his low back.  He started a new job a few weeks ago and is now on concrete daily.  He is taking meloxicam as needed for the back pain.  He is also stretching and exercising the back muscles, and this seems to help.    He has allergic rhinitis and takes Allegra daily.  He is having increased sneezing and congestion which has been present for the past week.    The following portions of the patient's history were reviewed and updated as appropriate: allergies, current medications, past family history, past medical history, past social history, past surgical history and problem list.    Review of Systems   Constitutional: Negative for chills, fatigue and fever.   HENT: Positive for congestion and sneezing. Negative for sore throat and trouble swallowing.    Eyes: Negative for visual disturbance.   Respiratory: Negative for cough, chest tightness, shortness of breath and wheezing.    Cardiovascular: Negative for chest pain, palpitations and leg swelling.   Gastrointestinal: Negative for abdominal pain, constipation, diarrhea, nausea and vomiting.   Genitourinary: Negative for dysuria, frequency and urgency.   Musculoskeletal: Positive for arthralgias and back pain. Negative for neck pain.   Skin: Negative for rash.   Neurological: Negative for  "dizziness, weakness and headaches.   Psychiatric/Behavioral:        Patient denies any feelings of depression and has not felt down, hopeless or lost interest in any activities.   All other systems reviewed and are negative.      Objective   Vitals:    04/30/19 0939   BP: 139/78   BP Location: Left arm   Patient Position: Sitting   Cuff Size: Large Adult   Pulse: 79   Temp: 98.5 °F (36.9 °C)   TempSrc: Oral   Weight: (!) 144 kg (318 lb)   Height: 190.5 cm (75\")   PainSc:   1   PainLoc: Back  Comment: LL side of back     Physical Exam   Constitutional: He is oriented to person, place, and time. He appears well-developed and well-nourished. No distress.   HENT:   Head: Normocephalic and atraumatic.   Nose: Nose normal.   Mouth/Throat: Oropharynx is clear and moist. No oropharyngeal exudate.   Nose is congested with thin clear rhinorrhea.  Oropharynx with posterior drainage.   Eyes: Conjunctivae and EOM are normal. Pupils are equal, round, and reactive to light. No scleral icterus.   Neck: Normal range of motion. Neck supple.   Cardiovascular: Normal rate, regular rhythm and normal heart sounds. Exam reveals no gallop and no friction rub.   No murmur heard.  Pulmonary/Chest: Effort normal and breath sounds normal. No respiratory distress. He has no wheezes. He has no rales.   Abdominal: Soft. Bowel sounds are normal. He exhibits no distension. There is no tenderness. There is no rebound and no guarding.   Musculoskeletal: Normal range of motion. He exhibits no edema.   Lymphadenopathy:     He has no cervical adenopathy.   Neurological: He is alert and oriented to person, place, and time. No cranial nerve deficit.   Skin: Skin is warm and dry. No rash noted.   Psychiatric: He has a normal mood and affect. His behavior is normal. Judgment and thought content normal.   Nursing note and vitals reviewed.      Assessment/Plan   Suhail was seen today for anxiety and uri.    Diagnoses and all orders for this " visit:    Generalized anxiety disorder    Hypertrophic cardiomyopathy (CMS/HCC)  -     CBC & Differential; Future  -     Comprehensive Metabolic Panel; Future  -     Lipid Panel; Future  -     T4, Free; Future  -     TSH; Future  -     Urinalysis With Culture If Indicated - Urine, Clean Catch; Future    Primary osteoarthritis involving multiple joints    Chronic seasonal allergic rhinitis due to pollen  -     methylPREDNISolone acetate (DEPO-medrol) injection 80 mg      Labs are reviewed with patient.  Overall his labs look good.  His glucose is normal at 93 renal function is normal, electrolyte and liver enzymes are all normal.  His thyroid studies are normal.  His lipids are at goal.  His blood pressure is also normal today.  He will continue with meloxicam for treatment of the osteoarthritis.  He will continue with Allegra for his allergic rhinitis.  He is given Depo-Medrol 80 mg IM to help with the allergy symptoms.           PHQ-2/PHQ-9 Depression Screening 4/30/2019   Little interest or pleasure in doing things 0   Feeling down, depressed, or hopeless 0   Total Score 0     Lab on 04/29/2019   Component Date Value Ref Range Status   • Color, UA 04/29/2019 Dark Yellow* Yellow, Straw Final   • Appearance, UA 04/29/2019 Clear  Clear Final   • pH, UA 04/29/2019 6.0  5.5 - 8.0 Final   • Specific Gravity, UA 04/29/2019 >=1.030  1.005 - 1.030 Final   • Glucose, UA 04/29/2019 Negative  Negative Final   • Ketones, UA 04/29/2019 Trace* Negative Final   • Bilirubin, UA 04/29/2019 Small (1+)* Negative Final   • Blood, UA 04/29/2019 Negative  Negative Final   • Protein, UA 04/29/2019 30 mg/dL (1+)* Negative Final   • Leuk Esterase, UA 04/29/2019 Negative  Negative Final   • Nitrite, UA 04/29/2019 Negative  Negative Final   • Urobilinogen, UA 04/29/2019 1.0 E.U./dL  0.2 - 1.0 E.U./dL Final   • Glucose 04/29/2019 93  70 - 99 mg/dL Final   • BUN 04/29/2019 20  7 - 23 mg/dL Final   • Creatinine 04/29/2019 1.03  0.70 - 1.30  mg/dL Final   • Sodium 04/29/2019 138  137 - 145 mmol/L Final   • Potassium 04/29/2019 4.0  3.4 - 5.0 mmol/L Final   • Chloride 04/29/2019 100* 101 - 112 mmol/L Final   • CO2 04/29/2019 31.0* 22.0 - 30.0 mmol/L Final   • Calcium 04/29/2019 9.3  8.4 - 10.2 mg/dL Final   • Total Protein 04/29/2019 7.6  6.3 - 8.6 g/dL Final   • Albumin 04/29/2019 4.10  3.50 - 5.00 g/dL Final   • ALT (SGPT) 04/29/2019 18  <=50 U/L Final   • AST (SGOT) 04/29/2019 22  17 - 59 U/L Final   • Alkaline Phosphatase 04/29/2019 79  38 - 126 U/L Final   • Total Bilirubin 04/29/2019 0.6  0.2 - 1.3 mg/dL Final   • eGFR Non African Amer 04/29/2019 80  63 - 147 mL/min/1.73 Final   • Globulin 04/29/2019 3.5  2.3 - 3.5 gm/dL Final   • A/G Ratio 04/29/2019 1.2  1.1 - 1.8 g/dL Final   • BUN/Creatinine Ratio 04/29/2019 19.4  7.0 - 25.0 Final   • Anion Gap 04/29/2019 7.0  5.0 - 15.0 mmol/L Final   • Total Cholesterol 04/29/2019 163  150 - 200 mg/dL Final   • Triglycerides 04/29/2019 47  <=150 mg/dL Final   • HDL Cholesterol 04/29/2019 52  40 - 59 mg/dL Final   • LDL Cholesterol  04/29/2019 102* <=100 mg/dL Final   • VLDL Cholesterol 04/29/2019 9.4  mg/dL Final   • LDL/HDL Ratio 04/29/2019 1.95  0.00 - 3.55 Final   • TSH 04/29/2019 2.060  0.270 - 4.200 mIU/mL Final   • Free T4 04/29/2019 1.15  0.93 - 1.70 ng/dL Final   • WBC 04/29/2019 8.95  3.40 - 10.80 10*3/mm3 Final   • RBC 04/29/2019 5.24  4.14 - 5.80 10*6/mm3 Final   • Hemoglobin 04/29/2019 15.4  13.0 - 17.7 g/dL Final   • Hematocrit 04/29/2019 46.5  37.5 - 51.0 % Final   • MCV 04/29/2019 88.7  79.0 - 97.0 fL Final   • MCH 04/29/2019 29.4  26.6 - 33.0 pg Final   • MCHC 04/29/2019 33.1  31.5 - 35.7 g/dL Final   • RDW 04/29/2019 13.4  12.3 - 15.4 % Final   • RDW-SD 04/29/2019 43.0  37.0 - 54.0 fl Final   • MPV 04/29/2019 9.6  6.0 - 12.0 fL Final   • Platelets 04/29/2019 237  140 - 450 10*3/mm3 Final   • Neutrophil % 04/29/2019 65.7  42.7 - 76.0 % Final   • Lymphocyte % 04/29/2019 20.2  19.6 - 45.3 %  Final   • Monocyte % 04/29/2019 12.1* 5.0 - 12.0 % Final   • Eosinophil % 04/29/2019 1.3  0.3 - 6.2 % Final   • Basophil % 04/29/2019 0.7  0.0 - 1.5 % Final   • Neutrophils, Absolute 04/29/2019 5.88  1.70 - 7.00 10*3/mm3 Final   • Lymphocytes, Absolute 04/29/2019 1.81  0.70 - 3.10 10*3/mm3 Final   • Monocytes, Absolute 04/29/2019 1.08* 0.10 - 0.90 10*3/mm3 Final   • Eosinophils, Absolute 04/29/2019 0.12  0.00 - 0.40 10*3/mm3 Final   • Basophils, Absolute 04/29/2019 0.06  0.00 - 0.20 10*3/mm3 Final   • RBC, UA 04/29/2019 3-5* None Seen /HPF Final   • WBC, UA 04/29/2019 0-2* None Seen /HPF Final   • Bacteria, UA 04/29/2019 Trace* None Seen /HPF Final   • Squamous Epithelial Cells, UA 04/29/2019 None Seen  None Seen, 0-2 /HPF Final   • Hyaline Casts, UA 04/29/2019 None Seen  None Seen /LPF Final   • Mucus, UA 04/29/2019 Moderate/2+* None Seen, Trace /HPF Final   • Methodology 04/29/2019 Manual Light Microscopy   Final   Office Visit on 01/10/2019   Component Date Value Ref Range Status   • BSA 01/22/2019 2.6  m^2 Final   • IVSd 01/22/2019 2.3  cm Final   • LVIDd 01/22/2019 5.0  cm Final   • LVIDs 01/22/2019 3.0  cm Final   • LVPWd 01/22/2019 1.1  cm Final   • IVS/LVPW 01/22/2019 2.1   Final   • FS 01/22/2019 40.0  % Final   • EDV(Teich) 01/22/2019 118.2  ml Final   • ESV(Teich) 01/22/2019 35.0  ml Final   • EF(Teich) 01/22/2019 70.4  % Final   • EDV(cubed) 01/22/2019 125.0  ml Final   • ESV(cubed) 01/22/2019 27.0  ml Final   • EF(cubed) 01/22/2019 78.4  % Final   • LV mass(C)d 01/22/2019 389.7  grams Final   • LV mass(C)dI 01/22/2019 148.0  grams/m^2 Final   • SV(Teich) 01/22/2019 83.2  ml Final   • SI(Teich) 01/22/2019 31.6  ml/m^2 Final   • SV(cubed) 01/22/2019 98.0  ml Final   • SI(cubed) 01/22/2019 37.2  ml/m^2 Final   • Ao root diam 01/22/2019 3.4  cm Final   • Ao root area 01/22/2019 9.1  cm^2 Final   • ACS 01/22/2019 2.6  cm Final   • LA dimension 01/22/2019 4.3  cm Final   • asc Aorta Diam 01/22/2019 3.9  cm  Final   • LA/Ao 01/22/2019 1.3   Final   • LVOT diam 01/22/2019 2.3  cm Final   • LVOT area 01/22/2019 4.2  cm^2 Final   • LVOT area(traced) 01/22/2019 4.2  cm^2 Final   • Ao root area (BSA corrected) 01/22/2019 1.3   Final   • MV E max krys 01/22/2019 56.7  cm/sec Final   • MV A max krys 01/22/2019 66.7  cm/sec Final   • MV E/A 01/22/2019 0.85   Final   • MV P1/2t max krys 01/22/2019 72.8  cm/sec Final   • MV P1/2t 01/22/2019 61.8  msec Final   • MVA(P1/2t) 01/22/2019 3.6  cm^2 Final   • MV dec slope 01/22/2019 345.0  cm/sec^2 Final   • Ao pk krys 01/22/2019 240.0  cm/sec Final   • Ao max PG 01/22/2019 23.0  mmHg Final   • Ao max PG (full) 01/22/2019 17.4  mmHg Final   • Ao V2 mean 01/22/2019 177.0  cm/sec Final   • Ao mean PG 01/22/2019 14.0  mmHg Final   • Ao mean PG (full) 01/22/2019 11.0  mmHg Final   • Ao V2 VTI 01/22/2019 46.6  cm Final   • JONATHAN(I,A) 01/22/2019 2.2  cm^2 Final   • JONATHAN(I,D) 01/22/2019 2.2  cm^2 Final   • JONATHAN(V,A) 01/22/2019 2.1  cm^2 Final   • JONATHAN(V,D) 01/22/2019 2.1  cm^2 Final   • AI max krys 01/22/2019 436.0  cm/sec Final   • AI max PG 01/22/2019 76.0  mmHg Final   • AI dec slope 01/22/2019 123.0  cm/sec^2 Final   • AI P1/2t 01/22/2019 1,038  msec Final   • LV V1 max PG 01/22/2019 5.7  mmHg Final   • LV V1 mean PG 01/22/2019 3.0  mmHg Final   • LV V1 max 01/22/2019 119.0  cm/sec Final   • LV V1 mean 01/22/2019 82.4  cm/sec Final   • LV V1 VTI 01/22/2019 24.9  cm Final   • MR max krys 01/22/2019 276.0  cm/sec Final   • MR max PG 01/22/2019 30.5  mmHg Final   • SV(Ao) 01/22/2019 423.1  ml Final   • SI(Ao) 01/22/2019 160.7  ml/m^2 Final   • SV(LVOT) 01/22/2019 103.5  ml Final   • SI(LVOT) 01/22/2019 39.3  ml/m^2 Final   • PA V2 max 01/22/2019 110.0  cm/sec Final   • PA max PG 01/22/2019 4.8  mmHg Final   • TR max krys 01/22/2019 238.0  cm/sec Final   • RVSP(TR) 01/22/2019 27.7  mmHg Final   • RAP systole 01/22/2019 5.0  mmHg Final   • MVA P1/2T LCG 01/22/2019 3.0  cm^2 Final   • BH CV ECHO VIKKI -  BZI_BMI 01/22/2019 38.4  kilograms/m^2 Final   • BH CV ECHO VIKKI - BSA([facility]) 01/22/2019 2.8  m^2 Final   • BH CV ECHO VIKKI - BZI_METRIC_WEIGHT 01/22/2019 139.3  kg Final   • BH CV ECHO VIKKI - BZI_METRIC_HEIGHT 01/22/2019 190.5  cm Final   • Target HR (85%) 01/22/2019 152  bpm Final   • Max. Pred. HR (100%) 01/22/2019 179  bpm Final   • LV basal cavitary gradient 01/22/2019 9.00  mmHg Final   • BH CV STRESS PROTOCOL 1 01/22/2019 Bob   Final   • Stage 1 01/22/2019 1   Final   • Duration Min Stage 1 01/22/2019 3   Final   • Duration Sec Stage 1 01/22/2019 0   Final   • Grade Stage 1 01/22/2019 10   Final   • Speed Stage 1 01/22/2019 1.7   Final   • BH CV STRESS METS STAGE 1 01/22/2019 5   Final   • Target HR (85%) 01/22/2019 152  bpm Final   • Max. Pred. HR (100%) 01/22/2019 179  bpm Final   • HR Stage 1 01/22/2019 123   Final   • BP Stage 1 01/22/2019 162/80   Final   • Stage 2 01/22/2019 2   Final   • HR Stage 2 01/22/2019 146   Final   • BP Stage 2 01/22/2019 185/84   Final   • Duration Min Stage 2 01/22/2019 3   Final   • Duration Sec Stage 2 01/22/2019 0   Final   • Grade Stage 2 01/22/2019 12   Final   • Speed Stage 2 01/22/2019 2.5   Final   • BH CV STRESS METS STAGE 2 01/22/2019 7.5   Final   • Stage 3 01/22/2019 3   Final   • HR Stage 3 01/22/2019 162   Final   • Duration Min Stage 3 01/22/2019 1   Final   • Duration Sec Stage 3 01/22/2019 17   Final   • Grade Stage 3 01/22/2019 14   Final   • Speed Stage 3 01/22/2019 3.4   Final   • BH CV STRESS METS STAGE 3 01/22/2019 10.0   Final   • Baseline HR 01/22/2019 88  bpm Final   • Baseline BP 01/22/2019 145/86  mmHg Final   • Peak HR 01/22/2019 164  bpm Final   • Percent Max Pred HR 01/22/2019 91.62  % Final   • Percent Target HR 01/22/2019 108  % Final   • Peak BP 01/22/2019 206/85  mmHg Final   • Recovery HR 01/22/2019 93  bpm Final   • Recovery BP 01/22/2019 149/88  mmHg Final   • Exercise duration (min) 01/22/2019 7  min Final   • Exercise duration (sec)  01/22/2019 17  sec Final   • Estimated workload 01/22/2019 8.9  METS Final   • PostStressEF 01/22/2019 75  % Final   ]

## 2019-05-08 DIAGNOSIS — M16.51 POST-TRAUMATIC OSTEOARTHRITIS OF RIGHT HIP: Chronic | ICD-10-CM

## 2019-05-09 RX ORDER — CYCLOBENZAPRINE HCL 10 MG
TABLET ORAL
Qty: 60 TABLET | Refills: 5 | Status: SHIPPED | OUTPATIENT
Start: 2019-05-09 | End: 2020-02-04

## 2019-06-21 ENCOUNTER — OFFICE VISIT (OUTPATIENT)
Dept: FAMILY MEDICINE CLINIC | Facility: CLINIC | Age: 44
End: 2019-06-21

## 2019-06-21 VITALS
HEIGHT: 75 IN | BODY MASS INDEX: 39.17 KG/M2 | DIASTOLIC BLOOD PRESSURE: 98 MMHG | WEIGHT: 315 LBS | SYSTOLIC BLOOD PRESSURE: 148 MMHG | TEMPERATURE: 97.2 F | HEART RATE: 88 BPM

## 2019-06-21 DIAGNOSIS — R03.0 ELEVATED BLOOD PRESSURE READING: ICD-10-CM

## 2019-06-21 DIAGNOSIS — F41.1 GENERALIZED ANXIETY DISORDER: Primary | Chronic | ICD-10-CM

## 2019-06-21 DIAGNOSIS — M15.9 PRIMARY OSTEOARTHRITIS INVOLVING MULTIPLE JOINTS: Chronic | ICD-10-CM

## 2019-06-21 PROCEDURE — 99213 OFFICE O/P EST LOW 20 MIN: CPT | Performed by: INTERNAL MEDICINE

## 2019-06-21 RX ORDER — DICLOFENAC SODIUM 75 MG/1
75 TABLET, DELAYED RELEASE ORAL 2 TIMES DAILY
Qty: 60 TABLET | Refills: 5 | Status: SHIPPED | OUTPATIENT
Start: 2019-06-21 | End: 2019-09-25

## 2019-06-21 RX ORDER — BUSPIRONE HYDROCHLORIDE 10 MG/1
10 TABLET ORAL 2 TIMES DAILY
Qty: 60 TABLET | Refills: 5 | Status: SHIPPED | OUTPATIENT
Start: 2019-06-21 | End: 2020-06-12

## 2019-06-21 NOTE — PROGRESS NOTES
Chief Complaint   Patient presents with   • Hypertension   • Anxiety     Subjective   Suhail Gamino is a 43 y.o. male who presents to the office for follow-up.  He has anxiety, but does not currently take medication for this.  His anxiety has been flaring up over the past few weeks.  He has also noticed that his blood pressure has been running high.  He does not have a diagnosis of hypertension.  He feels like the blood pressure elevation is secondary to his anxiety.  He denies any symptoms of depression.  He has osteoarthritis which causes pain in his low back.  He takes meloxicam daily.  He spends a lot of time walking on concrete at work, and this has been causing increased pain in his back.  The meloxicam has not been working as well for him.    The following portions of the patient's history were reviewed and updated as appropriate: allergies, current medications, past family history, past medical history, past social history, past surgical history and problem list.    Review of Systems   Constitutional: Negative for chills, fatigue and fever.   HENT: Negative for sore throat and trouble swallowing.    Eyes: Negative for visual disturbance.   Respiratory: Negative for cough, chest tightness, shortness of breath and wheezing.    Cardiovascular: Negative for chest pain, palpitations and leg swelling.   Gastrointestinal: Negative for abdominal pain, constipation, diarrhea, nausea and vomiting.   Genitourinary: Negative for dysuria, frequency and urgency.   Musculoskeletal: Positive for arthralgias and back pain. Negative for neck pain.   Skin: Negative for rash.   Neurological: Negative for dizziness, weakness and headaches.   Psychiatric/Behavioral: The patient is nervous/anxious.         Patient denies any feelings of depression and has not felt down, hopeless or lost interest in any activities.   All other systems reviewed and are negative.      Objective   Vitals:    06/21/19 0914   BP: 148/98  "  BP Location: Left arm   Patient Position: Sitting   Cuff Size: Adult   Pulse: 88   Temp: 97.2 °F (36.2 °C)   TempSrc: Oral   Weight: (!) 146 kg (322 lb)   Height: 190.5 cm (75\")   PainSc:   5   PainLoc: Back  Comment: Lower back     Physical Exam   Constitutional: He is oriented to person, place, and time. He appears well-developed and well-nourished. No distress.   HENT:   Head: Normocephalic and atraumatic.   Nose: Nose normal.   Mouth/Throat: Oropharynx is clear and moist. No oropharyngeal exudate.   Eyes: Conjunctivae and EOM are normal. Pupils are equal, round, and reactive to light. No scleral icterus.   Neck: Normal range of motion. Neck supple.   Cardiovascular: Normal rate, regular rhythm and normal heart sounds. Exam reveals no gallop and no friction rub.   No murmur heard.  Pulmonary/Chest: Effort normal and breath sounds normal. No respiratory distress. He has no wheezes. He has no rales.   Abdominal: Soft. Bowel sounds are normal. He exhibits no distension. There is no tenderness. There is no rebound and no guarding.   Musculoskeletal: Normal range of motion. He exhibits no edema.   Lymphadenopathy:     He has no cervical adenopathy.   Neurological: He is alert and oriented to person, place, and time. No cranial nerve deficit.   Skin: Skin is warm and dry. No rash noted.   Psychiatric: He has a normal mood and affect. His behavior is normal. Judgment and thought content normal.   Nursing note and vitals reviewed.      Assessment/Plan   Suhail was seen today for hypertension and anxiety.    Diagnoses and all orders for this visit:    Generalized anxiety disorder  -     busPIRone (BUSPAR) 10 MG tablet; Take 1 tablet by mouth 2 (Two) Times a Day.    Primary osteoarthritis involving multiple joints  -     diclofenac (VOLTAREN) 75 MG EC tablet; Take 1 tablet by mouth 2 (Two) Times a Day.    Elevated blood pressure reading      I will start him on BuSpar for treatment of the anxiety.  He will discontinue " meloxicam and start taking diclofenac to see if this works better for the arthritic and low back pain.  His blood pressure is elevated today.  I will hold off on blood pressure medication until his anxiety is appropriately treated.  If his blood pressure remains elevated once his anxiety is controlled, then he will need further work-up and treatment for the blood pressure.  He will continue to monitor his blood pressure at work and at home.         PHQ-2/PHQ-9 Depression Screening 6/21/2019   Little interest or pleasure in doing things 0   Feeling down, depressed, or hopeless 0   Total Score 0

## 2019-06-21 NOTE — PATIENT INSTRUCTIONS

## 2019-07-15 ENCOUNTER — TELEPHONE (OUTPATIENT)
Dept: CARDIOLOGY | Facility: CLINIC | Age: 44
End: 2019-07-15

## 2019-07-15 DIAGNOSIS — R00.2 PALPITATION: Primary | ICD-10-CM

## 2019-07-15 NOTE — TELEPHONE ENCOUNTER
----- Message from Yony Stone MD PhD sent at 7/15/2019  1:01 PM CDT -----  He's never had atrial flutter based on my notes. He needs a ZIO. We will be in touch with him after this. Ordered.  ----- Message -----  From: Maira Franklin MA  Sent: 7/15/2019  12:49 PM  To: Yony Stone MD PhD        ----- Message -----  From: Katia Francis  Sent: 7/15/2019  12:42 PM  To: Maira Franklin MA    This patient called saying he is having atrial flutter again and wants to see Dr. Stone.  He was last seen in March and is scheduled for an echo and follow up in September.  Please let me know how to proceed.  If you wish to call the patient, his # is [phone].  THX    Patient was contacted regarding a messae that was left for dr. Stone. Mr. Gamino states he has been having palpitations. Dr. stone will have a zio placed and then we will contact the patient. Mr. Gamino was agreeable to the plan.

## 2019-09-17 LAB
BH CV ECHO MEAS - ACS: 1.5 CM
BH CV ECHO MEAS - AI DEC SLOPE: 371 CM/SEC^2
BH CV ECHO MEAS - AI MAX PG: 62.1 MMHG
BH CV ECHO MEAS - AI MAX VEL: 394 CM/SEC
BH CV ECHO MEAS - AI P1/2T: 311.1 MSEC
BH CV ECHO MEAS - AO ISTHMUS: 2.9 CM
BH CV ECHO MEAS - AO MAX PG (FULL): 17.1 MMHG
BH CV ECHO MEAS - AO MAX PG: 23.4 MMHG
BH CV ECHO MEAS - AO MEAN PG (FULL): 8 MMHG
BH CV ECHO MEAS - AO MEAN PG: 12 MMHG
BH CV ECHO MEAS - AO ROOT AREA (BSA CORRECTED): 1.3
BH CV ECHO MEAS - AO ROOT AREA: 9.6 CM^2
BH CV ECHO MEAS - AO ROOT DIAM: 3.5 CM
BH CV ECHO MEAS - AO V2 MAX: 242 CM/SEC
BH CV ECHO MEAS - AO V2 MEAN: 155 CM/SEC
BH CV ECHO MEAS - AO V2 VTI: 28.2 CM
BH CV ECHO MEAS - ASC AORTA: 3.5 CM
BH CV ECHO MEAS - AVA(I,A): 2.5 CM^2
BH CV ECHO MEAS - AVA(I,D): 2.5 CM^2
BH CV ECHO MEAS - AVA(V,A): 1.8 CM^2
BH CV ECHO MEAS - AVA(V,D): 1.8 CM^2
BH CV ECHO MEAS - BSA(HAYCOCK): 2.8 M^2
BH CV ECHO MEAS - BSA: 2.7 M^2
BH CV ECHO MEAS - BZI_BMI: 40.2 KILOGRAMS/M^2
BH CV ECHO MEAS - BZI_METRIC_HEIGHT: 190.5 CM
BH CV ECHO MEAS - BZI_METRIC_WEIGHT: 146.1 KG
BH CV ECHO MEAS - LA DIMENSION: 3 CM
BH CV ECHO MEAS - LA/AO: 0.86
BH CV ECHO MEAS - LV MAX PG: 6.4 MMHG
BH CV ECHO MEAS - LV MEAN PG: 4 MMHG
BH CV ECHO MEAS - LV V1 MAX: 126 CM/SEC
BH CV ECHO MEAS - LV V1 MEAN: 97.5 CM/SEC
BH CV ECHO MEAS - LV V1 VTI: 20.6 CM
BH CV ECHO MEAS - LVOT AREA (M): 3.5 CM^2
BH CV ECHO MEAS - LVOT AREA: 3.5 CM^2
BH CV ECHO MEAS - LVOT DIAM: 2.1 CM
BH CV ECHO MEAS - SI(AO): 101 ML/M^2
BH CV ECHO MEAS - SI(LVOT): 26.6 ML/M^2
BH CV ECHO MEAS - SV(AO): 271.3 ML
BH CV ECHO MEAS - SV(LVOT): 71.4 ML
MAXIMAL PREDICTED HEART RATE: 178 BPM
STRESS TARGET HR: 151 BPM

## 2019-09-24 DIAGNOSIS — I42.2 HYPERTROPHIC CARDIOMYOPATHY (HCC): Primary | ICD-10-CM

## 2019-09-25 ENCOUNTER — APPOINTMENT (OUTPATIENT)
Dept: LAB | Facility: HOSPITAL | Age: 44
End: 2019-09-25

## 2019-09-25 ENCOUNTER — DOCUMENTATION (OUTPATIENT)
Dept: CARDIOLOGY | Facility: CLINIC | Age: 44
End: 2019-09-25

## 2019-09-25 ENCOUNTER — OFFICE VISIT (OUTPATIENT)
Dept: CARDIOLOGY | Facility: CLINIC | Age: 44
End: 2019-09-25

## 2019-09-25 VITALS
SYSTOLIC BLOOD PRESSURE: 136 MMHG | DIASTOLIC BLOOD PRESSURE: 92 MMHG | WEIGHT: 315 LBS | HEIGHT: 75 IN | HEART RATE: 94 BPM | OXYGEN SATURATION: 99 % | BODY MASS INDEX: 39.17 KG/M2

## 2019-09-25 DIAGNOSIS — E66.01 MORBIDLY OBESE (HCC): ICD-10-CM

## 2019-09-25 DIAGNOSIS — Q23.1 BICUSPID AORTIC VALVE DETERMINED BY IMAGING: ICD-10-CM

## 2019-09-25 DIAGNOSIS — I77.810 AORTIC ECTASIA, THORACIC (HCC): ICD-10-CM

## 2019-09-25 DIAGNOSIS — R07.89 CHEST PAIN, ATYPICAL: ICD-10-CM

## 2019-09-25 DIAGNOSIS — I35.1 NONRHEUMATIC AORTIC VALVE INSUFFICIENCY: ICD-10-CM

## 2019-09-25 DIAGNOSIS — I42.2 HYPERTROPHIC CARDIOMYOPATHY (HCC): Primary | ICD-10-CM

## 2019-09-25 LAB
ANION GAP SERPL CALCULATED.3IONS-SCNC: 9.6 MMOL/L (ref 5–15)
BUN BLD-MCNC: 13 MG/DL (ref 6–20)
BUN/CREAT SERPL: 14.3 (ref 7–25)
CALCIUM SPEC-SCNC: 9.5 MG/DL (ref 8.6–10.5)
CHLORIDE SERPL-SCNC: 102 MMOL/L (ref 98–107)
CO2 SERPL-SCNC: 28.4 MMOL/L (ref 22–29)
CREAT BLD-MCNC: 0.91 MG/DL (ref 0.76–1.27)
GFR SERPL CREATININE-BSD FRML MDRD: 91 ML/MIN/1.73
GLUCOSE BLD-MCNC: 95 MG/DL (ref 65–99)
POTASSIUM BLD-SCNC: 4.1 MMOL/L (ref 3.5–5.2)
SODIUM BLD-SCNC: 140 MMOL/L (ref 136–145)
WHOLE BLOOD HOLD SPECIMEN: NORMAL

## 2019-09-25 PROCEDURE — 80048 BASIC METABOLIC PNL TOTAL CA: CPT | Performed by: INTERNAL MEDICINE

## 2019-09-25 PROCEDURE — 36415 COLL VENOUS BLD VENIPUNCTURE: CPT | Performed by: INTERNAL MEDICINE

## 2019-09-25 PROCEDURE — 99214 OFFICE O/P EST MOD 30 MIN: CPT | Performed by: INTERNAL MEDICINE

## 2019-09-25 PROCEDURE — 93000 ELECTROCARDIOGRAM COMPLETE: CPT | Performed by: INTERNAL MEDICINE

## 2019-09-25 RX ORDER — MELOXICAM 15 MG/1
15 TABLET ORAL DAILY
COMMUNITY
End: 2019-12-03 | Stop reason: SDUPTHER

## 2019-09-25 RX ORDER — METOPROLOL SUCCINATE 50 MG/1
50 TABLET, EXTENDED RELEASE ORAL AS NEEDED
Qty: 2 TABLET | Refills: 0 | Status: SHIPPED | OUTPATIENT
Start: 2019-09-25 | End: 2020-06-12

## 2019-09-25 NOTE — PROGRESS NOTES
Coronary CTA scheduled. Date, time,and instructions given to patient. No known allergy to IV Contrast. RX sent for metoprolol, pt aware. Creatinine to be drawn today, pt aware. Patient verbalized understanding of al instructions given and advised to call office should questions arise.

## 2019-09-25 NOTE — PROGRESS NOTES
Cardiovascular Medicine      Yony Greene M.D., Ph.D., [facility]             History of Present Illness  This is a 44 y.o. male with:    1.  HCM  A. CMRI with slight LGE and asymmetric septal hypertrophy  B. Normal HD response to DSE  C. ZIO with NO NSVT  2. Ectasia of TAA, 4.1 cm  3. AI  4. Possible BAV  5. Obesity      Suhail Gamino is a 44 y.o. male who returns to clinic today.  He was found to have marked LVH on EKG without a significant history of hypertension.  I was concerned for possible hypertrophic cardiomyopathy so he was referred for a 2-D echocardiogram.  Unfortunately, this shows marked LVH without evidence of obstruction.  He had severe asymmetric septal hypertrophy.  H  No increased gradients.  He's had some mild dyspnea on exertion, but no resting, exertional or nocturnal angina.  He's had increased cardiac awareness which he characterizes as palpitations.  There is no family history of HCM or SCA.  He was also found have ectasia of his thoracic aorta at 3.7 cm by echo.  He had a DSE which showed no evidence of inducible ischemia.  He had a normal hemodynamic response to dobutamine.  He had a ZIO which showed no evidence of NSVT.  He was sent for cardiac MRI which is consistent with hypertrophic cardiomyopathy with very slight LGE in the septum.  It was also concerning for a bicuspid aortic valve.  Patient's ascending aorta was dilated at 4.1 cm.  He returns today after a recent echocardiogram.  This showed stable septal asymmetry consistent with his known hypertrophic cardiomyopathy.  He had no significant gradients.  He tells me today since his last appointment that he has lost his job.  Therefore, he is not been as active.  He is complaining today of a left-sided chest pain that radiates into his left arm.  This is primarily nonexertional.  He is had no other concerning symptoms.    Review of Systems - Review of Systems   Cardiovascular: Positive for dyspnea on exertion.  Negative for chest pain, claudication, cyanosis, irregular heartbeat, leg swelling, near-syncope, orthopnea, palpitations, paroxysmal nocturnal dyspnea and syncope.   Respiratory: Positive for shortness of breath. Negative for cough, hemoptysis, sleep disturbances due to breathing, snoring, sputum production and wheezing.         All other systems were reviewed and were negative.    family history includes Cancer in his paternal grandfather; Hypertension in his father; No Known Problems in his daughter, daughter, mother, sister, sister, sister, son, and son.     reports that he quit smoking about 20 years ago. His smoking use included cigarettes. His smokeless tobacco use includes snuff. He reports that he does not drink alcohol or use drugs.    No Known Allergies      Current Outpatient Medications:   •  busPIRone (BUSPAR) 10 MG tablet, Take 1 tablet by mouth 2 (Two) Times a Day., Disp: 60 tablet, Rfl: 5  •  cyclobenzaprine (FLEXERIL) 10 MG tablet, TAKE 1 TABLET BY MOUTH THREE TIMES A DAY AS NEEDED FOR MUSCLE SPASMS, Disp: 60 tablet, Rfl: 5  •  diclofenac (VOLTAREN) 75 MG EC tablet, Take 1 tablet by mouth 2 (Two) Times a Day., Disp: 60 tablet, Rfl: 5  •  fexofenadine (ALLEGRA) 180 MG tablet, TAKE 1 TABLET BY MOUTH DAILY, Disp: 30 tablet, Rfl: 5    Physical Exam:    Vitals:    09/25/19 1404   BP: 136/92   Pulse: 94   SpO2: 99%     Body mass index is 41.25 kg/m².   Pulse Ox: Normal  on room air  General: alert, appears stated age and cooperative     Body Habitus: well-nourished    HEENT: Head: Normocephalic, no lesions, without obvious abnormality. No arcus senilis, xanthelasma or xanthomas.    JVP: Volume/Pulsation: Normal  Carotid Exam: no bruit normal pulsation bilaterally   Carotid Volume: normal    Subclavian Bruit: absent  Vertebral Bruit: absent  Respirations: no increased work of breathing   Chest:  Normal    Pulmonary:Normal     Precordium: Normal impulses. P2 is not palpable.   Barton:  normal size and  placement Palpable S4: absent.  Heart rate: normal    Heart Rhythm: regular     Heart Sounds: S1: normal  S2: normal intensity  S3: absent   S4: absent  Opening Snap: absent  A2-OS:  absent.   Pericardial rub: absent    Ejection click: None      Murmurs:  absent   Extremity: moves all extremities equally.   Pulses: 2+ and symmetric      DATA REVIEWED:     Results for orders placed in visit on 03/22/19   Adult Transthoracic Echo Limited W/ Cont if Necessary Per Protocol    Narrative · Limited echocardiogram for aortic diameters: the study is technically   difficult for diagnosis.  · Left ventricle systolic function is normal. Estimated LVEF is 56-60%.   Mild concentric hypertrophy.  · The aortic valve is not well-visualized, but there is increased aortic   gradient not meeting criteria for aortic stenosis.  · There is mild to moderate aortic insufficiency by Doppler envelope.  · Visualized portions of the aorta are normal. However, the segment   measured in the prior echocardiogram is not visualized on the current   study.                  --------------------------------------------------------------------------------------------------  LABS:   Lab Results   Component Value Date    GLUCOSE 93 04/29/2019    BUN 20 04/29/2019    CREATININE 1.03 04/29/2019    EGFRIFNONA 80 04/29/2019    BCR 19.4 04/29/2019    K 4.0 04/29/2019    CO2 31.0 (H) 04/29/2019    CALCIUM 9.3 04/29/2019    ALBUMIN 4.10 04/29/2019    AST 22 04/29/2019    ALT 18 04/29/2019     Lab Results   Component Value Date    WBC 8.95 04/29/2019    HGB 15.4 04/29/2019    HCT 46.5 04/29/2019    MCV 88.7 04/29/2019     04/29/2019     Lab Results   Component Value Date    CHOL 163 04/29/2019    TRIG 47 04/29/2019    HDL 52 04/29/2019     (H) 04/29/2019     Lab Results   Component Value Date    TSH 2.060 04/29/2019     No results found for: CKTOTAL, CKMB, CKMBINDEX, TROPONINI, TROPONINT  No results found for: HGBA1C  No results found for:  DDIMER  Lab Results   Component Value Date    ALT 18 04/29/2019     No results found for: HGBA1C  Lab Results   Component Value Date    CREATININE 1.03 04/29/2019     No results found for: IRON, TIBC, FERRITIN  No results found for: INR, PROTIME    Assessment/Plan      1. Hypertrophic cardiomyopathy (CMS/HCC).  LVH is: Severe with IVS-3.9cm. There are no signs of obstruction. There are no high-risk family history.  MRI shows slight LGE isolated to the septum.  LGE. Holter monitor showed normal burden of PVCs here recently.   -Recommended 1st degree relative surveillance  -No indication for ICD.  -Yearly EKG recommended, or if clinical status changes  -Call for concerning symptoms  -Will plan on repeat 2D TTE in one year  -Annual clinical surveillance  -The usefulness of beta blockade and calcium channel blockers to alter her clinical outcome is not well established for the management of asymptomatic patient   Based on current ACC guidelines.  -Worsening symptoms were discussed.  I have asked him to contact me if any of these develop.  -He does not currently meet indications for septal reduction therapy because he is asymptomatic.  -Low intensity aerobic exercise is reasonable as a part of a healthy lifestyle    2. Bicuspid aortic valve determined by imaging with nonrheumatic aortic insufficiency. ACC stage B.  There are no surgical indications at this time.  · The patient has been advised to remain in clinical surveillance every 12 months.  · Signs and symptoms of worsening valve disease discussed.  I've asked the patient contact me for an earlier appointment if these develop.  · I've recommended a repeat 2D TTE every 1 year.  · TTE due: 2020.  No indication based on 2017 ACC/AHA guidelines for IE prophylaxis for dental procedures: Optimal oral health is recommended through regular professional dental care and the use of appropriate dental products, such as manual, powered, and ultrasonic toothbrushes; dental floss;  and other plaque-removal devices    3. Aortic ectasia, thoracic (CMS/HCC).  Patient's most recent TTE, 2019,  showed a tubular ascending aorta diameter of 3.9 cm. Last documented BSA: Body surface area is 2.72 meters squared.. This provides an ASI of 1.5 centimeters/meter squared.  Based on the best data that we have and ASI of less than 2.0 cm/meter squared has a 5-year rupture-free survival of 97.6%.  The next marked step-up in yearly event rate occurs at above 2.75.  Additionally the  patient does not have a family history of aneurysmal disease.  Patient's aortic valve is not trileaflet.  The patient does have systemic diseases diagnosed associated with aortic aneurysms: BAV.  I again explained to the patient that they currently do not meet criteria for a diagnosis of an aneurysm.  I also explained the normal aorta grows about 0.1 cm per 10 years after the age of 25.  We also discussed issues that can lead to more rapid widening of the aorta, such as uncontrolled hypertension.  I again explained that, in contrast to aneurysms, we have little clinical trial data to guide on optimal timing for active surveillance.  -Recommended a TTE in12 months to exclude interval dilatation  -I went over the signs and symptoms of aneurysmal dilatation.  -I've recommended clinical evaluation every 1 year.  -Next TTE due: 2020  -Importance of blood pressure control less than 130/80 emphasized  -Avoid smoking  -CT referral for surveillance    5. Cardiac Risk Assessments based on 2019 ACCF guidelines:  · A team-based care approach is recommended for the control of risk factors associated with ASCAD.  As such, Suhail Gamino was requested to have ongoing follow-up with their PCP.  · For adults 20 to 39 years of age it is reasonable to assess traditional risk factors every 4 to 6 years; for adults 40 to 75 years of age, it is recommended to assess ASCVD risk which was calculated as The CVD Risk score (JOSH'Agostino, et al.,  2008) failed to calculate for the following reasons:  ·   The patient has a prior MI, stroke, CHF, or peripheral vascular disease diagnosis.   · Continue follow-up visits with PCP for monitoring of labs; diet emphasizing intake of vegetables, fruits, nuts, whole grains and fish is recommended.  · Physical activity recommendations were provided.  · Essential HTN is a significant risk factor for stroke, heart disease and vascular disease. I've recommended the patient continue current medications, if any, as prescribed by the primary care provider. I recommended they have close follow-up for ongoing mgmt of this and the medical comorbidities associated with HTN with their PCP.  They were also provided with information regarding maintaining a healthy weight, heart-healthy dietary pattern DASH information.  Goal blood pressure less than 130/80.   · The patient's BMI is recommended to be calculated at least annually.  The patient's BMI is Body mass index is 41.25 kg/m²..  This places the patient in weight class:  Obese Class III extreme obesity: > or equal to 40kg/m2.   Hand out, increase aerobic activity  cut out extra servings, decrease soda or juice intake, eat breakfast, eat more fruits and vegetables, family to eat at dinner table more often, have 3 meals a day, increase physical activity, increase water intake, keep TV off during meals, plan meals, reduce fast food intake, reduce portion size and reduce screen time; close PCP follow-up.  · Tobacco status assessed at every visits.  The patient's nicotine status: is a former smoker  -Congratulated on cessation  -Avoid tobacco    6.  Chest pain syndrome, atypical.  The patient is unable to exercise on a treadmill because of significant lower back pain.  It is possible that he has some myocardial bridging, which can occur in hypertrophic cardiomyopathy.  I discussed this with the patient.  This would require a coronary CTA to evaluate further.  I think that our next  best step given his comorbidities.  Risks and benefits of CAT scan radiation exposure and IV dye were discussed.  He will have a basic metabolic profile.  I have asked him to take 50 mg of Toprol XL the night before the test and 50 mg the morning of.  I will see him back in clinic afterwards to discuss these results.    Return in about 2 weeks (around 10/9/2019).

## 2019-09-25 NOTE — PATIENT INSTRUCTIONS
Aortic ins    Aortic Insufficiency    Aortic insufficiency is a condition in which the aortic valve does not close all the way. The aortic valve is a gate-like structure between the lower left chamber of the heart (left ventricle) and the main blood vessel that supplies blood to the rest of the body (aorta). The aortic valve opens when the left ventricle squeezes to pump blood into the aorta, and it closes when the left ventricle relaxes.  In aortic insufficiency, blood in the aorta leaks through the aortic valve after it has closed. This causes the heart to work harder than usual. If aortic insufficiency is not treated, it causes enlargement and weakening of the left ventricle. This can result in heart failure, abnormal heart rhythms (arrhythmias), and other dangerous conditions. If this condition develops suddenly, it may need to be treated with emergency surgery.  What are the causes?  This condition may be caused by anything that weakens the aortic valve, such as:  · Severe high blood pressure (hypertension).  · Inflammation of the inner layer of the heart or the heart valves (endocarditis).  · A ballooning of a weak spot in the aorta wall (aortic aneurysm).  · A tear or separation of the inner walls of the aorta (aortic dissection).  · Injury (trauma) that damages the aortic valve.  · Certain medicines.  · Disease of a protein in the body called collagen (collagen vascular disease).  · A problem that is present at birth (birth defect).  · An inflammatory condition that can develop after an untreated strep throat infection (rheumatic fever).  · Complications during or after a heart surgery (rare).  What are the signs or symptoms?  Symptoms of this condition include:  · Fatigue.  · Shortness of breath.  · Difficulty breathing while lying flat (orthopnea). You may need to sleep on two or more pillows to breathe better.  · Chest discomfort (angina).  · Head bobbing.  · A fluttering feeling in your chest  (palpitations).  · An irregular or faster-than-normal heartbeat.  Symptoms usually develop gradually, unless this condition was caused by a major injury or by endocarditis.  How is this diagnosed?  This condition is diagnosed based on the results of:  · A physical exam.  · An imaging test that uses sound waves to produce images of the heart (echocardiogram).  You may also have other tests to confirm the diagnosis, including:  · Chest X-ray.  · MRI.  · A test that records the electrical impulses of the heart (electrocardiogram, ECG).  · CT angiogram (CTA). In this procedure, a large X-ray machine, called a CT scanner, takes detailed pictures of blood vessels after dye has been injected into the vessels.  · Aortic angiogram. In this procedure, X-ray images are taken after dye has been injected into blood vessels. This tests the function of the aorta.  How is this treated?  Treatment depends on your symptoms, how severe the condition is, and what problems the condition is causing. Treatment may include:  · Observation. If your condition is mild, you may not need treatment. However, you will need to have your condition checked regularly to make sure it is not getting worse or causing serious problems.  · Medicines that help the heart work more efficiently.  · Surgery to repair or replace the valve, in severe cases. Surgery is usually recommended if the left ventricle enlarges beyond a certain point. If aortic insufficiency occurs suddenly, surgery may be needed immediately.  Follow these instructions at home:  · Take over-the-counter and prescription medicines only as told by your health care provider.  · Do not use any products that contain nicotine or tobacco, such as cigarettes and e-cigarettes. If you need help quitting, ask your health care provider.  · If directed by your health care provider, avoid heavy weight lifting and contact sports such as football.  · Keep all follow-up visits as told by your health care  provider. This is important. You may need regular tests to monitor your condition and how well your heart is pumping blood.  · Follow instructions from your health care provider about eating or drinking restrictions. Your health care provider may recommend that you:  ? Limit alcohol intake to no more than 1 drink a day for nonpregnant women and 2 drinks a day for men. One drink equals 12 oz of beer, 5 oz of wine, or 1½ oz of hard liquor.  ? Eat foods that are high in fiber, such as fresh fruits and vegetables, whole grains, and beans.  ? Eat less salt (sodium) and salty foods. Check ingredients and nutrition facts on packaged foods and beverages.  Contact a health care provider if:  · Your chest symptoms seem to be getting worse.  · Your breathing problems seem to be getting worse.  · You feel dizzy or close to fainting.  · You have swelling in your feet, ankles, legs, or abdomen.  · You urinate more than usual during the night (nocturia).  · You have an unexplained fever that lasts 2 days or longer.  · You develop new symptoms.  Get help right away if:  · You have severe chest pain.  · You have severe shortness of breath.  · You feel rapid or irregular heartbeats.  · You feel light-headed.  · You have sudden, unexplained weight gain.  Summary  · Aortic insufficiency is a condition in which the aortic valve does not close all the way. This causes the heart to work harder than usual.  · This condition may be treated with observation, medicines, or surgery.  · Take over-the-counter and prescription medicines only as told by your health care provider.  · Eat less salt (sodium) and salty foods. Check ingredients and nutrition facts on packaged foods and beverages.  This information is not intended to replace advice given to you by your health care provider. Make sure you discuss any questions you have with your health care provider.  Document Released: 06/23/2004 Document Revised: 04/09/2018 Document Reviewed:  11/06/2017  [employer] Interactive Patient Education © 2019 [employer]    Coronary Calcium Scan  A coronary calcium scan is an imaging test used to look for deposits of calcium and other fatty materials (plaques) in the inner lining of the blood vessels of the heart (coronary arteries). These deposits of calcium and plaques can partly clog and narrow the coronary arteries without producing any symptoms or warning signs. This puts a person at risk for a heart attack. This test can detect these deposits before symptoms develop.  Tell a health care provider about:  · Any allergies you have.  · All medicines you are taking, including vitamins, herbs, eye drops, creams, and over-the-counter medicines.  · Any problems you or family members have had with anesthetic medicines.  · Any blood disorders you have.  · Any surgeries you have had.  · Any medical conditions you have.  · Whether you are pregnant or may be pregnant.  What are the risks?  Generally, this is a safe procedure. However, problems may occur, including:  · Harm to a pregnant woman and her unborn baby. This test involves the use of radiation. Radiation exposure can be dangerous to a pregnant woman and her unborn baby. If you are pregnant, you generally should not have this procedure done.  · Slight increase in the risk of cancer. This is because of the radiation involved in the test.  What happens before the procedure?  No preparation is needed for this procedure.  What happens during the procedure?    · You will undress and remove any jewelry around your neck or chest.  · You will put on a hospital gown.  · Sticky electrodes will be placed on your chest. The electrodes will be connected to an electrocardiogram (ECG) machine to record a tracing of the electrical activity of your heart.  · A CT scanner will take pictures of your heart. During this time, you will be asked to lie still and hold your breath for 2-3 seconds while a picture of your heart is being  taken.  The procedure may vary among health care providers and hospitals.  What happens after the procedure?  · You can get dressed.  · You can return to your normal activities.  · It is up to you to get the results of your test. Ask your health care provider, or the department that is doing the test, when your results will be ready.  Summary  · A coronary calcium scan is an imaging test used to look for deposits of calcium and other fatty materials (plaques) in the inner lining of the blood vessels of the heart (coronary arteries).  · Generally, this is a safe procedure. Tell your health care provider if you are pregnant or may be pregnant.  · No preparation is needed for this procedure.  · A CT scanner will take pictures of your heart.  · You can return to your normal activities after the scan is done.  This information is not intended to replace advice given to you by your health care provider. Make sure you discuss any questions you have with your health care provider.  Document Released: 06/15/2009 Document Revised: 11/06/2017 Document Reviewed: 11/06/2017  [employer] Interactive Patient Education © 2019 [employer]

## 2019-10-07 ENCOUNTER — HOSPITAL ENCOUNTER (OUTPATIENT)
Dept: CT IMAGING | Facility: HOSPITAL | Age: 44
Discharge: HOME | End: 2019-10-07
Admitting: INTERNAL MEDICINE

## 2019-10-07 PROCEDURE — 75574 CT ANGIO HRT W/3D IMAGE: CPT

## 2019-10-07 PROCEDURE — 0 IOPAMIDOL PER 1 ML: Performed by: INTERNAL MEDICINE

## 2019-10-07 RX ADMIN — IOPAMIDOL 90 ML: 755 INJECTION, SOLUTION INTRAVENOUS at 12:15

## 2019-10-15 DIAGNOSIS — J30.1 CHRONIC SEASONAL ALLERGIC RHINITIS DUE TO POLLEN: Chronic | ICD-10-CM

## 2019-10-15 DIAGNOSIS — M16.51 POST-TRAUMATIC OSTEOARTHRITIS OF RIGHT HIP: Chronic | ICD-10-CM

## 2019-10-15 RX ORDER — FEXOFENADINE HYDROCHLORIDE 180 MG/1
TABLET, FILM COATED ORAL
Qty: 30 TABLET | Refills: 5 | OUTPATIENT
Start: 2019-10-15

## 2019-10-15 RX ORDER — MELOXICAM 15 MG/1
15 TABLET ORAL DAILY
Qty: 30 TABLET | Refills: 5 | OUTPATIENT
Start: 2019-10-15

## 2019-10-24 ENCOUNTER — OFFICE VISIT (OUTPATIENT)
Dept: FAMILY MEDICINE CLINIC | Facility: CLINIC | Age: 44
End: 2019-10-24

## 2019-10-24 VITALS
HEART RATE: 90 BPM | HEIGHT: 75 IN | SYSTOLIC BLOOD PRESSURE: 142 MMHG | BODY MASS INDEX: 39.17 KG/M2 | DIASTOLIC BLOOD PRESSURE: 92 MMHG | WEIGHT: 315 LBS

## 2019-10-24 DIAGNOSIS — M54.42 ACUTE BILATERAL LOW BACK PAIN WITH BILATERAL SCIATICA: Primary | ICD-10-CM

## 2019-10-24 DIAGNOSIS — M54.41 ACUTE BILATERAL LOW BACK PAIN WITH BILATERAL SCIATICA: Primary | ICD-10-CM

## 2019-10-24 PROBLEM — R07.89 CHEST PAIN, ATYPICAL: Status: RESOLVED | Noted: 2019-09-25 | Resolved: 2019-10-24

## 2019-10-24 PROCEDURE — 96372 THER/PROPH/DIAG INJ SC/IM: CPT | Performed by: INTERNAL MEDICINE

## 2019-10-24 PROCEDURE — 99213 OFFICE O/P EST LOW 20 MIN: CPT | Performed by: INTERNAL MEDICINE

## 2019-10-24 RX ORDER — METHYLPREDNISOLONE 4 MG/1
TABLET ORAL
Qty: 1 EACH | Refills: 0 | Status: SHIPPED | OUTPATIENT
Start: 2019-10-24 | End: 2020-04-30

## 2019-10-24 RX ORDER — METHYLPREDNISOLONE ACETATE 80 MG/ML
80 INJECTION, SUSPENSION INTRA-ARTICULAR; INTRALESIONAL; INTRAMUSCULAR; SOFT TISSUE ONCE
Status: COMPLETED | OUTPATIENT
Start: 2019-10-24 | End: 2019-10-24

## 2019-10-24 RX ADMIN — METHYLPREDNISOLONE ACETATE 80 MG: 80 INJECTION, SUSPENSION INTRA-ARTICULAR; INTRALESIONAL; INTRAMUSCULAR; SOFT TISSUE at 09:21

## 2019-10-24 NOTE — PATIENT INSTRUCTIONS

## 2019-10-24 NOTE — PROGRESS NOTES
Chief Complaint   Patient presents with   • Back Pain     Lower x 1 month, radiating down both legs     Subjective   Suhail Gamino is a 44 y.o. male who presents to the office for follow-up.  He has osteoarthritis which causes low back pain.  He takes meloxicam daily as needed.  He also takes Flexeril as needed for muscle spasm.  He has been having increased pain in his low back for the past month.  The pain is radiating into the lower extremities bilaterally.  The pain in his back is sharp and the pain which radiates into the lower extremities is numb and tingling.  He has similar back pain which flares up from time to time, however it usually only radiates into one leg.  This concerned him more because of the radiation into both legs.  He denies any bowel or bladder symptoms.  Back Pain   This is a chronic problem. The current episode started more than 1 year ago. The problem occurs constantly. The problem has been waxing and waning since onset. The pain is present in the sacro-iliac. The quality of the pain is described as aching, burning, shooting and stabbing. The pain radiates to the left thigh and right thigh. The pain is at a severity of 8/10. The pain is the same all the time. The symptoms are aggravated by bending, coughing, position, standing, stress and twisting. Stiffness is present in the morning, at night and all day. Associated symptoms include headaches, leg pain, numbness, paresthesias, tingling and weakness. Pertinent negatives include no abdominal pain, bladder incontinence, bowel incontinence, chest pain, dysuria, fever, paresis, pelvic pain, perianal numbness or weight loss. Risk factors include lack of exercise and obesity.       The following portions of the patient's history were reviewed and updated as appropriate: allergies, current medications, past family history, past medical history, past social history, past surgical history and problem list.    Review of Systems  "  Constitutional: Negative for chills, fatigue, fever and weight loss.   HENT: Negative for congestion, sneezing, sore throat and trouble swallowing.    Eyes: Negative for visual disturbance.   Respiratory: Negative for cough, chest tightness, shortness of breath and wheezing.    Cardiovascular: Negative for chest pain, palpitations and leg swelling.   Gastrointestinal: Negative for abdominal pain, bowel incontinence, constipation, diarrhea, nausea and vomiting.   Genitourinary: Negative for bladder incontinence, dysuria, frequency, pelvic pain and urgency.   Musculoskeletal: Positive for back pain. Negative for neck pain.   Skin: Negative for rash.   Neurological: Positive for tingling, weakness, numbness, headaches and paresthesias. Negative for dizziness.   Psychiatric/Behavioral:        Patient denies any feelings of depression and has not felt down, hopeless or lost interest in any activities.   All other systems reviewed and are negative.      Objective   Vitals:    10/24/19 0843   BP: 142/92   BP Location: Left arm   Patient Position: Sitting   Cuff Size: Adult   Pulse: 90   Weight: (!) 152 kg (335 lb)   Height: 190.5 cm (75\")   PainSc:   6   PainLoc: Back      Body mass index is 41.87 kg/m².  Physical Exam   Constitutional: He is oriented to person, place, and time. He appears well-developed and well-nourished. No distress.   HENT:   Head: Normocephalic and atraumatic.   Nose: Nose normal.   Mouth/Throat: Oropharynx is clear and moist. No oropharyngeal exudate.   Eyes: Conjunctivae and EOM are normal. Pupils are equal, round, and reactive to light. No scleral icterus.   Neck: Normal range of motion. Neck supple.   Cardiovascular: Normal rate, regular rhythm and normal heart sounds. Exam reveals no gallop and no friction rub.   No murmur heard.  Pulmonary/Chest: Effort normal and breath sounds normal. No respiratory distress. He has no wheezes. He has no rales.   Abdominal: Soft. Bowel sounds are normal. He " exhibits no distension. There is no tenderness. There is no rebound and no guarding.   Musculoskeletal: Normal range of motion. He exhibits no edema.        Lumbar back: He exhibits pain and spasm.   There is no vertebral point tenderness.  He does have some paravertebral muscle spasm noted bilaterally in the lumbar spine.   Lymphadenopathy:     He has no cervical adenopathy.   Neurological: He is alert and oriented to person, place, and time. No cranial nerve deficit.   Skin: Skin is warm and dry. No rash noted.   Psychiatric: He has a normal mood and affect. His behavior is normal. Judgment and thought content normal.   Nursing note and vitals reviewed.      Assessment/Plan   Suhail was seen today for back pain.    Diagnoses and all orders for this visit:    Acute bilateral low back pain with bilateral sciatica  -     methylPREDNISolone (MEDROL, FRANCINE,) 4 MG tablet; Take as directed on package instructions.  -     methylPREDNISolone acetate (DEPO-medrol) injection 80 mg  -     XR Spine Lumbar Complete 4+VW; Future      I will obtain an x-ray of the lumbar spine.  He will continue with meloxicam and Flexeril.  He is given a Medrol Dosepak to help with the inflammation.  He is also given Depo-Medrol 80 mg IM.  He will let me know if symptoms have not improved within the next 1 to 2 weeks, or sooner if symptoms worsen.    Patient's Body mass index is 41.87 kg/m². BMI is above normal parameters. Recommendations include: educational material.           PHQ-2/PHQ-9 Depression Screening 10/24/2019   Little interest or pleasure in doing things 0   Feeling down, depressed, or hopeless 0   Total Score 0

## 2019-10-28 ENCOUNTER — OFFICE VISIT (OUTPATIENT)
Dept: CARDIAC SURGERY | Facility: CLINIC | Age: 44
End: 2019-10-28

## 2019-10-28 ENCOUNTER — OFFICE VISIT (OUTPATIENT)
Dept: CARDIOLOGY | Facility: CLINIC | Age: 44
End: 2019-10-28

## 2019-10-28 VITALS
HEIGHT: 75 IN | WEIGHT: 315 LBS | SYSTOLIC BLOOD PRESSURE: 136 MMHG | BODY MASS INDEX: 39.17 KG/M2 | HEART RATE: 88 BPM | DIASTOLIC BLOOD PRESSURE: 94 MMHG | OXYGEN SATURATION: 98 %

## 2019-10-28 VITALS
BODY MASS INDEX: 39.17 KG/M2 | SYSTOLIC BLOOD PRESSURE: 136 MMHG | RESPIRATION RATE: 16 BRPM | TEMPERATURE: 97.8 F | OXYGEN SATURATION: 98 % | WEIGHT: 315 LBS | DIASTOLIC BLOOD PRESSURE: 94 MMHG | HEIGHT: 75 IN | HEART RATE: 88 BPM

## 2019-10-28 DIAGNOSIS — I35.1 NONRHEUMATIC AORTIC VALVE INSUFFICIENCY: ICD-10-CM

## 2019-10-28 DIAGNOSIS — Q23.1 BICUSPID AORTIC VALVE DETERMINED BY IMAGING: ICD-10-CM

## 2019-10-28 DIAGNOSIS — I42.2 HYPERTROPHIC CARDIOMYOPATHY (HCC): Primary | Chronic | ICD-10-CM

## 2019-10-28 DIAGNOSIS — E66.01 MORBIDLY OBESE (HCC): Chronic | ICD-10-CM

## 2019-10-28 DIAGNOSIS — I77.810 AORTIC ECTASIA, THORACIC (HCC): ICD-10-CM

## 2019-10-28 DIAGNOSIS — E66.01 CLASS 3 SEVERE OBESITY DUE TO EXCESS CALORIES WITHOUT SERIOUS COMORBIDITY WITH BODY MASS INDEX (BMI) OF 40.0 TO 44.9 IN ADULT (HCC): ICD-10-CM

## 2019-10-28 DIAGNOSIS — I71.20 THORACIC AORTIC ANEURYSM WITHOUT RUPTURE (HCC): Primary | ICD-10-CM

## 2019-10-28 DIAGNOSIS — I42.2 HYPERTROPHIC CARDIOMYOPATHY (HCC): Chronic | ICD-10-CM

## 2019-10-28 DIAGNOSIS — F41.1 GENERALIZED ANXIETY DISORDER: Chronic | ICD-10-CM

## 2019-10-28 PROCEDURE — 99214 OFFICE O/P EST MOD 30 MIN: CPT | Performed by: INTERNAL MEDICINE

## 2019-10-28 PROCEDURE — 99204 OFFICE O/P NEW MOD 45 MIN: CPT | Performed by: THORACIC SURGERY (CARDIOTHORACIC VASCULAR SURGERY)

## 2019-10-28 NOTE — PROGRESS NOTES
Cardiovascular Medicine      Yony Greene M.D., Ph.D., [facility]             History of Present Illness  This is a 44 y.o. male with:    1.  HCM  A. CMRI with slight LGE and asymmetric septal hypertrophy  B. Normal HD response to DSE  C. ZIO with NO NSVT  2. Ectasia of TAA, 4.1 cm  3. AI  4. Possible BAV  5. Obesity      Suhail Gamino is a 44 y.o. male who returns to clinic today.  He was found to have marked LVH on EKG without a significant history of hypertension.  I was concerned for possible hypertrophic cardiomyopathy so he was referred for a 2-D echocardiogram.  Unfortunately, this shows marked LVH without evidence of obstruction.  He had severe asymmetric septal hypertrophy.  H  No increased gradients.  He's had some mild dyspnea on exertion, but no resting, exertional or nocturnal angina.  He's had increased cardiac awareness which he characterizes as palpitations.  There is no family history of HCM or SCA.  He was also found have ectasia of his thoracic aorta at 3.7 cm by echo.  He had a DSE which showed no evidence of inducible ischemia.  He had a normal hemodynamic response to dobutamine.  He had a ZIO which showed no evidence of NSVT.  He was sent for cardiac MRI which is consistent with hypertrophic cardiomyopathy with very slight LGE in the septum.  It was also concerning for a bicuspid aortic valve.  Patient's ascending aorta was dilated at 4.1 cm.  He returns today after a recent echocardiogram.  This showed stable septal asymmetry consistent with his known hypertrophic cardiomyopathy.  He had no significant gradients.  At his last appointment he was endorsing increased social stressors.  He had recently lost his job.  He was having some atypical chest pain.  I did discuss with him that this is likely related to stress and not cardiac.  However, some patients with hypertrophic cardiomyopathy get epicardial bridging.  He went for a coronary CTA.  He returns today for results.   Results were provided.  Fortunately, he is had no further resting, exertional or nocturnal angina.    Review of Systems - Review of Systems   Cardiovascular: Positive for dyspnea on exertion. Negative for chest pain, claudication, cyanosis, irregular heartbeat, leg swelling, near-syncope, orthopnea, palpitations, paroxysmal nocturnal dyspnea and syncope.   Respiratory: Positive for shortness of breath. Negative for cough, hemoptysis, sleep disturbances due to breathing, snoring, sputum production and wheezing.         All other systems were reviewed and were negative.    family history includes Cancer in his paternal grandfather; Hypertension in his father; No Known Problems in his daughter, daughter, mother, sister, sister, sister, son, and son.     reports that he quit smoking about 20 years ago. His smoking use included cigarettes. His smokeless tobacco use includes snuff. He reports that he does not drink alcohol or use drugs.    No Known Allergies      Current Outpatient Medications:   •  busPIRone (BUSPAR) 10 MG tablet, Take 1 tablet by mouth 2 (Two) Times a Day., Disp: 60 tablet, Rfl: 5  •  cyclobenzaprine (FLEXERIL) 10 MG tablet, TAKE 1 TABLET BY MOUTH THREE TIMES A DAY AS NEEDED FOR MUSCLE SPASMS, Disp: 60 tablet, Rfl: 5  •  fexofenadine (ALLEGRA) 180 MG tablet, TAKE 1 TABLET BY MOUTH DAILY, Disp: 30 tablet, Rfl: 5  •  meloxicam (MOBIC) 15 MG tablet, Take 15 mg by mouth Daily., Disp: , Rfl:   •  methylPREDNISolone (MEDROL, FRANCINE,) 4 MG tablet, Take as directed on package instructions., Disp: 1 each, Rfl: 0  •  metoprolol succinate XL (TOPROL-XL) 50 MG 24 hr tablet, Take 1 tablet by mouth As Needed (Take one tablet the night before the CT scan and one tablet the morning of the test)., Disp: 2 tablet, Rfl: 0    Physical Exam:    Vitals:    10/28/19 1526   BP: 136/94   Pulse: 88   SpO2: 98%     Body mass index is 41.33 kg/m².   Pulse Ox: Normal  on room air  General: alert, appears stated age and cooperative      Body Habitus: well-nourished    HEENT: Head: Normocephalic, no lesions, without obvious abnormality. No arcus senilis, xanthelasma or xanthomas.    JVP: Volume/Pulsation: Normal  Carotid Exam: no bruit normal pulsation bilaterally   Carotid Volume: normal    Subclavian Bruit: absent  Vertebral Bruit: absent  Respirations: no increased work of breathing   Chest:  Normal    Pulmonary:Normal     Precordium: Normal impulses. P2 is not palpable.   Omega:  normal size and placement Palpable S4: absent.  Heart rate: normal    Heart Rhythm: regular     Heart Sounds: S1: normal  S2: normal intensity  S3: absent   S4: absent  Opening Snap: absent  A2-OS:  absent.   Pericardial rub: absent    Ejection click: None      Murmurs:  absent   Extremity: moves all extremities equally.         DATA REVIEWED:     Results for orders placed in visit on 03/22/19   Adult Transthoracic Echo Limited W/ Cont if Necessary Per Protocol    Narrative · Limited echocardiogram for aortic diameters: the study is technically   difficult for diagnosis.  · Left ventricle systolic function is normal. Estimated LVEF is 56-60%.   Mild concentric hypertrophy.  · The aortic valve is not well-visualized, but there is increased aortic   gradient not meeting criteria for aortic stenosis.  · There is mild to moderate aortic insufficiency by Doppler envelope.  · Visualized portions of the aorta are normal. However, the segment   measured in the prior echocardiogram is not visualized on the current   study.                  --------------------------------------------------------------------------------------------------  LABS:   Lab Results   Component Value Date    GLUCOSE 95 09/25/2019    BUN 13 09/25/2019    CREATININE 0.91 09/25/2019    EGFRIFNONA 91 09/25/2019    BCR 14.3 09/25/2019    K 4.1 09/25/2019    CO2 28.4 09/25/2019    CALCIUM 9.5 09/25/2019    ALBUMIN 4.10 04/29/2019    AST 22 04/29/2019    ALT 18 04/29/2019     Lab Results   Component Value Date     WBC 8.95 04/29/2019    HGB 15.4 04/29/2019    HCT 46.5 04/29/2019    MCV 88.7 04/29/2019     04/29/2019     Lab Results   Component Value Date    CHOL 163 04/29/2019    TRIG 47 04/29/2019    HDL 52 04/29/2019     (H) 04/29/2019     Lab Results   Component Value Date    TSH 2.060 04/29/2019     No results found for: CKTOTAL, CKMB, CKMBINDEX, TROPONINI, TROPONINT  No results found for: HGBA1C  No results found for: DDIMER  Lab Results   Component Value Date    ALT 18 04/29/2019     No results found for: HGBA1C  Lab Results   Component Value Date    CREATININE 0.91 09/25/2019     No results found for: IRON, TIBC, FERRITIN  No results found for: INR, PROTIME    Assessment/Plan      1. Hypertrophic cardiomyopathy (CMS/HCC).  LVH is: Severe with IVS-3.9cm. There are no signs of obstruction. There are no high-risk family history.  MRI shows slight LGE isolated to the septum.  LGE. Holter monitor showed normal burden of PVCs here recently.   -Recommended 1st degree relative surveillance  -No indication for ICD.  -Yearly EKG recommended, or if clinical status changes  -Call for concerning symptoms  -Will plan on repeat 2D TTE in one year  -Annual clinical surveillance  -The usefulness of beta blockade and calcium channel blockers to alter clinical outcome is not well established for the management of asymptomatic patient   Based on current ACC guidelines.  -Worsening symptoms were discussed.  I have asked him to contact me if any of these develop.  -He does not currently meet indications for septal reduction therapy because he is asymptomatic.  -Low intensity aerobic exercise is reasonable as a part of a healthy lifestyle    2. Bicuspid aortic valve determined by imaging with nonrheumatic aortic insufficiency. ACC stage B.  There are no surgical indications at this time.  · The patient has been advised to remain in clinical surveillance every 12 months.  · Signs and symptoms of worsening valve disease  discussed.  I've asked the patient contact me for an earlier appointment if these develop.  · I've recommended a repeat 2D TTE every 1 year.  · TTE due: 2020.  No indication based on 2017 ACC/AHA guidelines for IE prophylaxis for dental procedures: Optimal oral health is recommended through regular professional dental care and the use of appropriate dental products, such as manual, powered, and ultrasonic toothbrushes; dental floss; and other plaque-removal devices    3. Aortic ectasia, thoracic (CMS/HCC).  Patient's most recent TTE, 2019,  showed a tubular ascending aorta diameter of 3.9 cm. Last documented BSA: There is no height or weight on file to calculate BSA.. This provides an ASI of 1.5 centimeters/meter squared.  Based on the best data that we have and ASI of less than 2.0 cm/meter squared has a 5-year rupture-free survival of 97.6%.  The next marked step-up in yearly event rate occurs at above 2.75.  Additionally the  patient does not have a family history of aneurysmal disease.  Patient's aortic valve is not trileaflet.  The patient does have systemic diseases diagnosed associated with aortic aneurysms: BAV.  I again explained to the patient that they currently do not meet criteria for a diagnosis of an aneurysm.  I also explained the normal aorta grows about 0.1 cm per 10 years after the age of 25.  We also discussed issues that can lead to more rapid widening of the aorta, such as uncontrolled hypertension.  I again explained that, in contrast to aneurysms, we have little clinical trial data to guide on optimal timing for active surveillance.  -Recommended a TTE in12 months to exclude interval dilatation  -I went over the signs and symptoms of aneurysmal dilatation.  -I've recommended clinical evaluation every 1 year.  -Next TTE due: 2020  -Importance of blood pressure control less than 130/80 emphasized  -Avoid smoking      4. Cardiac Risk Assessments based on 2019 ACCF guidelines:  · A team-based  care approach is recommended for the control of risk factors associated with ASCAD.  As such, Suhail Gamino was requested to have ongoing follow-up with their PCP.  · For adults 20 to 39 years of age it is reasonable to assess traditional risk factors every 4 to 6 years; for adults 40 to 75 years of age, it is recommended to assess ASCVD risk which was calculated as The CVD Risk score (D'Agostino, et al., 2008) failed to calculate for the following reasons:  ·   The patient has a prior MI, stroke, CHF, or peripheral vascular disease diagnosis.   · Continue follow-up visits with PCP for monitoring of labs; diet emphasizing intake of vegetables, fruits, nuts, whole grains and fish is recommended.  · Physical activity recommendations were provided.  · Essential HTN is a significant risk factor for stroke, heart disease and vascular disease. I've recommended the patient continue current medications, if any, as prescribed by the primary care provider. I recommended they have close follow-up for ongoing mgmt of this and the medical comorbidities associated with HTN with their PCP.  They were also provided with information regarding maintaining a healthy weight, heart-healthy dietary pattern DASH information.  Goal blood pressure less than 130/80.   · The patient's BMI is recommended to be calculated at least annually.  The patient's BMI is There is no height or weight on file to calculate BMI..  This places the patient in weight class:  Obese Class III extreme obesity: > or equal to 40kg/m2.   Hand out, increase aerobic activity  cut out extra servings, decrease soda or juice intake, eat breakfast, eat more fruits and vegetables, family to eat at dinner table more often, have 3 meals a day, increase physical activity, increase water intake, keep TV off during meals, plan meals, reduce fast food intake, reduce portion size and reduce screen time; close PCP follow-up.  · Tobacco status assessed at every visits.   The patient's nicotine status: is a former smoker  -Congratulated on cessation  -Avoid tobacco    5.  Chest pain syndrome, atypical.  Coronary artery calcium score is 0.  No evidence of bridging.  Patient was provided with reassurance.  He will continue follow-up with his primary care provider for management of his comorbidities.    No Follow-up on file.

## 2019-11-15 DIAGNOSIS — M16.51 POST-TRAUMATIC OSTEOARTHRITIS OF RIGHT HIP: Chronic | ICD-10-CM

## 2019-11-15 DIAGNOSIS — J30.1 CHRONIC SEASONAL ALLERGIC RHINITIS DUE TO POLLEN: Chronic | ICD-10-CM

## 2019-11-18 RX ORDER — FEXOFENADINE HYDROCHLORIDE 180 MG/1
TABLET, FILM COATED ORAL
Qty: 30 TABLET | Refills: 5 | OUTPATIENT
Start: 2019-11-18

## 2019-11-18 RX ORDER — MELOXICAM 15 MG/1
15 TABLET ORAL DAILY
Qty: 30 TABLET | Refills: 5 | OUTPATIENT
Start: 2019-11-18

## 2019-11-24 PROBLEM — I71.20 THORACIC AORTIC ANEURYSM WITHOUT RUPTURE: Status: ACTIVE | Noted: 2019-11-24

## 2019-11-24 NOTE — PROGRESS NOTES
10/28/2019  Suhail Herr Thresher  1975    Chief Complaint:  Thoracic Aortic Aneurysm    HPI:      PCP:  Champ Sotomayor MD  Cardiology:  Dr Greene    44 y.o. male with HTN(uncontrolled, increased risk stroke, rupture), Obesity(uncontrolled, increased risk cardiovascular events) and Smoker(uncontrolled, increased risk cardiovascular events) , thoracic aortic aneurysm(new, increase risk rupture).  1/2 can snuff daily.  Asymptomatic thoracic aortic aneurysm.  Hypertrophic cardiomyopathy..  No TIA stroke amaurosis.  No MI claudication. No other associated signs, symptoms or modifying factors.    10/2019 CTA Coronary:  Ascending aorta 42mm.    1/2019 Stress Echo:  EF 65%, no ischemia  1/2019 Echocardiogram:  EF 55%, LA 43mm, LV 50mm, RVSP 28mmHg.  8/2019 Holter:  avg HR 83.  Single episode .  9/2019 ECG:  NSR 91, QTc 477  9/2019 Echocardiogram:  EF 55%, LA 30mm.    The following portions of the patient's history were reviewed and updated as appropriate: allergies, current medications, past family history, past medical history, past social history, past surgical history and problem list.  Recent images independently reviewed.  Available laboratory values reviewed.    PMH:  Past Medical History:   Diagnosis Date   • Abnormal EKG    • Achilles tendinitis, left leg    • Acute bronchitis    • Atrial fibrillation (CMS/HCC) 2002   • Chest pain, atypical 9/25/2019   • Fever    • Generalized anxiety disorder    • Health examination of defined subpopulation    • Heart murmur 2002   • Heart valve disease 2019   • Hip pain    • Hypertension    • Lumbar back pain     Pain radiating to lumbar region of back   • Obesity    • On long term drug therapy    • Osteoarthritis of hip    • Other long term (current) drug therapy      Past Surgical History:   Procedure Laterality Date   • HIP SURGERY Right    • VASECTOMY     • WISDOM TOOTH EXTRACTION       Family History   Problem Relation Age of Onset   • Hypertension  Father    • Hyperlipidemia Father    • Cancer Paternal Grandfather         Colorectal Cancer   • No Known Problems Mother    • No Known Problems Sister    • No Known Problems Sister    • No Known Problems Sister    • No Known Problems Son    • No Known Problems Son    • No Known Problems Daughter    • No Known Problems Daughter      Social History     Tobacco Use   • Smoking status: Former Smoker     Packs/day: 1.50     Years: 4.00     Pack years: 6.00     Types: Cigarettes     Start date: 1996     Last attempt to quit: 2000     Years since quittin.9   • Smokeless tobacco: Current User     Types: Snuff   Substance Use Topics   • Alcohol use: No   • Drug use: No       ALLERGIES:  No Known Allergies      MEDICATIONS:    Current Outpatient Medications:   •  busPIRone (BUSPAR) 10 MG tablet, Take 1 tablet by mouth 2 (Two) Times a Day. (Patient taking differently: Take 10 mg by mouth 2 (Two) Times a Day. prn), Disp: 60 tablet, Rfl: 5  •  cyclobenzaprine (FLEXERIL) 10 MG tablet, TAKE 1 TABLET BY MOUTH THREE TIMES A DAY AS NEEDED FOR MUSCLE SPASMS, Disp: 60 tablet, Rfl: 5  •  fexofenadine (ALLEGRA) 180 MG tablet, TAKE 1 TABLET BY MOUTH DAILY, Disp: 30 tablet, Rfl: 5  •  meloxicam (MOBIC) 15 MG tablet, Take 15 mg by mouth Daily., Disp: , Rfl:   •  methylPREDNISolone (MEDROL, FRANCINE,) 4 MG tablet, Take as directed on package instructions., Disp: 1 each, Rfl: 0  •  metoprolol succinate XL (TOPROL-XL) 50 MG 24 hr tablet, Take 1 tablet by mouth As Needed (Take one tablet the night before the CT scan and one tablet the morning of the test)., Disp: 2 tablet, Rfl: 0    Review of Systems   Review of Systems   Constitution: Positive for malaise/fatigue. Negative for night sweats and weight loss.   HENT: Negative for hearing loss, hoarse voice and stridor.    Eyes: Negative for vision loss in left eye, vision loss in right eye and visual disturbance.   Cardiovascular: Positive for dyspnea on exertion. Negative for chest pain,  "leg swelling and palpitations.   Respiratory: Positive for shortness of breath. Negative for cough and hemoptysis.    Hematologic/Lymphatic: Negative for adenopathy and bleeding problem. Does not bruise/bleed easily.   Skin: Negative for color change, poor wound healing and rash.   Musculoskeletal: Negative for arthritis, back pain, muscle weakness and neck pain.   Gastrointestinal: Negative for abdominal pain, dysphagia and heartburn.   Neurological: Negative for dizziness, numbness and seizures.   Psychiatric/Behavioral: Negative for altered mental status, depression and memory loss. The patient is not nervous/anxious.        Physical Exam   Vitals:    10/28/19 1453 10/28/19 1502   BP: (!) 150/104 136/94   BP Location: Left arm Right arm   Patient Position: Sitting Sitting   Cuff Size: Adult Adult   Pulse: 88    Resp: 16    Temp: 97.8 °F (36.6 °C)    TempSrc: Temporal    SpO2: 98%    Weight: (!) 150 kg (331 lb 6.4 oz)    Height: 190.5 cm (75\")      Physical Exam   Constitutional: He is oriented to person, place, and time. He is active and cooperative. He does not appear ill. No distress.   HENT:   Head: Atraumatic.   Right Ear: Hearing normal.   Left Ear: Hearing normal.   Nose: No nasal deformity. No epistaxis.   Mouth/Throat: He does not have dentures. Normal dentition.   Eyes: Conjunctivae and lids are normal. Right pupil is round and reactive. Left pupil is round and reactive.   Neck: No JVD present. Carotid bruit is not present. No tracheal deviation present. No thyroid mass and no thyromegaly present.   Cardiovascular: Normal rate and regular rhythm.   No murmur heard.  Pulses:       Carotid pulses are 2+ on the right side, and 2+ on the left side.       Radial pulses are 2+ on the right side, and 2+ on the left side.        Dorsalis pedis pulses are 2+ on the right side, and 2+ on the left side.   Pulmonary/Chest: Effort normal and breath sounds normal.   Abdominal: Soft. He exhibits no distension and no " mass. There is no splenomegaly or hepatomegaly. There is no tenderness.   Musculoskeletal: He exhibits no deformity.   Gait normal.    Lymphadenopathy:     He has no cervical adenopathy.        Right: No supraclavicular adenopathy present.        Left: No supraclavicular adenopathy present.   Neurological: He is alert and oriented to person, place, and time. He has normal strength.   Skin: Skin is warm and dry. No cyanosis or erythema. No pallor.   No venous staining   Psychiatric: He has a normal mood and affect. His speech is normal. Judgment and thought content normal.     Results for SUHAIL TSAI (MRN [id number]) as of 11/24/2019 14:59  GFR 93 Ref. Range 9/25/2019 14:48   Creatinine Latest Ref Range: 0.76 - 1.27 mg/dL 0.91   BUN Latest Ref Range: 6 - 20 mg/dL 13     ASSESSMENT:  Suhail was seen today for establish care.    Diagnoses and all orders for this visit:    Thoracic aortic aneurysm without rupture (CMS/HCC)  -     CT Chest Without Contrast; Future    Hypertrophic cardiomyopathy (CMS/HCC)    Bicuspid aortic valve determined by imaging    Morbidly obese (CMS/HCC)    Class 3 severe obesity due to excess calories without serious comorbidity with body mass index (BMI) of 40.0 to 44.9 in adult (CMS/HCC)    Generalized anxiety disorder      PLAN:  Detailed discussion with Suhail Tsai regarding situation and options.  Aneurysm ascending thoracic aorta.  Surgical intervention not indicated at this time.  Will plan to follow with interval imaging.  Smoking cessation, lipid management, BP control in 120 range advised.  Discussed symptoms of rupture, details of surgical repair including aortic root replacement, reimplantation of coronary arteries, possible valve replacement, endovascular and open repair.  Risks, benefits discussed.  Understands and wishes to proceed with plan    Return in 1 year with CT Chest without contrast    Return after above studies complete  Obesity Class  3.  Increased risk cardiovascular events, sleep and breathing disorders, joint issues, type 2 diabetes mellitus. Options for weight management, heart healthy diet, exercise programs, and associated health risks of obesity discussed.    Recommended regular physical activity, progressive walking program.  Continue current medications as directed.  Will Obtain relevant old records.    Thank you for the opportunity to participate in this patient's care.    Copy to primary care provider.    EMR Dragon/Transcription disclaimer:   Much of this encounter note is an electronic transcription/translation of spoken language to printed text. The electronic translation of spoken language may permit erroneous, or at times, nonsensical words or phrases to be inadvertently transcribed; Although I have reviewed the note for such errors, some may still exist.

## 2019-12-03 DIAGNOSIS — J30.1 CHRONIC SEASONAL ALLERGIC RHINITIS DUE TO POLLEN: Chronic | ICD-10-CM

## 2019-12-03 RX ORDER — FEXOFENADINE HCL 180 MG/1
180 TABLET ORAL DAILY
Qty: 30 TABLET | Refills: 0 | Status: SHIPPED | OUTPATIENT
Start: 2019-12-03 | End: 2019-12-30

## 2019-12-03 RX ORDER — MELOXICAM 15 MG/1
15 TABLET ORAL DAILY
Qty: 30 TABLET | Refills: 0 | Status: SHIPPED | OUTPATIENT
Start: 2019-12-03 | End: 2019-12-30

## 2019-12-30 DIAGNOSIS — J30.1 CHRONIC SEASONAL ALLERGIC RHINITIS DUE TO POLLEN: Chronic | ICD-10-CM

## 2019-12-30 RX ORDER — MELOXICAM 15 MG/1
15 TABLET ORAL DAILY
Qty: 30 TABLET | Refills: 0 | Status: SHIPPED | OUTPATIENT
Start: 2019-12-30 | End: 2020-02-04

## 2019-12-30 RX ORDER — FEXOFENADINE HYDROCHLORIDE 180 MG/1
TABLET, FILM COATED ORAL
Qty: 30 TABLET | Refills: 0 | Status: SHIPPED | OUTPATIENT
Start: 2019-12-30 | End: 2020-02-04

## 2020-01-31 DIAGNOSIS — J30.1 CHRONIC SEASONAL ALLERGIC RHINITIS DUE TO POLLEN: Chronic | ICD-10-CM

## 2020-01-31 DIAGNOSIS — M16.51 POST-TRAUMATIC OSTEOARTHRITIS OF RIGHT HIP: Chronic | ICD-10-CM

## 2020-02-04 RX ORDER — MELOXICAM 15 MG/1
15 TABLET ORAL DAILY
Qty: 30 TABLET | Refills: 0 | Status: SHIPPED | OUTPATIENT
Start: 2020-02-04 | End: 2020-03-09

## 2020-02-04 RX ORDER — FEXOFENADINE HYDROCHLORIDE 180 MG/1
TABLET, FILM COATED ORAL
Qty: 30 TABLET | Refills: 0 | Status: SHIPPED | OUTPATIENT
Start: 2020-02-04 | End: 2020-03-09

## 2020-02-04 RX ORDER — CYCLOBENZAPRINE HCL 10 MG
TABLET ORAL
Qty: 60 TABLET | Refills: 5 | Status: SHIPPED | OUTPATIENT
Start: 2020-02-04 | End: 2020-09-10

## 2020-03-09 DIAGNOSIS — J30.1 CHRONIC SEASONAL ALLERGIC RHINITIS DUE TO POLLEN: Chronic | ICD-10-CM

## 2020-03-09 RX ORDER — FEXOFENADINE HYDROCHLORIDE 180 MG/1
TABLET, FILM COATED ORAL
Qty: 30 TABLET | Refills: 5 | Status: SHIPPED | OUTPATIENT
Start: 2020-03-09 | End: 2020-09-21 | Stop reason: SDUPTHER

## 2020-03-09 RX ORDER — MELOXICAM 15 MG/1
15 TABLET ORAL DAILY
Qty: 30 TABLET | Refills: 5 | Status: SHIPPED | OUTPATIENT
Start: 2020-03-09 | End: 2020-06-12

## 2020-03-30 ENCOUNTER — HOSPITAL ENCOUNTER (EMERGENCY)
Facility: HOSPITAL | Age: 45
Discharge: HOME | End: 2020-03-30
Attending: FAMILY MEDICINE | Admitting: EMERGENCY MEDICINE

## 2020-03-30 ENCOUNTER — APPOINTMENT (OUTPATIENT)
Dept: GENERAL RADIOLOGY | Facility: HOSPITAL | Age: 45
End: 2020-03-30

## 2020-03-30 VITALS
DIASTOLIC BLOOD PRESSURE: 67 MMHG | SYSTOLIC BLOOD PRESSURE: 155 MMHG | TEMPERATURE: 98.3 F | HEART RATE: 102 BPM | RESPIRATION RATE: 18 BRPM | OXYGEN SATURATION: 96 %

## 2020-03-30 DIAGNOSIS — R07.89 CHEST PRESSURE: Primary | ICD-10-CM

## 2020-03-30 DIAGNOSIS — R00.2 PALPITATIONS: ICD-10-CM

## 2020-03-30 DIAGNOSIS — I51.7 HYPERTROPHIC CARDIOMEGALY: ICD-10-CM

## 2020-03-30 LAB
ALBUMIN SERPL-MCNC: 3.9 G/DL (ref 3.5–5.2)
ALBUMIN/GLOB SERPL: 1.2 G/DL
ALP SERPL-CCNC: 73 U/L (ref 39–117)
ALT SERPL W P-5'-P-CCNC: 17 U/L (ref 1–41)
ANION GAP SERPL CALCULATED.3IONS-SCNC: 12 MMOL/L (ref 5–15)
AST SERPL-CCNC: 21 U/L (ref 1–40)
BASOPHILS # BLD AUTO: 0.07 10*3/MM3 (ref 0–0.2)
BASOPHILS NFR BLD AUTO: 0.7 % (ref 0–1.5)
BILIRUB SERPL-MCNC: 0.3 MG/DL (ref 0.2–1.2)
BUN BLD-MCNC: 11 MG/DL (ref 6–20)
BUN/CREAT SERPL: 13.3 (ref 7–25)
CALCIUM SPEC-SCNC: 9.6 MG/DL (ref 8.6–10.5)
CHLORIDE SERPL-SCNC: 105 MMOL/L (ref 98–107)
CO2 SERPL-SCNC: 26 MMOL/L (ref 22–29)
CREAT BLD-MCNC: 0.83 MG/DL (ref 0.76–1.27)
DEPRECATED RDW RBC AUTO: 37.5 FL (ref 37–54)
EOSINOPHIL # BLD AUTO: 0.1 10*3/MM3 (ref 0–0.4)
EOSINOPHIL NFR BLD AUTO: 1 % (ref 0.3–6.2)
ERYTHROCYTE [DISTWIDTH] IN BLOOD BY AUTOMATED COUNT: 12.3 % (ref 12.3–15.4)
GFR SERPL CREATININE-BSD FRML MDRD: 102 ML/MIN/1.73
GLOBULIN UR ELPH-MCNC: 3.3 GM/DL
GLUCOSE BLD-MCNC: 76 MG/DL (ref 65–99)
HCT VFR BLD AUTO: 48.9 % (ref 37.5–51)
HGB BLD-MCNC: 16.6 G/DL (ref 13–17.7)
HOLD SPECIMEN: NORMAL
IMM GRANULOCYTES # BLD AUTO: 0.04 10*3/MM3 (ref 0–0.05)
IMM GRANULOCYTES NFR BLD AUTO: 0.4 % (ref 0–0.5)
LYMPHOCYTES # BLD AUTO: 1.24 10*3/MM3 (ref 0.7–3.1)
LYMPHOCYTES NFR BLD AUTO: 12.5 % (ref 19.6–45.3)
MAGNESIUM SERPL-MCNC: 2.2 MG/DL (ref 1.6–2.6)
MCH RBC QN AUTO: 28.8 PG (ref 26.6–33)
MCHC RBC AUTO-ENTMCNC: 33.9 G/DL (ref 31.5–35.7)
MCV RBC AUTO: 84.9 FL (ref 79–97)
MONOCYTES # BLD AUTO: 0.87 10*3/MM3 (ref 0.1–0.9)
MONOCYTES NFR BLD AUTO: 8.8 % (ref 5–12)
NEUTROPHILS # BLD AUTO: 7.59 10*3/MM3 (ref 1.7–7)
NEUTROPHILS NFR BLD AUTO: 76.6 % (ref 42.7–76)
NRBC BLD AUTO-RTO: 0 /100 WBC (ref 0–0.2)
NT-PROBNP SERPL-MCNC: 720.8 PG/ML (ref 5–450)
PLATELET # BLD AUTO: 264 10*3/MM3 (ref 140–450)
PMV BLD AUTO: 9.8 FL (ref 6–12)
POTASSIUM BLD-SCNC: 4.2 MMOL/L (ref 3.5–5.2)
PROT SERPL-MCNC: 7.2 G/DL (ref 6–8.5)
RBC # BLD AUTO: 5.76 10*6/MM3 (ref 4.14–5.8)
SODIUM BLD-SCNC: 143 MMOL/L (ref 136–145)
TROPONIN T SERPL-MCNC: <0.01 NG/ML (ref 0–0.03)
WBC NRBC COR # BLD: 9.91 10*3/MM3 (ref 3.4–10.8)
WHOLE BLOOD HOLD SPECIMEN: NORMAL

## 2020-03-30 PROCEDURE — 99283 EMERGENCY DEPT VISIT LOW MDM: CPT

## 2020-03-30 PROCEDURE — 93005 ELECTROCARDIOGRAM TRACING: CPT

## 2020-03-30 PROCEDURE — 93005 ELECTROCARDIOGRAM TRACING: CPT | Performed by: FAMILY MEDICINE

## 2020-03-30 PROCEDURE — 71045 X-RAY EXAM CHEST 1 VIEW: CPT

## 2020-03-30 PROCEDURE — 93010 ELECTROCARDIOGRAM REPORT: CPT | Performed by: INTERNAL MEDICINE

## 2020-03-30 PROCEDURE — 80053 COMPREHEN METABOLIC PANEL: CPT | Performed by: PHYSICIAN ASSISTANT

## 2020-03-30 PROCEDURE — 83735 ASSAY OF MAGNESIUM: CPT | Performed by: PHYSICIAN ASSISTANT

## 2020-03-30 PROCEDURE — 83880 ASSAY OF NATRIURETIC PEPTIDE: CPT | Performed by: PHYSICIAN ASSISTANT

## 2020-03-30 PROCEDURE — 84484 ASSAY OF TROPONIN QUANT: CPT | Performed by: PHYSICIAN ASSISTANT

## 2020-03-30 PROCEDURE — 36415 COLL VENOUS BLD VENIPUNCTURE: CPT

## 2020-03-30 PROCEDURE — 85025 COMPLETE CBC W/AUTO DIFF WBC: CPT | Performed by: PHYSICIAN ASSISTANT

## 2020-03-30 RX ORDER — SODIUM CHLORIDE 0.9 % (FLUSH) 0.9 %
10 SYRINGE (ML) INJECTION AS NEEDED
Status: DISCONTINUED | OUTPATIENT
Start: 2020-03-30 | End: 2020-03-30 | Stop reason: HOSPADM

## 2020-03-30 RX ADMIN — SODIUM CHLORIDE 1000 ML: 900 INJECTION, SOLUTION INTRAVENOUS at 19:29

## 2020-04-29 ENCOUNTER — OFFICE VISIT (OUTPATIENT)
Dept: CARDIOLOGY | Facility: CLINIC | Age: 45
End: 2020-04-29

## 2020-04-29 DIAGNOSIS — I35.1 NONRHEUMATIC AORTIC VALVE INSUFFICIENCY: ICD-10-CM

## 2020-04-29 DIAGNOSIS — I42.2 HYPERTROPHIC CARDIOMYOPATHY (HCC): Chronic | ICD-10-CM

## 2020-04-29 DIAGNOSIS — R06.09 DYSPNEA ON EXERTION: ICD-10-CM

## 2020-04-29 DIAGNOSIS — Q23.1 BICUSPID AORTIC VALVE DETERMINED BY IMAGING: ICD-10-CM

## 2020-04-29 DIAGNOSIS — I71.20 THORACIC AORTIC ANEURYSM WITHOUT RUPTURE (HCC): ICD-10-CM

## 2020-04-29 DIAGNOSIS — R00.2 PALPITATIONS: Primary | ICD-10-CM

## 2020-04-29 PROCEDURE — 99214 OFFICE O/P EST MOD 30 MIN: CPT | Performed by: INTERNAL MEDICINE

## 2020-04-29 NOTE — PATIENT INSTRUCTIONS
Palpitations  Palpitations are feelings that your heartbeat is not normal. Your heartbeat may feel like it is:  · Uneven.  · Faster than normal.  · Fluttering.  · Skipping a beat.  This is usually not a serious problem. In some cases, you may need tests to rule out any serious problems.  Follow these instructions at home:  Pay attention to any changes in your condition. Take these actions to help manage your symptoms:  Eating and drinking  · Avoid:  ? Coffee, tea, soft drinks, and energy drinks.  ? Chocolate.  ? Alcohol.  ? Diet pills.  Lifestyle    · Try to lower your stress. These things can help you relax:  ? Yoga.  ? Deep breathing and meditation.  ? Exercise.  ? Using words and images to create positive thoughts (guided imagery).  ? Using your mind to control things in your body (biofeedback).  · Do not use drugs.  · Get plenty of rest and sleep. Keep a regular bed time.  General instructions    · Take over-the-counter and prescription medicines only as told by your doctor.  · Do not use any products that contain nicotine or tobacco, such as cigarettes and e-cigarettes. If you need help quitting, ask your doctor.  · Keep all follow-up visits as told by your doctor. This is important. You may need more tests if palpitations do not go away or get worse.  Contact a doctor if:  · Your symptoms last more than 24 hours.  · Your symptoms occur more often.  Get help right away if you:  · Have chest pain.  · Feel short of breath.  · Have a very bad headache.  · Feel dizzy.  · Pass out (faint).  Summary  · Palpitations are feelings that your heartbeat is uneven or faster than normal. It may feel like your heart is fluttering or skipping a beat.  · Avoid food and drinks that may cause palpitations. These include caffeine, chocolate, and alcohol.  · Try to lower your stress. Do not smoke or use drugs.  · Get help right away if you faint or have chest pain, shortness of breath, a severe headache, or dizziness.  This  information is not intended to replace advice given to you by your health care provider. Make sure you discuss any questions you have with your health care provider.  Document Released: 09/26/2009 Document Revised: 01/30/2019 Document Reviewed: 01/30/2019  [employer] Interactive Patient Education © 2020 [employer]

## 2020-04-29 NOTE — PROGRESS NOTES
Pulmonary Arterial Hypertension & Heart Failure   Yony Greene M.D., Ph.D., [facility]              Non-Face-To-Face Scheduled Telephone Service    Suhail Gamino is a 44 y.o. male who has requested telephone service in lieu of a follow-up appointment for results and advice.  This patient is an established patient.  There is no face-to-face service planned within the next 24 hours.  There also has been no E/M in the past 7 days.      Suhail Gamino is a 44 y.o. male who is followed in my clinic typically. He has HCM. He has been having elevated HRs. He was seen in our ED 3/30/2020. He states his HR was elevated. He had some intermittent dizziness and abdominal pain.   He felt like it was almost a panic attack. He had increased cardiac awareness.   Review of Systems   Cardiovascular: Positive for chest pain, dyspnea on exertion, irregular heartbeat and palpitations.   Respiratory: Positive for shortness of breath.    Neurological: Positive for dizziness.     family history includes Cancer in his paternal grandfather; Hyperlipidemia in his father; Hypertension in his father; No Known Problems in his daughter, daughter, mother, sister, sister, sister, son, and son.   reports that he quit smoking about 20 years ago. His smoking use included cigarettes. He started smoking about 23 years ago. He has a 6.00 pack-year smoking history. His smokeless tobacco use includes snuff. He reports that he does not drink alcohol or use drugs.  No Known Allergies    Current Outpatient Medications:   •  ALLERGY RELIEF 180 MG tablet, TAKE 1 TABLET BY MOUTH DAILY, Disp: 30 tablet, Rfl: 5  •  busPIRone (BUSPAR) 10 MG tablet, Take 1 tablet by mouth 2 (Two) Times a Day. (Patient taking differently: Take 10 mg by mouth 2 (Two) Times a Day. prn), Disp: 60 tablet, Rfl: 5  •  cyclobenzaprine (FLEXERIL) 10 MG tablet, TAKE 1 TABLET BY MOUTH THREE TIMES A DAY AS NEEDED FOR MUSCLE SPASMS, Disp: 60 tablet, Rfl: 5  •   meloxicam (MOBIC) 15 MG tablet, TAKE 1 TABLET BY MOUTH DAILY, Disp: 30 tablet, Rfl: 5  •  methylPREDNISolone (MEDROL, FRANCINE,) 4 MG tablet, Take as directed on package instructions., Disp: 1 each, Rfl: 0  •  metoprolol succinate XL (TOPROL-XL) 50 MG 24 hr tablet, Take 1 tablet by mouth As Needed (Take one tablet the night before the CT scan and one tablet the morning of the test)., Disp: 2 tablet, Rfl: 0      DATA REVIEWED:     TTE/YARELY:  Results for orders placed in visit on 03/22/19   Adult Transthoracic Echo Limited W/ Cont if Necessary Per Protocol    Narrative · Limited echocardiogram for aortic diameters: the study is technically   difficult for diagnosis.  · Left ventricle systolic function is normal. Estimated LVEF is 56-60%.   Mild concentric hypertrophy.  · The aortic valve is not well-visualized, but there is increased aortic   gradient not meeting criteria for aortic stenosis.  · There is mild to moderate aortic insufficiency by Doppler envelope.  · Visualized portions of the aorta are normal. However, the segment   measured in the prior echocardiogram is not visualized on the current   study.          --------------------------------------------------------------------------------------------------  LABS:     The 10-year CVD risk score (JOSH'Agostino, et al., 2008) is: 5.4%    Values used to calculate the score:      Age: 44 years      Sex: Male      Diabetic: No      Tobacco smoker: No      Systolic Blood Pressure: 155 mmHg      Is BP treated: No      HDL Cholesterol: 52 mg/dL      Total Cholesterol: 163 mg/dL  Lab Results   Component Value Date    GLUCOSE 76 03/30/2020    BUN 11 03/30/2020    CREATININE 0.83 03/30/2020    EGFRIFNONA 102 03/30/2020    BCR 13.3 03/30/2020    K 4.2 03/30/2020    CO2 26.0 03/30/2020    CALCIUM 9.6 03/30/2020    ALBUMIN 3.90 03/30/2020    AST 21 03/30/2020    ALT 17 03/30/2020     Lab Results   Component Value Date    WBC 9.91 03/30/2020    HGB 16.6 03/30/2020    HCT 48.9  03/30/2020    MCV 84.9 03/30/2020     03/30/2020     Lab Results   Component Value Date    CHOL 163 04/29/2019    TRIG 47 04/29/2019    HDL 52 04/29/2019     (H) 04/29/2019     Lab Results   Component Value Date    TSH 2.060 04/29/2019     Lab Results   Component Value Date    TROPONINT <0.010 03/30/2020     No results found for: HGBA1C  No results found for: DDIMER  Lab Results   Component Value Date    ALT 17 03/30/2020     No results found for: HGBA1C  Lab Results   Component Value Date    CREATININE 0.83 03/30/2020     No results found for: IRON, TIBC, FERRITIN  No results found for: INR, PROTIME    Assessment/Plan     1. Hypertrophic cardiomyopathy (CMS/HCC).  LVH is: Severe with IVS-3.9cm. There are no signs of obstruction. There are no high-risk family history.  MRI shows slight LGE isolated to the septum.  LGE. Holter monitor showed normal burden of PVCs here recently. He has new onset palpitations and elevated HR. Will need a ZIO to evaluate further. TST and TTE.  -Recommended 1st degree relative surveillance  -No indication for ICD.  -Yearly EKG recommended, or if clinical status changes  -Call for concerning symptoms  -Will plan on repeat 2D TTE in one year  -Annual clinical surveillance  -The usefulness of beta blockade and calcium channel blockers to alter clinical outcome is not well established for the management of asymptomatic patient   Based on current ACC guidelines.  -Worsening symptoms were discussed.  I have asked him to contact me if any of these develop.  -He does not currently meet indications for septal reduction therapy because he is asymptomatic.  -Low intensity aerobic exercise is reasonable as a part of a healthy lifestyle     2. Bicuspid aortic valve determined by imaging with nonrheumatic aortic insufficiency. ACC stage B.  There are no surgical indications at this time.  · The patient has been advised to remain in clinical surveillance every 12 months.  · Signs and  symptoms of worsening valve disease discussed.  I've asked the patient contact me for an earlier appointment if these develop.  · I've recommended a repeat 2D TTE every 1 year.  · TTE due: 2020.  · No indication based on 2017 ACC/AHA guidelines for IE prophylaxis for dental procedures: Optimal oral health is recommended through regular professional dental care and the use of appropriate dental products, such as manual, powered, and ultrasonic toothbrushes; dental floss; and other plaque-removal devices     3. Aortic ectasia, thoracic (CMS/HCC).  Patient's most recent TTE, 2019,  showed a tubular ascending aorta diameter of 3.9 cm. Last documented BSA: There is no height or weight on file to calculate BSA.. This provides an ASI of 1.5 centimeters/meter squared.  Based on the best data that we have and ASI of less than 2.0 cm/meter squared has a 5-year rupture-free survival of 97.6%.  The next marked step-up in yearly event rate occurs at above 2.75.  Additionally the  patient does not have a family history of aneurysmal disease.  Patient's aortic valve is not trileaflet.  The patient does have systemic diseases diagnosed associated with aortic aneurysms: BAV.  I again explained to the patient that they currently do not meet criteria for a diagnosis of an aneurysm.  I also explained the normal aorta grows about 0.1 cm per 10 years after the age of 25.  We also discussed issues that can lead to more rapid widening of the aorta, such as uncontrolled hypertension.  I again explained that, in contrast to aneurysms, we have little clinical trial data to guide on optimal timing for active surveillance.  -Recommended a TTE in12 months to exclude interval dilatation  -I went over the signs and symptoms of aneurysmal dilatation.  -I've recommended clinical evaluation every 1 year.  -Next TTE due: 2020  -Importance of blood pressure control less than 130/80 emphasized  -Avoid smoking    4. Dyspnea.  -TTE, ZIO, TST and  PFTs/6MWT    Return in about 1 month (around 5/29/2020).        Time: >10    Yony Greene MD PhD    You have chosen to receive care through a telephone visit. Do you consent to use a telephone visit for your medical care today? Yes

## 2020-04-30 PROBLEM — E66.01 CLASS 3 SEVERE OBESITY DUE TO EXCESS CALORIES WITHOUT SERIOUS COMORBIDITY WITH BODY MASS INDEX (BMI) OF 40.0 TO 44.9 IN ADULT: Chronic | Status: ACTIVE | Noted: 2019-10-28

## 2020-05-11 ENCOUNTER — OFFICE VISIT (OUTPATIENT)
Dept: PULMONOLOGY | Facility: CLINIC | Age: 45
End: 2020-05-11

## 2020-05-11 DIAGNOSIS — R06.09 DYSPNEA ON EXERTION: ICD-10-CM

## 2020-05-11 PROCEDURE — 94060 EVALUATION OF WHEEZING: CPT | Performed by: INTERNAL MEDICINE

## 2020-05-11 PROCEDURE — 94727 GAS DIL/WSHOT DETER LNG VOL: CPT | Performed by: INTERNAL MEDICINE

## 2020-05-11 PROCEDURE — 94729 DIFFUSING CAPACITY: CPT | Performed by: INTERNAL MEDICINE

## 2020-05-11 NOTE — PROCEDURES
Pulmonary Function Test  Performed by: Alejandra Barton MD  Authorized by: Yony Greene MD PhD      Pre Drug    FVC: 76%   FEV1: 62%   FEV1/FVC: 65%   T%   DLCO: 94%     Post Drug    FVC: 81%   FEV1: 66%   FEV1/FVC: 65%      Change in % (calculated):    FVC: 6   FEV1: 6    Interpretation   Spirometry   Spirometry shows moderate obstruction. The patient's FVC and FEV1 response to bronchodilators has insignificant change.   Review of FVL curve   Effort is normal.   Lung Volume Measurements  Measurements show: reduced lung volumes consistent with restriction.   Diffusion Capacity  The patient's diffusion capacity is normal.    Overall comments: Moderate obstruction with no significant bronchodilator response.  Concurrent restriction.  Normal diffusion capacity.

## 2020-05-12 LAB
BH CV STRESS BP STAGE 1: NORMAL
BH CV STRESS BP STAGE 2: NORMAL
BH CV STRESS DURATION MIN STAGE 1: 3
BH CV STRESS DURATION MIN STAGE 2: 3
BH CV STRESS DURATION MIN STAGE 3: 0
BH CV STRESS DURATION SEC STAGE 1: 0
BH CV STRESS DURATION SEC STAGE 2: 0
BH CV STRESS DURATION SEC STAGE 3: 49
BH CV STRESS GRADE STAGE 1: 10
BH CV STRESS GRADE STAGE 2: 12
BH CV STRESS GRADE STAGE 3: 14
BH CV STRESS HR STAGE 1: 125
BH CV STRESS HR STAGE 2: 148
BH CV STRESS HR STAGE 3: 157
BH CV STRESS METS STAGE 1: 5
BH CV STRESS METS STAGE 2: 7.5
BH CV STRESS METS STAGE 3: 10
BH CV STRESS PROTOCOL 1: NORMAL
BH CV STRESS RECOVERY BP: NORMAL MMHG
BH CV STRESS RECOVERY HR: 101 BPM
BH CV STRESS SPEED STAGE 1: 1.7
BH CV STRESS SPEED STAGE 2: 2.5
BH CV STRESS SPEED STAGE 3: 3.4
BH CV STRESS STAGE 1: 1
BH CV STRESS STAGE 2: 2
BH CV STRESS STAGE 3: 3
MAXIMAL PREDICTED HEART RATE: 178 BPM
PERCENT MAX PREDICTED HR: 88.2 %
STRESS BASELINE BP: NORMAL MMHG
STRESS BASELINE HR: 93 BPM
STRESS PERCENT HR: 104 %
STRESS POST ESTIMATED WORKLOAD: 9.4 METS
STRESS POST EXERCISE DUR MIN: 6 MIN
STRESS POST EXERCISE DUR SEC: 49 SEC
STRESS POST PEAK BP: NORMAL MMHG
STRESS POST PEAK HR: 157 BPM
STRESS TARGET HR: 151 BPM

## 2020-05-14 ENCOUNTER — TELEPHONE (OUTPATIENT)
Dept: CARDIOLOGY | Facility: CLINIC | Age: 45
End: 2020-05-14

## 2020-05-14 NOTE — TELEPHONE ENCOUNTER
Called patient to let him know we can come in and get a new one left voice mail       ----- Message from Shira House sent at 2020  1:52 PM CDT -----  Regarding: call back  Contact: [phone]  Suhail Gamino  79 stated his Zio has fell off and he had it on for 3 days and wanted to know if he need to get another. He wanted a call back @ [phone] to talk about this. thxs

## 2020-06-09 ENCOUNTER — OFFICE VISIT (OUTPATIENT)
Dept: CARDIOLOGY | Facility: CLINIC | Age: 45
End: 2020-06-09

## 2020-06-09 DIAGNOSIS — I42.2 HYPERTROPHIC CARDIOMYOPATHY (HCC): Chronic | ICD-10-CM

## 2020-06-09 LAB
BH CV ECHO LEFT VENTRICLE BASAL CAVITARY GRADIENT: 6 MMHG
BH CV ECHO LEFT VENTRICLE MID CAVITARY GRADIENT: 14 MMHG
BH CV ECHO MEAS - ACS: 2.7 CM
BH CV ECHO MEAS - AI DEC SLOPE: 150 CM/SEC^2
BH CV ECHO MEAS - AI MAX PG: 55.7 MMHG
BH CV ECHO MEAS - AI MAX VEL: 373 CM/SEC
BH CV ECHO MEAS - AI P1/2T: 728.3 MSEC
BH CV ECHO MEAS - AO MAX PG (FULL): 22.8 MMHG
BH CV ECHO MEAS - AO MAX PG: 31.4 MMHG
BH CV ECHO MEAS - AO MEAN PG (FULL): 10 MMHG
BH CV ECHO MEAS - AO MEAN PG: 14 MMHG
BH CV ECHO MEAS - AO ROOT AREA (BSA CORRECTED): 1.3
BH CV ECHO MEAS - AO ROOT AREA: 9.6 CM^2
BH CV ECHO MEAS - AO ROOT DIAM: 3.5 CM
BH CV ECHO MEAS - AO V2 MAX: 280 CM/SEC
BH CV ECHO MEAS - AO V2 MEAN: 168 CM/SEC
BH CV ECHO MEAS - AO V2 VTI: 45.6 CM
BH CV ECHO MEAS - ASC AORTA: 3.6 CM
BH CV ECHO MEAS - AVA(I,A): 2.9 CM^2
BH CV ECHO MEAS - AVA(I,D): 2.9 CM^2
BH CV ECHO MEAS - AVA(V,A): 3 CM^2
BH CV ECHO MEAS - AVA(V,D): 3 CM^2
BH CV ECHO MEAS - BSA(HAYCOCK): 2.9 M^2
BH CV ECHO MEAS - BSA: 2.7 M^2
BH CV ECHO MEAS - BZI_BMI: 41.2 KILOGRAMS/M^2
BH CV ECHO MEAS - BZI_METRIC_HEIGHT: 190.5 CM
BH CV ECHO MEAS - BZI_METRIC_WEIGHT: 149.7 KG
BH CV ECHO MEAS - EDV(CUBED): 98 ML
BH CV ECHO MEAS - EDV(MOD-SP2): 75.6 ML
BH CV ECHO MEAS - EDV(MOD-SP4): 111 ML
BH CV ECHO MEAS - EDV(TEICH): 97.8 ML
BH CV ECHO MEAS - EF(CUBED): 83.5 %
BH CV ECHO MEAS - EF(MOD-SP2): 58.7 %
BH CV ECHO MEAS - EF(MOD-SP4): 65.1 %
BH CV ECHO MEAS - EF(TEICH): 76.5 %
BH CV ECHO MEAS - EPSS: 0.3 CM
BH CV ECHO MEAS - ESV(CUBED): 16.2 ML
BH CV ECHO MEAS - ESV(MOD-SP2): 31.2 ML
BH CV ECHO MEAS - ESV(MOD-SP4): 38.7 ML
BH CV ECHO MEAS - ESV(TEICH): 23 ML
BH CV ECHO MEAS - FS: 45.1 %
BH CV ECHO MEAS - IVS/LVPW: 1.4
BH CV ECHO MEAS - IVSD: 2.6 CM
BH CV ECHO MEAS - LA DIMENSION: 4.2 CM
BH CV ECHO MEAS - LA/AO: 1.2
BH CV ECHO MEAS - LV DIASTOLIC VOL/BSA (35-75): 40.9 ML/M^2
BH CV ECHO MEAS - LV MASS(C)D: 533.7 GRAMS
BH CV ECHO MEAS - LV MASS(C)DI: 196.6 GRAMS/M^2
BH CV ECHO MEAS - LV MAX PG: 8.5 MMHG
BH CV ECHO MEAS - LV MEAN PG: 4 MMHG
BH CV ECHO MEAS - LV SYSTOLIC VOL/BSA (12-30): 14.3 ML/M^2
BH CV ECHO MEAS - LV V1 MAX: 146 CM/SEC
BH CV ECHO MEAS - LV V1 MEAN: 86.4 CM/SEC
BH CV ECHO MEAS - LV V1 VTI: 23 CM
BH CV ECHO MEAS - LVIDD: 4.6 CM
BH CV ECHO MEAS - LVIDS: 2.5 CM
BH CV ECHO MEAS - LVLD AP2: 8.8 CM
BH CV ECHO MEAS - LVLD AP4: 8.8 CM
BH CV ECHO MEAS - LVLS AP2: 7.9 CM
BH CV ECHO MEAS - LVLS AP4: 7.8 CM
BH CV ECHO MEAS - LVOT AREA (M): 5.7 CM^2
BH CV ECHO MEAS - LVOT AREA: 5.7 CM^2
BH CV ECHO MEAS - LVOT DIAM: 2.7 CM
BH CV ECHO MEAS - LVPWD: 1.9 CM
BH CV ECHO MEAS - MV A MAX VEL: 61.1 CM/SEC
BH CV ECHO MEAS - MV DEC SLOPE: 667 CM/SEC^2
BH CV ECHO MEAS - MV E MAX VEL: 95.8 CM/SEC
BH CV ECHO MEAS - MV E/A: 1.6
BH CV ECHO MEAS - MV MAX PG: 3.1 MMHG
BH CV ECHO MEAS - MV MEAN PG: 1 MMHG
BH CV ECHO MEAS - MV P1/2T MAX VEL: 91.2 CM/SEC
BH CV ECHO MEAS - MV P1/2T: 40 MSEC
BH CV ECHO MEAS - MV V2 MAX: 87.6 CM/SEC
BH CV ECHO MEAS - MV V2 MEAN: 53.1 CM/SEC
BH CV ECHO MEAS - MV V2 VTI: 17.7 CM
BH CV ECHO MEAS - MVA P1/2T LCG: 2.4 CM^2
BH CV ECHO MEAS - MVA(P1/2T): 5.5 CM^2
BH CV ECHO MEAS - MVA(VTI): 7.4 CM^2
BH CV ECHO MEAS - PA MAX PG: 5.1 MMHG
BH CV ECHO MEAS - PA MEAN PG: 3 MMHG
BH CV ECHO MEAS - PA V2 MAX: 113 CM/SEC
BH CV ECHO MEAS - PA V2 MEAN: 82.7 CM/SEC
BH CV ECHO MEAS - PA V2 VTI: 22.8 CM
BH CV ECHO MEAS - RAP SYSTOLE: 3 MMHG
BH CV ECHO MEAS - RVSP: 8.7 MMHG
BH CV ECHO MEAS - SI(AO): 161.6 ML/M^2
BH CV ECHO MEAS - SI(CUBED): 30.1 ML/M^2
BH CV ECHO MEAS - SI(LVOT): 48.5 ML/M^2
BH CV ECHO MEAS - SI(MOD-SP2): 16.4 ML/M^2
BH CV ECHO MEAS - SI(MOD-SP4): 26.6 ML/M^2
BH CV ECHO MEAS - SI(TEICH): 27.6 ML/M^2
BH CV ECHO MEAS - SV(AO): 438.7 ML
BH CV ECHO MEAS - SV(CUBED): 81.8 ML
BH CV ECHO MEAS - SV(LVOT): 131.7 ML
BH CV ECHO MEAS - SV(MOD-SP2): 44.4 ML
BH CV ECHO MEAS - SV(MOD-SP4): 72.3 ML
BH CV ECHO MEAS - SV(TEICH): 74.8 ML
BH CV ECHO MEAS - TR MAX VEL: 119 CM/SEC
BH CV VAS BP LEFT ARM: NORMAL MMHG

## 2020-06-09 PROCEDURE — 94618 PULMONARY STRESS TESTING: CPT | Performed by: INTERNAL MEDICINE

## 2020-06-09 NOTE — PROGRESS NOTES
Suhail Herr Menagerard  Procedure: 6 Minute Walk Test   Indication:leal    Pretest: BP:150/84               HR:104               Sa02:97               Dyspnea:1               Fatigue:3    Post Test: BP:154/80               HR:122               Sa02:99               Dyspnea:3               Fatigue:3    First 6MWT:yes    Supplemental oxygen during test:no    Stopped before 6 minutes:no    Pauses:none    Results in distance walked:404.46 m    Did individual experience any pain or discomfort:chest,back,hip    Attestation:  I was immediately available for the above test and agree with the data.     NYHA Functional Class I     Greater than 1 m/s.      Yony Greene MD PhD    -----------------------------------------------------------------------------------------------------------------  [facility] performs 6MWT to the ATS guidelines for 6MWT (2002). Predicted distance is from:      -------------------------------------------------------------------------------------------------------------

## 2020-06-12 ENCOUNTER — OFFICE VISIT (OUTPATIENT)
Dept: CARDIOLOGY | Facility: CLINIC | Age: 45
End: 2020-06-12

## 2020-06-12 VITALS
WEIGHT: 315 LBS | OXYGEN SATURATION: 98 % | BODY MASS INDEX: 39.17 KG/M2 | HEART RATE: 104 BPM | HEIGHT: 75 IN | DIASTOLIC BLOOD PRESSURE: 84 MMHG | SYSTOLIC BLOOD PRESSURE: 138 MMHG

## 2020-06-12 DIAGNOSIS — Q23.1 BICUSPID AORTIC VALVE DETERMINED BY IMAGING: ICD-10-CM

## 2020-06-12 DIAGNOSIS — I35.1 NONRHEUMATIC AORTIC VALVE INSUFFICIENCY: ICD-10-CM

## 2020-06-12 DIAGNOSIS — I77.810 AORTIC ECTASIA, THORACIC (HCC): ICD-10-CM

## 2020-06-12 DIAGNOSIS — I42.2 HYPERTROPHIC CARDIOMYOPATHY (HCC): Primary | ICD-10-CM

## 2020-06-12 DIAGNOSIS — I47.1 PAROXYSMAL SVT (SUPRAVENTRICULAR TACHYCARDIA) (HCC): ICD-10-CM

## 2020-06-12 DIAGNOSIS — E66.01 CLASS 3 SEVERE OBESITY DUE TO EXCESS CALORIES WITHOUT SERIOUS COMORBIDITY WITH BODY MASS INDEX (BMI) OF 40.0 TO 44.9 IN ADULT (HCC): Chronic | ICD-10-CM

## 2020-06-12 PROBLEM — I71.20 THORACIC AORTIC ANEURYSM WITHOUT RUPTURE (HCC): Status: RESOLVED | Noted: 2019-11-24 | Resolved: 2020-06-12

## 2020-06-12 PROBLEM — I47.10 PAROXYSMAL SVT (SUPRAVENTRICULAR TACHYCARDIA): Status: ACTIVE | Noted: 2020-06-12

## 2020-06-12 PROCEDURE — 99214 OFFICE O/P EST MOD 30 MIN: CPT | Performed by: INTERNAL MEDICINE

## 2020-06-12 RX ORDER — METOPROLOL SUCCINATE 50 MG/1
50 TABLET, EXTENDED RELEASE ORAL DAILY
Qty: 31 TABLET | Refills: 3 | Status: SHIPPED | OUTPATIENT
Start: 2020-06-12 | End: 2020-09-17

## 2020-06-12 NOTE — PATIENT INSTRUCTIONS
Metoprolol extended-release tablets  What is this medicine?  METOPROLOL (me TOE proe lole) is a beta-blocker. Beta-blockers reduce the workload on the heart and help it to beat more regularly. This medicine is used to treat high blood pressure and to prevent chest pain. It is also used to after a heart attack and to prevent an additional heart attack from occurring.  This medicine may be used for other purposes; ask your health care provider or pharmacist if you have questions.  COMMON BRAND NAME(S): toprol, Toprol XL  What should I tell my health care provider before I take this medicine?  They need to know if you have any of these conditions:  · diabetes  · heart or vessel disease like slow heart rate, worsening heart failure, heart block, sick sinus syndrome or Raynaud's disease  · kidney disease  · liver disease  · lung or breathing disease, like asthma or emphysema  · pheochromocytoma  · thyroid disease  · an unusual or allergic reaction to metoprolol, other beta-blockers, medicines, foods, dyes, or preservatives  · pregnant or trying to get pregnant  · breast-feeding  How should I use this medicine?  Take this medicine by mouth with a glass of water. Follow the directions on the prescription label. Do not crush or chew. Take this medicine with or immediately after meals. Take your doses at regular intervals. Do not take more medicine than directed. Do not stop taking this medicine suddenly. This could lead to serious heart-related effects.  Talk to your pediatrician regarding the use of this medicine in children. While this drug may be prescribed for children as young as 6 years for selected conditions, precautions do apply.  Overdosage: If you think you have taken too much of this medicine contact a poison control center or emergency room at once.  NOTE: This medicine is only for you. Do not share this medicine with others.  What if I miss a dose?  If you miss a dose, take it as soon as you can. If it is  almost time for your next dose, take only that dose. Do not take double or extra doses.  What may interact with this medicine?  This medicine may interact with the following medications:  · certain medicines for blood pressure, heart disease, irregular heart beat  · certain medicines for depression, like monoamine oxidase (MAO) inhibitors, fluoxetine, or paroxetine  · clonidine  · dobutamine  · epinephrine  · isoproterenol  · reserpine  This list may not describe all possible interactions. Give your health care provider a list of all the medicines, herbs, non-prescription drugs, or dietary supplements you use. Also tell them if you smoke, drink alcohol, or use illegal drugs. Some items may interact with your medicine.  What should I watch for while using this medicine?  Visit your doctor or health care professional for regular check ups. Contact your doctor right away if your symptoms worsen. Check your blood pressure and pulse rate regularly. Ask your health care professional what your blood pressure and pulse rate should be, and when you should contact them.  You may get drowsy or dizzy. Do not drive, use machinery, or do anything that needs mental alertness until you know how this medicine affects you. Do not sit or stand up quickly, especially if you are an older patient. This reduces the risk of dizzy or fainting spells. Contact your doctor if these symptoms continue. Alcohol may interfere with the effect of this medicine. Avoid alcoholic drinks.  This medicine may increase blood sugar. Ask your healthcare provider if changes in diet or medicines are needed if you have diabetes.  What side effects may I notice from receiving this medicine?  Side effects that you should report to your doctor or health care professional as soon as possible:  · allergic reactions like skin rash, itching or hives  · cold or numb hands or feet  · depression  · difficulty breathing  · faint  · fever with sore throat  · irregular  heartbeat, chest pain  · rapid weight gain  ·   signs and symptoms of high blood sugar such as being more thirsty or hungry or having to urinate more than normal. You may also feel very tired or have blurry vision.  · swollen legs or ankles  Side effects that usually do not require medical attention (report to your doctor or health care professional if they continue or are bothersome):  · anxiety or nervousness  · change in sex drive or performance  · dry skin  · headache  · nightmares or trouble sleeping  · short term memory loss  · stomach upset or diarrhea  This list may not describe all possible side effects. Call your doctor for medical advice about side effects. You may report side effects to FDA at 6-347-SZG-0330.  Where should I keep my medicine?  Keep out of the reach of children.  Store at room temperature between 15 and 30 degrees C (59 and 86 degrees F). Throw away any unused medicine after the expiration date.  NOTE: This sheet is a summary. It may not cover all possible information. If you have questions about this medicine, talk to your doctor, pharmacist, or health care provider.  © 2020 [employer] (2019-10-08 11:09:41)

## 2020-06-12 NOTE — PROGRESS NOTES
Cardiovascular Medicine      Yony Greene M.D., Ph.D., [facility]             History of Present Illness  This is a 44 y.o. male with:    1.  HCM  A. CMRI with slight LGE and asymmetric septal hypertrophy  B. Normal HD response to DSE  C. ZIO with NO NSVT  2. Ectasia of TAA, 4.1 cm  3. AI  4. Possible BAV  5. Obesity      Suhail Gamino is a 44 y.o. male who returns to clinic today.        He was found to have marked LVH on EKG without a significant history of hypertension.  I was concerned for possible hypertrophic cardiomyopathy so he was referred for a 2-D echocardiogram.  Unfortunately, this shows marked LVH without evidence of obstruction.  He had severe asymmetric septal hypertrophy.   No increased gradients.  He's had some mild dyspnea on exertion, but no resting, exertional or nocturnal angina.  He's had increased cardiac awareness which he characterizes as palpitations.  There is no family history of HCM or SCA.  He was also found have ectasia of his thoracic aorta at 3.7 cm by echo.  He had a DSE which showed no evidence of inducible ischemia.  He had a normal hemodynamic response to dobutamine.  He had a ZIO which showed no evidence of NSVT.  He was sent for cardiac MRI which is consistent with hypertrophic cardiomyopathy with very slight LGE in the septum.  It was also concerning for a bicuspid aortic valve.  Patient's ascending aorta was dilated at 4.1 cm.  He returns today after a recent echocardiogram.  This showed stable septal asymmetry consistent with his known hypertrophic cardiomyopathy.  He had no significant gradients.  At his last appointment he was endorsing increased social stressors.  He had recently lost his job.  He was having some atypical chest pain.  I did discuss with him that this is likely related to stress and not cardiac.  However, some patients with hypertrophic cardiomyopathy get epicardial bridging.  He had a CTCA.     Since his last visit he called for an  "earlier appointment.  We had a telephone visit.  He was complaining of increased cardiac awareness.  I sent him for a TST, a TTE and a ZIO.  His TTE showed stable dimensions with LVOT gradient at rest 14 mmHg.  TST showed no evidence of exercise-induced arrhythmias.  His ZIO showed normal burden of PACs and PVCs.  Today, he is primarily complaining of exertional dyspnea.  He denies dyspnea at rest.  No resting, exertional or nocturnal angina.  He states that he stopped taking meloxicam and has had a vast improvement in his increased cardiac awareness.  He is also complaining of an unusual sensation that typically occurs when he first stands up to walk where he feels that he has a \"fullness\" in his head.     Review of Systems - Review of Systems   Cardiovascular: Positive for dyspnea on exertion. Negative for chest pain, claudication, cyanosis, irregular heartbeat, leg swelling, near-syncope, orthopnea, palpitations, paroxysmal nocturnal dyspnea and syncope.   Respiratory: Positive for shortness of breath. Negative for cough, hemoptysis, sleep disturbances due to breathing, snoring, sputum production and wheezing.         All other systems were reviewed and were negative.    family history includes Cancer in his paternal grandfather; Hyperlipidemia in his father; Hypertension in his father; No Known Problems in his daughter, daughter, mother, sister, sister, sister, son, and son.     reports that he quit smoking about 20 years ago. His smoking use included cigarettes. He started smoking about 23 years ago. He has a 6.00 pack-year smoking history. His smokeless tobacco use includes snuff. He reports that he does not drink alcohol or use drugs.    No Known Allergies      Current Outpatient Medications:   •  ALLERGY RELIEF 180 MG tablet, TAKE 1 TABLET BY MOUTH DAILY, Disp: 30 tablet, Rfl: 5  •  busPIRone (BUSPAR) 10 MG tablet, Take 1 tablet by mouth 2 (Two) Times a Day. (Patient taking differently: Take 10 mg by mouth 2 " (Two) Times a Day. prn), Disp: 60 tablet, Rfl: 5  •  cyclobenzaprine (FLEXERIL) 10 MG tablet, TAKE 1 TABLET BY MOUTH THREE TIMES A DAY AS NEEDED FOR MUSCLE SPASMS, Disp: 60 tablet, Rfl: 5  •  meloxicam (MOBIC) 15 MG tablet, TAKE 1 TABLET BY MOUTH DAILY, Disp: 30 tablet, Rfl: 5  •  metoprolol succinate XL (TOPROL-XL) 50 MG 24 hr tablet, Take 1 tablet by mouth As Needed (Take one tablet the night before the CT scan and one tablet the morning of the test)., Disp: 2 tablet, Rfl: 0    Physical Exam:    Vitals:    06/12/20 0834   BP: 138/84   Pulse: 104   SpO2: 98%     Body mass index is 44.12 kg/m².   Pulse Ox: Normal  on room air  General: alert, appears stated age and cooperative     Body Habitus: well-nourished    HEENT: Head: Normocephalic, no lesions, without obvious abnormality. No arcus senilis, xanthelasma or xanthomas.    JVP: Volume/Pulsation: Normal  Carotid Exam: no bruit normal pulsation bilaterally   Carotid Volume: normal    Subclavian Bruit: absent  Vertebral Bruit: absent  Respirations: no increased work of breathing   Chest:  Normal    Pulmonary:Normal     Precordium: Normal impulses. P2 is not palpable.   [place]:  normal size and placement Palpable S4: absent.  Heart rate: tachycardic    Heart Rhythm: regular     Heart Sounds: S1: normal  S2: normal intensity  S3: absent   S4: absent  Opening Snap: absent  A2-OS:  absent.   Pericardial rub: absent    Ejection click: None      Murmurs:  present - II/VI SAW at base   Extremity: moves all extremities equally.         DATA REVIEWED:     Results for orders placed in visit on 04/29/20   Adult Transthoracic Echo Complete W/ Cont if Necessary Per Protocol    Narrative · Technically difficult study secondary to body habitus.  · Left ventricle systolic function is normal at 61-65%. Severe asymmetric   septal hypertrophy consistent with hypertrophic cardiomyopathy.  · Left ventricular intracavitary gradient of 14 mmHg. Elevated aortic   velocity is likely from flow  acceleration.  · Trace to mild aortic regurgitation. The aortic valve is not well   visualized.  · Tubular ascending aorta is dilated at 4.0 cm. The patient's ASI is 1.4   cm/meter squared.  · Trivial pericardial effusion.  · If clinically indicated, a transesophageal echocardiogram can evaluate   aortic velocities and aortic valve further.                          --------------------------------------------------------------------------------------------------  LABS:   Lab Results   Component Value Date    GLUCOSE 76 03/30/2020    BUN 11 03/30/2020    CREATININE 0.83 03/30/2020    EGFRIFNONA 102 03/30/2020    BCR 13.3 03/30/2020    K 4.2 03/30/2020    CO2 26.0 03/30/2020    CALCIUM 9.6 03/30/2020    ALBUMIN 3.90 03/30/2020    AST 21 03/30/2020    ALT 17 03/30/2020     Lab Results   Component Value Date    WBC 9.91 03/30/2020    HGB 16.6 03/30/2020    HCT 48.9 03/30/2020    MCV 84.9 03/30/2020     03/30/2020     Lab Results   Component Value Date    CHOL 163 04/29/2019    TRIG 47 04/29/2019    HDL 52 04/29/2019     (H) 04/29/2019     Lab Results   Component Value Date    TSH 2.060 04/29/2019     Lab Results   Component Value Date    TROPONINT <0.010 03/30/2020     No results found for: HGBA1C  No results found for: DDIMER  Lab Results   Component Value Date    ALT 17 03/30/2020     No results found for: HGBA1C  Lab Results   Component Value Date    CREATININE 0.83 03/30/2020     No results found for: IRON, TIBC, FERRITIN  No results found for: INR, PROTIME    Assessment/Plan      1. Hypertrophic cardiomyopathy (CMS/HCC).  LVH is: Severe with IVS-3.9cm.  His most recent TTE showed stable asymmetric septal hypertrophy.  There are no high-risk family history.  MRI shows slight LGE isolated to the septum.  LGE.  He was complaining of palpitations unremarkable repeat ZIO.  This showed some episodes of SVT.  However, no evidence of nonsustained ventricular tachycardia.  Normal burden of PACs and PVCs.   Difficult to assess his cardiovascular symptoms in the setting of a significant anxiety disorder.  However, he is complaining of more exertional dyspnea and increased cardiac awareness.  I was unable to correlate his cardiac awareness with his monitor.  In fact, most of the times he was activating the monitor for sinus mechanism.  I think at this point would be prudent to start a beta-blocker.  I also would like to assess his exertional gradients with a dobutamine stress echo.  We also discussed repeating his MRI next year to evaluate his fibrosis status.  -Recommended 1st degree relative surveillance  -No indication for ICD.  -Yearly EKG recommended, or if clinical status changes  -Call for concerning symptoms  -Will plan on repeat 2D TTE in one year  -Worsening symptoms were discussed.  I have asked him to contact me if any of these develop.  -He does not currently meet indications for septal reduction therapy because he is asymptomatic.  -Low intensity aerobic exercise is reasonable as a part of a healthy lifestyle  -Toprol XL 50mg  -Gradient SE    2. Bicuspid aortic valve determined by imaging with nonrheumatic aortic insufficiency. ACC stage B.  There are no surgical indications at this time.  · The patient has been advised to remain in clinical surveillance every 12 months.  · Signs and symptoms of worsening valve disease discussed.  I've asked the patient contact me for an earlier appointment if these develop.  · I've recommended a repeat 2D TTE every 1 year.  · TTE due: 2021.  No indication based on 2017 ACC/AHA guidelines for IE prophylaxis for dental procedures: Optimal oral health is recommended through regular professional dental care and the use of appropriate dental products, such as manual, powered, and ultrasonic toothbrushes; dental floss; and other plaque-removal devices    3. Aortic ectasia, thoracic (CMS/HCC).  Patient's most recent TTE, 2020,  showed a tubular ascending aorta diameter of 4.0 cm.  Last documented BSA: Body surface area is 2.79 meters squared.. This provides an ASI of 1. centimeters/meter squared.  Based on the best data that we have and ASI of less than 2.0 cm/meter squared has a 5-year rupture-free survival of 97.6%.  The next marked step-up in yearly event rate occurs at above 2.75.  Additionally the  patient does not have a family history of aneurysmal disease.  Patient's aortic valve is not trileaflet.  The patient does have systemic diseases diagnosed associated with aortic aneurysms: BAV.  I again explained to the patient that they currently do not meet criteria for a diagnosis of an aneurysm.  I also explained the normal aorta grows about 0.1 cm per 10 years after the age of 25.  We also discussed issues that can lead to more rapid widening of the aorta, such as uncontrolled hypertension.  I again explained that, in contrast to aneurysms, we have little clinical trial data to guide on optimal timing for active surveillance.  -Recommended a TTE in 12 months to exclude interval dilatation  -I went over the signs and symptoms of aneurysmal dilatation.  -I've recommended clinical evaluation every 1 year.  -Next TTE due: 2021  -Importance of blood pressure control less than 130/80 emphasized  -Avoid smoking      4. Cardiac Risk Assessments based on 2019 ACCF guidelines:  A team-based care approach is recommended for the control of risk factors associated with ASCAD.  As such, Suhail Gamino was requested to have ongoing follow-up with their PCP. A PCP can provide the detailed monitoring that is required for management of risk factors. Essential HTN is a significant risk factor for stroke, heart disease and vascular disease. I've recommended the patient continue current medications, if any, as prescribed by the primary care provider. A diet emphasizing intake of vegetables, fruits, nuts, whole grains and fish is recommended. Physical activity recommendations were provided by  literature. They were also provided with information regarding maintaining a healthy weight, heart-healthy dietary pattern and DASH information.  Goal blood pressure less than 130/80.  The patient's BMI is recommended to be calculated at least annually.  The patient's BMI is Body mass index is 44.12 kg/m²..  Tobacco status is assessed at every visit based on established guidelines.  The patient's nicotine status: Social History    Tobacco Use      Smoking status: Former Smoker        Packs/day: 1.50        Years: 4.00        Pack years: 6        Types: Cigarettes        Start date: 1996        Quit date: 2000        Years since quittin.4      Smokeless tobacco: Current User        Types: Snuff    Return in about 3 months (around 2020).

## 2020-06-16 ENCOUNTER — HOSPITAL ENCOUNTER (OUTPATIENT)
Dept: CARDIOLOGY | Facility: HOSPITAL | Age: 45
Discharge: HOME | End: 2020-06-16
Admitting: INTERNAL MEDICINE

## 2020-06-16 VITALS — WEIGHT: 315 LBS | BODY MASS INDEX: 43.75 KG/M2

## 2020-06-16 LAB
BH CV ECHO LEFT VENTRICLE BASAL CAVITARY GRADIENT: 8 MMHG
BH CV ECHO MEAS - BSA(HAYCOCK): 3 M^2
BH CV ECHO MEAS - BSA: 2.8 M^2
BH CV ECHO MEAS - BZI_BMI: 44.1 KILOGRAMS/M^2
BH CV ECHO MEAS - BZI_METRIC_HEIGHT: 190.5 CM
BH CV ECHO MEAS - BZI_METRIC_WEIGHT: 160.1 KG
BH CV ECHO MEAS - LV MAX PG: 39.9 MMHG
BH CV ECHO MEAS - LV MEAN PG: 18 MMHG
BH CV ECHO MEAS - LV V1 MAX: 316 CM/SEC
BH CV ECHO MEAS - LV V1 MEAN: 206 CM/SEC
BH CV ECHO MEAS - LV V1 VTI: 39.9 CM
BH CV STRESS BP STAGE 1: NORMAL
BH CV STRESS BP STAGE 2: NORMAL
BH CV STRESS DOSE DOBUTAMINE STAGE 1: 10
BH CV STRESS DOSE DOBUTAMINE STAGE 2: 20
BH CV STRESS DOSE DOBUTAMINE STAGE 3: 30
BH CV STRESS DURATION MIN STAGE 1: 3
BH CV STRESS DURATION MIN STAGE 2: 3
BH CV STRESS DURATION MIN STAGE 3: 2
BH CV STRESS DURATION SEC STAGE 1: 0
BH CV STRESS DURATION SEC STAGE 2: 0
BH CV STRESS DURATION SEC STAGE 3: 46
BH CV STRESS HR STAGE 1: 115
BH CV STRESS HR STAGE 2: 130
BH CV STRESS HR STAGE 3: 139
BH CV STRESS PROTOCOL 1: NORMAL
BH CV STRESS RECOVERY BP: NORMAL MMHG
BH CV STRESS RECOVERY HR: 100 BPM
BH CV STRESS STAGE 1: 1
BH CV STRESS STAGE 2: 2
BH CV STRESS STAGE 3: 3
MAXIMAL PREDICTED HEART RATE: 178 BPM
PERCENT MAX PREDICTED HR: 78.09 %
STRESS BASELINE BP: NORMAL MMHG
STRESS BASELINE HR: 98 BPM
STRESS PERCENT HR: 92 %
STRESS POST ESTIMATED WORKLOAD: 1 METS
STRESS POST EXERCISE DUR MIN: 8 MIN
STRESS POST EXERCISE DUR SEC: 45 SEC
STRESS POST PEAK BP: NORMAL MMHG
STRESS POST PEAK HR: 139 BPM
STRESS TARGET HR: 151 BPM

## 2020-06-16 PROCEDURE — 25010000002 DOBUTAMINE: Performed by: INTERNAL MEDICINE

## 2020-06-16 PROCEDURE — 25010000002 PERFLUTREN (DEFINITY) 8.476 MG IN SODIUM CHLORIDE 0.9 % 10 ML INJECTION: Performed by: INTERNAL MEDICINE

## 2020-06-16 PROCEDURE — 93321 DOPPLER ECHO F-UP/LMTD STD: CPT

## 2020-06-16 PROCEDURE — 93352 ADMIN ECG CONTRAST AGENT: CPT | Performed by: INTERNAL MEDICINE

## 2020-06-16 PROCEDURE — 93325 DOPPLER ECHO COLOR FLOW MAPG: CPT

## 2020-06-16 PROCEDURE — 93351 STRESS TTE COMPLETE: CPT | Performed by: INTERNAL MEDICINE

## 2020-06-16 PROCEDURE — 93350 STRESS TTE ONLY: CPT

## 2020-06-16 PROCEDURE — 93017 CV STRESS TEST TRACING ONLY: CPT

## 2020-06-16 RX ADMIN — DOBUTAMINE 10 MCG/KG/MIN: 12.5 INJECTION, SOLUTION, CONCENTRATE INTRAVENOUS at 12:08

## 2020-06-16 RX ADMIN — PERFLUTREN 2 ML: 6.52 INJECTION, SUSPENSION INTRAVENOUS at 12:44

## 2020-08-20 ENCOUNTER — OFFICE VISIT (OUTPATIENT)
Dept: FAMILY MEDICINE CLINIC | Facility: CLINIC | Age: 45
End: 2020-08-20

## 2020-08-20 VITALS
SYSTOLIC BLOOD PRESSURE: 130 MMHG | HEIGHT: 75 IN | TEMPERATURE: 98.6 F | HEART RATE: 74 BPM | BODY MASS INDEX: 39.17 KG/M2 | OXYGEN SATURATION: 99 % | WEIGHT: 315 LBS | DIASTOLIC BLOOD PRESSURE: 78 MMHG

## 2020-08-20 DIAGNOSIS — I47.1 PAROXYSMAL SVT (SUPRAVENTRICULAR TACHYCARDIA) (HCC): Chronic | ICD-10-CM

## 2020-08-20 DIAGNOSIS — I42.2 HYPERTROPHIC CARDIOMYOPATHY (HCC): Chronic | ICD-10-CM

## 2020-08-20 DIAGNOSIS — M15.9 PRIMARY OSTEOARTHRITIS INVOLVING MULTIPLE JOINTS: Chronic | ICD-10-CM

## 2020-08-20 DIAGNOSIS — M1A.0710 IDIOPATHIC CHRONIC GOUT OF RIGHT FOOT WITHOUT TOPHUS: Primary | Chronic | ICD-10-CM

## 2020-08-20 DIAGNOSIS — R53.83 OTHER FATIGUE: ICD-10-CM

## 2020-08-20 PROBLEM — M10.071 ACUTE IDIOPATHIC GOUT OF RIGHT FOOT: Chronic | Status: ACTIVE | Noted: 2020-08-20

## 2020-08-20 PROBLEM — I47.10 PAROXYSMAL SVT (SUPRAVENTRICULAR TACHYCARDIA): Chronic | Status: ACTIVE | Noted: 2020-06-12

## 2020-08-20 PROCEDURE — 99214 OFFICE O/P EST MOD 30 MIN: CPT | Performed by: INTERNAL MEDICINE

## 2020-08-20 RX ORDER — COLCHICINE 0.6 MG/1
TABLET ORAL
COMMUNITY
Start: 2020-08-11 | End: 2020-08-20

## 2020-08-20 NOTE — PROGRESS NOTES
Chief Complaint   Patient presents with   • needs med     patient needs to discuss med    • Fatigue     patient states that he is feeling more tired,patient is not sleeping well at night        Answers for HPI/ROS submitted by the patient on 8/13/2020   What is the primary reason for your visit?: Other  Please describe your symptoms.: Gout flare in right ankle  Have you had these symptoms before?: No  How long have you been having these symptoms?: 1-4 days    Subjective   Suhail Gamino is a 45 y.o. male who presents to the office for follow-up.  He has gout which flares up at times.  He had a gout flareup in his right ankle a couple of weeks ago.  He was seen at the urgent care with [facility].  This was treated with colchicine.  He had side effects from the colchicine, and added this to his allergy list.  He was then treated with Depo-Medrol, and this did well for him.  His gout symptoms have now resolved.  He has osteoarthritis which causes low back pain.  He takes an over-the-counter anti-inflammatory as needed.  He also takes Flexeril as needed for muscle spasm.  He has been seeing a chiropractor, but continues to have increased pain in his back.  He has taken diclofenac and meloxicam in the past, but both of these caused discomfort in his chest.  He is asking what other medications he can take for the arthritic pain.  He has hypertrophic cardiomyopathy and paroxysmal SVT.  He takes metoprolol daily, and this has been working well for him.    He complains of increased fatigue.  He reports that he struggles to make it through a full day at work.  He denies any anxiety or depression.  He feels as if he is not sleeping well at night.    The following portions of the patient's history were reviewed and updated as appropriate: allergies, current medications, past family history, past medical history, past social history, past surgical history and problem list.    Review of Systems  "  Constitutional: Positive for fatigue. Negative for chills, fever and weight loss.   HENT: Negative for congestion, sneezing, sore throat and trouble swallowing.    Eyes: Negative for visual disturbance.   Respiratory: Negative for cough, chest tightness, shortness of breath and wheezing.    Cardiovascular: Negative for chest pain, palpitations and leg swelling.   Gastrointestinal: Negative for abdominal pain, bowel incontinence, constipation, diarrhea, nausea and vomiting.   Genitourinary: Negative for bladder incontinence, dysuria, frequency, pelvic pain and urgency.   Musculoskeletal: Positive for arthralgias and back pain. Negative for neck pain.   Skin: Negative for rash.   Neurological: Positive for tingling and paresthesias. Negative for dizziness.   Psychiatric/Behavioral:        Patient denies any feelings of depression and has not felt down, hopeless or lost interest in any activities.   All other systems reviewed and are negative.      Objective   Vitals:    08/20/20 0826   BP: 130/78   Pulse: 74   Temp: 98.6 °F (37 °C)   TempSrc: Oral   SpO2: 99%   Weight: (!) 159 kg (350 lb)   Height: 190.5 cm (75\")   PainSc: 0-No pain   PainLoc: Generalized      Body mass index is 43.75 kg/m².  Physical Exam   Constitutional: He is oriented to person, place, and time. He appears well-developed and well-nourished. No distress.   HENT:   Head: Normocephalic and atraumatic.   Nose: Nose normal.   Mouth/Throat: Oropharynx is clear and moist. No oropharyngeal exudate.   Eyes: Pupils are equal, round, and reactive to light. Conjunctivae and EOM are normal. No scleral icterus.   Neck: Normal range of motion. Neck supple.   Cardiovascular: Normal rate, regular rhythm and normal heart sounds. Exam reveals no gallop and no friction rub.   No murmur heard.  Pulmonary/Chest: Effort normal and breath sounds normal. No respiratory distress. He has no wheezes. He has no rales.   Abdominal: Soft. Bowel sounds are normal. He exhibits " no distension. There is no tenderness. There is no rebound and no guarding.   Musculoskeletal: Normal range of motion. He exhibits no edema.   Lymphadenopathy:     He has no cervical adenopathy.   Neurological: He is alert and oriented to person, place, and time. No cranial nerve deficit.   Skin: Skin is warm and dry. No rash noted.   Psychiatric: He has a normal mood and affect. His behavior is normal. Judgment and thought content normal.   Nursing note and vitals reviewed.      Assessment/Plan   Suhail was seen today for needs med and fatigue.    Diagnoses and all orders for this visit:    Idiopathic chronic gout of right foot without tophus  -     CBC & Differential; Future  -     Comprehensive Metabolic Panel; Future    Other fatigue  -     CBC & Differential; Future  -     Comprehensive Metabolic Panel; Future  -     TSH; Future  -     T4, Free; Future  -     Vitamin D 25 Hydroxy; Future  -     Testosterone (Free & Total), LC / MS; Future    Primary osteoarthritis involving multiple joints    Paroxysmal SVT (supraventricular tachycardia) (CMS/HCC)    Hypertrophic cardiomyopathy (CMS/HCC)  -     Lipid Panel; Future      His gout flareup has resolved, so no further treatment is needed at this time.  I did inform him that if he starts having frequent gout flareups, we can always try a daily prophylactic medication for this.  He may use Tylenol as needed for his osteoarthritis.  I have informed him not to exceed 3 g daily on the Tylenol dose.  He will also continue to use Flexeril as needed for muscle spasm.      I will check labs for evaluation of the fatigue symptoms which he is experiencing.  I will follow-up with him once I have these results.  If his lab results are normal, he will then need referral to sleep medicine for consult.  His sleep quality could be contributing to his daytime fatigue.    He will follow-up with cardiology as scheduled for ongoing management of the hypertrophic cardiomyopathy and  paroxysmal SVT.  He will continue with the current dose of metoprolol.    Patient's Body mass index is 43.75 kg/m². BMI is above normal parameters. Recommendations include: exercise counseling and nutrition counseling.           PHQ-2/PHQ-9 Depression Screening 8/20/2020   Little interest or pleasure in doing things 0   Feeling down, depressed, or hopeless 0   Trouble falling or staying asleep, or sleeping too much 3   Feeling tired or having little energy 3   Poor appetite or overeating 0   Feeling bad about yourself - or that you are a failure or have let yourself or your family down 0   Trouble concentrating on things, such as reading the newspaper or watching television 2   Moving or speaking so slowly that other people could have noticed. Or the opposite - being so fidgety or restless that you have been moving around a lot more than usual 0   Thoughts that you would be better off dead, or of hurting yourself in some way 0   Total Score 8   If you checked off any problems, how difficult have these problems made it for you to do your work, take care of things at home, or get along with other people? Very difficult

## 2020-08-28 ENCOUNTER — LAB (OUTPATIENT)
Dept: LAB | Facility: OTHER | Age: 45
End: 2020-08-28

## 2020-08-28 DIAGNOSIS — M1A.0710 IDIOPATHIC CHRONIC GOUT OF RIGHT FOOT WITHOUT TOPHUS: ICD-10-CM

## 2020-08-28 DIAGNOSIS — I42.2 HYPERTROPHIC CARDIOMYOPATHY (HCC): Chronic | ICD-10-CM

## 2020-08-28 DIAGNOSIS — R53.83 OTHER FATIGUE: ICD-10-CM

## 2020-08-28 LAB
25(OH)D3 SERPL-MCNC: 33.3 NG/ML (ref 30–100)
ALBUMIN SERPL-MCNC: 4.3 G/DL (ref 3.5–5)
ALBUMIN/GLOB SERPL: 1.1 G/DL (ref 1.1–1.8)
ALP SERPL-CCNC: 89 U/L (ref 38–126)
ALT SERPL W P-5'-P-CCNC: 21 U/L
ANION GAP SERPL CALCULATED.3IONS-SCNC: 7 MMOL/L (ref 5–15)
AST SERPL-CCNC: 27 U/L (ref 17–59)
BASOPHILS # BLD AUTO: 0.06 10*3/MM3 (ref 0–0.2)
BASOPHILS NFR BLD AUTO: 0.6 % (ref 0–1.5)
BILIRUB SERPL-MCNC: 0.7 MG/DL (ref 0.2–1.3)
BUN SERPL-MCNC: 14 MG/DL (ref 7–23)
BUN/CREAT SERPL: 16.9 (ref 7–25)
CALCIUM SPEC-SCNC: 9.2 MG/DL (ref 8.4–10.2)
CHLORIDE SERPL-SCNC: 103 MMOL/L (ref 101–112)
CHOLEST SERPL-MCNC: 179 MG/DL (ref 150–200)
CO2 SERPL-SCNC: 29 MMOL/L (ref 22–30)
CREAT SERPL-MCNC: 0.83 MG/DL (ref 0.7–1.3)
DEPRECATED RDW RBC AUTO: 44.8 FL (ref 37–54)
EOSINOPHIL # BLD AUTO: 0.14 10*3/MM3 (ref 0–0.4)
EOSINOPHIL NFR BLD AUTO: 1.4 % (ref 0.3–6.2)
ERYTHROCYTE [DISTWIDTH] IN BLOOD BY AUTOMATED COUNT: 14.1 % (ref 12.3–15.4)
GFR SERPL CREATININE-BSD FRML MDRD: 101 ML/MIN/1.73 (ref 63–147)
GLOBULIN UR ELPH-MCNC: 3.8 GM/DL (ref 2.3–3.5)
GLUCOSE SERPL-MCNC: 89 MG/DL (ref 70–99)
HCT VFR BLD AUTO: 50.7 % (ref 37.5–51)
HDLC SERPL-MCNC: 50 MG/DL (ref 40–59)
HGB BLD-MCNC: 16.7 G/DL (ref 13–17.7)
LDLC SERPL CALC-MCNC: 109 MG/DL
LDLC/HDLC SERPL: 2.18 {RATIO} (ref 0–3.55)
LYMPHOCYTES # BLD AUTO: 2.44 10*3/MM3 (ref 0.7–3.1)
LYMPHOCYTES NFR BLD AUTO: 25 % (ref 19.6–45.3)
MCH RBC QN AUTO: 29.1 PG (ref 26.6–33)
MCHC RBC AUTO-ENTMCNC: 32.9 G/DL (ref 31.5–35.7)
MCV RBC AUTO: 88.5 FL (ref 79–97)
MONOCYTES # BLD AUTO: 0.81 10*3/MM3 (ref 0.1–0.9)
MONOCYTES NFR BLD AUTO: 8.3 % (ref 5–12)
NEUTROPHILS NFR BLD AUTO: 6.3 10*3/MM3 (ref 1.7–7)
NEUTROPHILS NFR BLD AUTO: 64.7 % (ref 42.7–76)
PLATELET # BLD AUTO: 249 10*3/MM3 (ref 140–450)
PMV BLD AUTO: 9.9 FL (ref 6–12)
POTASSIUM SERPL-SCNC: 4.4 MMOL/L (ref 3.4–5)
PROT SERPL-MCNC: 8.1 G/DL (ref 6.3–8.6)
RBC # BLD AUTO: 5.73 10*6/MM3 (ref 4.14–5.8)
SODIUM SERPL-SCNC: 139 MMOL/L (ref 137–145)
T4 FREE SERPL-MCNC: 1.13 NG/DL (ref 0.93–1.7)
TRIGL SERPL-MCNC: 100 MG/DL
TSH SERPL DL<=0.05 MIU/L-ACNC: 1.68 UIU/ML (ref 0.27–4.2)
VLDLC SERPL-MCNC: 20 MG/DL
WBC # BLD AUTO: 9.75 10*3/MM3 (ref 3.4–10.8)

## 2020-08-28 PROCEDURE — 80050 GENERAL HEALTH PANEL: CPT | Performed by: INTERNAL MEDICINE

## 2020-08-28 PROCEDURE — 82306 VITAMIN D 25 HYDROXY: CPT | Performed by: INTERNAL MEDICINE

## 2020-08-28 PROCEDURE — 84402 ASSAY OF FREE TESTOSTERONE: CPT | Performed by: INTERNAL MEDICINE

## 2020-08-28 PROCEDURE — 84439 ASSAY OF FREE THYROXINE: CPT | Performed by: INTERNAL MEDICINE

## 2020-08-28 PROCEDURE — 84403 ASSAY OF TOTAL TESTOSTERONE: CPT | Performed by: INTERNAL MEDICINE

## 2020-08-28 PROCEDURE — 80061 LIPID PANEL: CPT | Performed by: INTERNAL MEDICINE

## 2020-09-01 LAB
TESTOST FREE SERPL-MCNC: 6.7 PG/ML (ref 6.8–21.5)
TESTOST SERPL-MCNC: 620.5 NG/DL (ref 264–916)

## 2020-09-09 DIAGNOSIS — M16.51 POST-TRAUMATIC OSTEOARTHRITIS OF RIGHT HIP: Chronic | ICD-10-CM

## 2020-09-10 RX ORDER — CYCLOBENZAPRINE HCL 10 MG
TABLET ORAL
Qty: 60 TABLET | Refills: 5 | Status: SHIPPED | OUTPATIENT
Start: 2020-09-10 | End: 2021-06-08

## 2020-09-17 RX ORDER — METOPROLOL SUCCINATE 50 MG/1
50 TABLET, EXTENDED RELEASE ORAL DAILY
Qty: 30 TABLET | Refills: 3 | Status: SHIPPED | OUTPATIENT
Start: 2020-09-17 | End: 2020-09-21

## 2020-09-21 ENCOUNTER — OFFICE VISIT (OUTPATIENT)
Dept: CARDIOLOGY | Facility: CLINIC | Age: 45
End: 2020-09-21

## 2020-09-21 VITALS
DIASTOLIC BLOOD PRESSURE: 84 MMHG | HEART RATE: 80 BPM | WEIGHT: 315 LBS | SYSTOLIC BLOOD PRESSURE: 126 MMHG | OXYGEN SATURATION: 100 % | BODY MASS INDEX: 39.17 KG/M2 | HEIGHT: 75 IN

## 2020-09-21 DIAGNOSIS — Q23.1 BICUSPID AORTIC VALVE DETERMINED BY IMAGING: ICD-10-CM

## 2020-09-21 DIAGNOSIS — I35.1 NONRHEUMATIC AORTIC VALVE INSUFFICIENCY: ICD-10-CM

## 2020-09-21 DIAGNOSIS — I77.810 AORTIC ECTASIA, THORACIC (HCC): ICD-10-CM

## 2020-09-21 DIAGNOSIS — I42.2 HYPERTROPHIC CARDIOMYOPATHY (HCC): Primary | Chronic | ICD-10-CM

## 2020-09-21 DIAGNOSIS — J30.1 CHRONIC SEASONAL ALLERGIC RHINITIS DUE TO POLLEN: Chronic | ICD-10-CM

## 2020-09-21 DIAGNOSIS — E66.01 CLASS 3 SEVERE OBESITY DUE TO EXCESS CALORIES WITHOUT SERIOUS COMORBIDITY WITH BODY MASS INDEX (BMI) OF 40.0 TO 44.9 IN ADULT (HCC): Chronic | ICD-10-CM

## 2020-09-21 PROCEDURE — 99214 OFFICE O/P EST MOD 30 MIN: CPT | Performed by: INTERNAL MEDICINE

## 2020-09-21 RX ORDER — DILTIAZEM HYDROCHLORIDE 120 MG/1
120 CAPSULE, EXTENDED RELEASE ORAL DAILY
Qty: 30 CAPSULE | Refills: 11 | Status: SHIPPED | OUTPATIENT
Start: 2020-09-21 | End: 2020-10-16 | Stop reason: SINTOL

## 2020-09-21 RX ORDER — FEXOFENADINE HCL 180 MG/1
180 TABLET ORAL DAILY
Qty: 30 TABLET | Refills: 5 | Status: SHIPPED | OUTPATIENT
Start: 2020-09-21 | End: 2021-04-02

## 2020-09-21 NOTE — PROGRESS NOTES
Cardiovascular Medicine      Yony Greene M.D., Ph.D., [facility]             This is a 45 y.o. male with:    1.  HCM  A. CMRI with slight LGE and asymmetric septal hypertrophy  B. Normal HD response to DSE  C. ZIO with NO NSVT  2. Ectasia of TAA, 4.1 cm  3. AI  4. Possible BAV  5. Obesity      Suhail Gamino is a 45 y.o. male who returns to clinic today.        He was found to have marked LVH on EKG without a significant history of hypertension.  I was concerned for possible hypertrophic cardiomyopathy so he was referred for a 2-D echocardiogram.  Unfortunately, this shows marked LVH without evidence of obstruction.  He had severe asymmetric septal hypertrophy.   No increased gradients.  He's had some mild dyspnea on exertion, but no resting, exertional or nocturnal angina.  He's had increased cardiac awareness which he characterizes as palpitations.  There is no family history of HCM or SCA.  He was also found have ectasia of his thoracic aorta at 3.7 cm by echo.  He had a DSE which showed no evidence of inducible ischemia.  He had a normal hemodynamic response to dobutamine.  He had a ZIO which showed no evidence of NSVT.  He was sent for cardiac MRI which is consistent with hypertrophic cardiomyopathy with very slight LGE in the septum.  It was also concerning for a bicuspid aortic valve.  Patient's ascending aorta was dilated at 4.1 cm.  He returns today after a recent echocardiogram.  This showed stable septal asymmetry consistent with his known hypertrophic cardiomyopathy.  He had no significant gradients.  At his last appointment he was endorsing increased social stressors.  He had recently lost his job.  He was having some atypical chest pain.  I did discuss with him that this is likely related to stress and not cardiac.  However, some patients with hypertrophic cardiomyopathy get epicardial bridging.  He had a CTCA.  He then called for an earlier appointment because of increased cardiac  awareness.  His TTE showed stable dimensions with LVOT gradient at rest 14 mmHg.  TST showed no evidence of exercise-induced arrhythmias.  His ZIO showed normal burden of PACs and PVCs.  His stress echo showed no evidence of worsening gradients.  He was started on Toprol-XL.  Today, he returns in follow-up.  He reports that he has had excellent cardiovascular effects, but he is fatigued, tired and desires to change medications.    Review of Systems - Review of Systems   Cardiovascular: Positive for dyspnea on exertion. Negative for chest pain, claudication, cyanosis, irregular heartbeat, leg swelling, near-syncope, orthopnea, palpitations, paroxysmal nocturnal dyspnea and syncope.   Respiratory: Positive for shortness of breath. Negative for cough, hemoptysis, sleep disturbances due to breathing, snoring, sputum production and wheezing.         All other systems were reviewed and were negative.    family history includes Cancer in his paternal grandfather; Hyperlipidemia in his father; Hypertension in his father; No Known Problems in his daughter, daughter, mother, sister, sister, sister, son, and son.     reports that he quit smoking about 20 years ago. His smoking use included cigarettes. He started smoking about 24 years ago. He has a 6.00 pack-year smoking history. His smokeless tobacco use includes snuff. He reports that he does not drink alcohol or use drugs.    Allergies   Allergen Reactions   • Colcrys [Colchicine] Hives         Current Outpatient Medications:   •  ALLERGY RELIEF 180 MG tablet, TAKE 1 TABLET BY MOUTH DAILY, Disp: 30 tablet, Rfl: 5  •  cyclobenzaprine (FLEXERIL) 10 MG tablet, TAKE 1 TABLET BY MOUTH THREE TIMES A DAY AS NEEDED FOR MUSCLE SPASMS, Disp: 60 tablet, Rfl: 5  •  metoprolol succinate XL (TOPROL-XL) 50 MG 24 hr tablet, TAKE 1 TABLET BY MOUTH DAILY, Disp: 30 tablet, Rfl: 3    Physical Exam:    Vitals:    09/21/20 0952   BP: 126/84   Pulse: 80   SpO2: 100%     Body mass index is 44.1  kg/m².   Pulse Ox: Normal  on room air  General: alert, appears stated age and cooperative     Body Habitus: well-nourished    HEENT: Head: Normocephalic, no lesions, without obvious abnormality. No arcus senilis, xanthelasma or xanthomas.    JVP: Volume/Pulsation: Normal  Carotid Exam: no bruit normal pulsation bilaterally   Carotid Volume: normal    Subclavian Bruit: absent  Vertebral Bruit: absent  Respirations: no increased work of breathing   Chest:  Normal    Pulmonary:Normal     Precordium: Normal impulses. P2 is not palpable.   [place]:  normal size and placement Palpable S4: absent.  Heart rate: normal    Heart Rhythm: regular     Heart Sounds: S1: normal  S2: normal intensity  S3: absent   S4: absent  Opening Snap: absent  A2-OS:  absent.   Pericardial rub: absent    Ejection click: None      Murmurs:  present - II/VI SAW at base   Extremity: moves all extremities equally.         DATA REVIEWED:         Results for orders placed in visit on 06/12/20   Echocardiogram Stress Test For Left Ventricular Outflow Tract Obstruction    Narrative · Resting left ventricle ejection fraction is normal at 61-65%. There is   severe septal asymmetric hypertrophy consistent with known hypertrophic   cardiomyopathy.  · Resting LVOT gradient is 8 mmHg.  · Low-dose dobutamine demonstrated the LVOT gradient of 8 mmHg.  · 20 mcg of dobutamine led to an LVOT gradient of 18 mmHg.  · 30 mcg of dobutamine led to an LVOT gradient of 18 mmHg. Patient was   taken to 78% of maximal, age-predicted heart rate.  · Conclusion: Hypertrophic obstructive cardiomyopathy with stress-induced   18 mmHg LVOT gradient in the absence of beta-blockade.                          --------------------------------------------------------------------------------------------------  LABS:   Lab Results   Component Value Date    GLUCOSE 89 08/28/2020    BUN 14 08/28/2020    CREATININE 0.83 08/28/2020    EGFRIFNONA 101 08/28/2020    BCR 16.9 08/28/2020    K 4.4  08/28/2020    CO2 29.0 08/28/2020    CALCIUM 9.2 08/28/2020    ALBUMIN 4.30 08/28/2020    AST 27 08/28/2020    ALT 21 08/28/2020     Lab Results   Component Value Date    WBC 9.75 08/28/2020    HGB 16.7 08/28/2020    HCT 50.7 08/28/2020    MCV 88.5 08/28/2020     08/28/2020     Lab Results   Component Value Date    CHOL 179 08/28/2020    TRIG 100 08/28/2020    HDL 50 08/28/2020     (H) 08/28/2020     Lab Results   Component Value Date    TSH 1.680 08/28/2020     Lab Results   Component Value Date    TROPONINT <0.010 03/30/2020     No results found for: HGBA1C  No results found for: DDIMER  Lab Results   Component Value Date    ALT 21 08/28/2020     No results found for: HGBA1C  Lab Results   Component Value Date    CREATININE 0.83 08/28/2020     No results found for: IRON, TIBC, FERRITIN  No results found for: INR, PROTIME    Assessment/Plan      1. Hypertrophic cardiomyopathy (CMS/HCC).  LVH is: Severe with IVS-3.9cm.  His most recent TTE showed stable asymmetric septal hypertrophy.  There are no high-risk family history.  MRI shows slight LGE isolated to the septum.  LGE.  He was complaining of palpitations unremarkable repeat ZIO.  This showed some episodes of SVT.  However, no evidence of nonsustained ventricular tachycardia.Normal burden of PACs and PVCs.  Difficult to assess his cardiovascular symptoms in the setting of a significant anxiety disorder.  However, he is complaining of more exertional dyspnea and increased cardiac awareness.  I was unable to correlate his cardiac awareness with his monitor.  In fact, most of the times he was activating the monitor for sinus mechanism.      He started Toprol XL and has had excellent CV effects, but is c/o fatigue and sexual SEs. He desires another agent.  I would like to use verapamil.  However, this is an insurance exclusion with his plan, so will trial diltiazem.  -Recommended 1st degree relative surveillance  -No indication for ICD.  -Yearly EKG  recommended, or if clinical status changes  -Call for concerning symptoms  -Will plan on repeat 2D TTE in 2021  -Worsening symptoms were discussed.  I have asked him to contact me if any of these develop.  -He does not currently meet indications for septal reduction therapy because he is asymptomatic.  -Low intensity aerobic exercise is reasonable as a part of a healthy lifestyle  -Start diltiazem 120 mg once a day.    2. Bicuspid aortic valve determined by imaging with nonrheumatic aortic insufficiency. ACC stage B.  There are no surgical indications at this time.  · The patient has been advised to remain in clinical surveillance every 12 months.  · Signs and symptoms of worsening valve disease discussed.  I've asked the patient contact me for an earlier appointment if these develop.  · I've recommended a repeat 2D TTE every 1 year.  · TTE due: 2021.  No indication based on 2017 ACC/AHA guidelines for IE prophylaxis for dental procedures: Optimal oral health is recommended through regular professional dental care and the use of appropriate dental products, such as manual, powered, and ultrasonic toothbrushes; dental floss; and other plaque-removal devices    3. Aortic ectasia, thoracic (CMS/HCC).  Patient's most recent TTE, 2020,  showed a tubular ascending aorta diameter of 4.0 cm. Last documented BSA: Body surface area is 2.79 meters squared.. This provides an ASI of 1. centimeters/meter squared.  Based on the best data that we have and ASI of less than 2.0 cm/meter squared has a 5-year rupture-free survival of 97.6%.  The next marked step-up in yearly event rate occurs at above 2.75.  Additionally the  patient does not have a family history of aneurysmal disease.  Patient's aortic valve is not trileaflet.  The patient does have systemic diseases diagnosed associated with aortic aneurysms: BAV.  I again explained to the patient that they currently do not meet criteria for a diagnosis of an aneurysm.  I also  explained the normal aorta grows about 0.1 cm per 10 years after the age of 25.  We also discussed issues that can lead to more rapid widening of the aorta, such as uncontrolled hypertension.  I again explained that, in contrast to aneurysms, we have little clinical trial data to guide on optimal timing for active surveillance.  -Recommended a TTE in 12 months to exclude interval dilatation  -I went over the signs and symptoms of aneurysmal dilatation.  -I've recommended clinical evaluation every 1 year.  -Next TTE due: 2021  -Importance of blood pressure control less than 130/80 emphasized  -Avoid smoking    4. Cardiac Risk Assessments based on 2019 ACCF guidelines:  A team-based care approach is recommended for the control of risk factors associated with ASCAD.  As such, Suhail Gamino was requested to have ongoing follow-up with their PCP. A PCP can provide the detailed monitoring that is required for management of risk factors. Essential HTN is a significant risk factor for stroke, heart disease and vascular disease. I've recommended the patient continue current medications, if any, as prescribed by the primary care provider. A diet emphasizing intake of vegetables, fruits, nuts, whole grains and fish is recommended. Physical activity recommendations were provided by literature. They were also provided with information regarding maintaining a healthy weight, heart-healthy dietary pattern and DASH information.  Goal blood pressure less than 130/80.  The patient's BMI is recommended to be calculated at least annually.  The patient's BMI is Body mass index is 44.1 kg/m²..  Tobacco status is assessed at every visit based on established guidelines.  The patient's nicotine status: Social History    Tobacco Use      Smoking status: Former Smoker        Packs/day: 1.50        Years: 4.00        Pack years: 6        Types: Cigarettes        Start date: 7/5/1996        Quit date: 2000        Years since  quittin.7      Smokeless tobacco: Current User        Types: Snuff    No follow-ups on file.

## 2020-09-21 NOTE — TELEPHONE ENCOUNTER
----- Message from Nga Ulrich sent at 9/21/2020 12:04 PM CDT -----  Regarding: MED REFILL  Contact: [phone]   Needs refill on ALLERGY RELIEF 180 MG, to [facility].

## 2020-09-21 NOTE — PATIENT INSTRUCTIONS
Diltiazem extended-release capsules or tablets  What is this medicine?  DILTIAZEM (dil GIOVANNY a zem) is a calcium-channel blocker. It affects the amount of calcium found in your heart and muscle cells. This relaxes your blood vessels, which can reduce the amount of work the heart has to do. This medicine is used to treat high blood pressure and chest pain caused by angina.  This medicine may be used for other purposes; ask your health care provider or pharmacist if you have questions.  COMMON BRAND NAME(S): Cardizem CD, Cardizem LA, Cardizem SR, Cartia XT, Dilacor XR, Dilt-CD, Diltia XT, Diltzac, Matzim LA, Taztia XT, TIADYLT ER, Tiamate, Tiazac  What should I tell my health care provider before I take this medicine?  They need to know if you have any of these conditions:  · heart problems, low blood pressure, irregular heartbeat  · liver disease  · previous heart attack  · an unusual or allergic reaction to diltiazem, other medicines, foods, dyes, or preservatives  · pregnant or trying to get pregnant  · breast-feeding  How should I use this medicine?  Take this medicine by mouth with a glass of water. Follow the directions on the prescription label. Swallow whole, do not crush or chew. Ask your doctor or pharmacist if your should take this medicine with food. Take your doses at regular intervals. Do not take your medicine more often then directed. Do not stop taking except on the advice of your doctor or health care professional. Ask your doctor or health care professional how to gradually reduce the dose.  Talk to your pediatrician regarding the use of this medicine in children. Special care may be needed.  Overdosage: If you think you have taken too much of this medicine contact a poison control center or emergency room at once.  NOTE: This medicine is only for you. Do not share this medicine with others.  What if I miss a dose?  If you miss a dose, take it as soon as you can. If it is almost time for your next  dose, take only that dose. Do not take double or extra doses.  What may interact with this medicine?  Do not take this medicine with any of the following medications:  · cisapride  · hawthorn  · pimozide  · ranolazine  · red yeast rice  This medicine may also interact with the following medications:  · buspirone  · carbamazepine  · cimetidine  · cyclosporine  · digoxin  · local anesthetics or general anesthetics  · lovastatin  · medicines for anxiety or difficulty sleeping like midazolam and triazolam  · medicines for high blood pressure or heart problems  · quinidine  · rifampin, rifabutin, or rifapentine  This list may not describe all possible interactions. Give your health care provider a list of all the medicines, herbs, non-prescription drugs, or dietary supplements you use. Also tell them if you smoke, drink alcohol, or use illegal drugs. Some items may interact with your medicine.  What should I watch for while using this medicine?  Check your blood pressure and pulse rate regularly. Ask your doctor or health care professional what your blood pressure and pulse rate should be and when you should contact him or her.  You may feel dizzy or lightheaded. Do not drive, use machinery, or do anything that needs mental alertness until you know how this medicine affects you. To reduce the risk of dizzy or fainting spells, do not sit or stand up quickly, especially if you are an older patient. Alcohol can make you more dizzy or increase flushing and rapid heartbeats. Avoid alcoholic drinks.  What side effects may I notice from receiving this medicine?  Side effects that you should report to your doctor or health care professional as soon as possible:  · allergic reactions like skin rash, itching or hives, swelling of the face, lips, or tongue  · confusion, mental depression  · feeling faint or lightheaded, falls  · redness, blistering, peeling or loosening of the skin, including inside the mouth  · slow, irregular  heartbeat  · swelling of the feet and ankles  · unusual bleeding or bruising, pinpoint red spots on the skin  Side effects that usually do not require medical attention (report to your doctor or health care professional if they continue or are bothersome):  · constipation or diarrhea  · difficulty sleeping  · facial flushing  · headache  · nausea, vomiting  · sexual dysfunction  · weak or tired  This list may not describe all possible side effects. Call your doctor for medical advice about side effects. You may report side effects to FDA at 7-025-TJC-2548.  Where should I keep my medicine?  Keep out of the reach of children.  Store at room temperature between 15 and 30 degrees C (59 and 86 degrees F). Protect from humidity. Throw away any unused medicine after the expiration date.  NOTE: This sheet is a summary. It may not cover all possible information. If you have questions about this medicine, talk to your doctor, pharmacist, or health care provider.  © 2020 [employer] (2020-06-08 16:05:56)

## 2020-10-05 ENCOUNTER — HOSPITAL ENCOUNTER (OUTPATIENT)
Dept: CT IMAGING | Facility: HOSPITAL | Age: 45
Discharge: HOME | End: 2020-10-05
Admitting: THORACIC SURGERY (CARDIOTHORACIC VASCULAR SURGERY)

## 2020-10-05 DIAGNOSIS — I71.20 THORACIC AORTIC ANEURYSM WITHOUT RUPTURE (HCC): ICD-10-CM

## 2020-10-05 PROCEDURE — 71250 CT THORAX DX C-: CPT

## 2020-10-12 ENCOUNTER — TELEPHONE (OUTPATIENT)
Dept: CARDIOLOGY | Facility: CLINIC | Age: 45
End: 2020-10-12

## 2020-10-12 NOTE — TELEPHONE ENCOUNTER
----- Message from Yony Greene MD PhD sent at 10/12/2020  8:17 AM CDT -----  The medication was not prescribed for HTN. He has a small degree of hypertrophic cardiomyopathy. I would suggest he discontinue this medication and we not use any for his cardiomyopathy at this point, especially since his gradient in his LV is so small. Keep next f/u appt  ----- Message -----  From: Maira Franklin MA  Sent: 10/12/2020   8:16 AM CDT  To: Yony Greene MD PhD      ----- Message -----  From: Fatou Smith  Sent: 10/12/2020   8:15 AM CDT  To: Maira Franklin MA    Pt was put on new BP medication and he called stating that he is getting dizzy and feels weird. He is also having hot flashes and is flushed at times. He is wanting to know if he should go back to the old medication. You can call him at [phone].      Attempted to contact patient. Voicemail left

## 2020-10-12 NOTE — TELEPHONE ENCOUNTER
----- Message from Yony Greene MD PhD sent at 10/12/2020  3:11 PM CDT -----  He told me he had side effects with metoprolol. If he wants to do it, it's fine with me.   ----- Message -----  From: Maira Franklin MA  Sent: 10/12/2020   3:04 PM CDT  To: Yony Greene MD PhD    He has palpitations when he is not on metoprolol.   ----- Message -----  From: Yony Greene MD PhD  Sent: 10/12/2020   8:17 AM CDT  To: Maira Franklin MA    The medication was not prescribed for HTN. He has a small degree of hypertrophic cardiomyopathy. I would suggest he discontinue this medication and we not use any for his cardiomyopathy at this point, especially since his gradient in his LV is so small. Keep next f/u appt  ----- Message -----  From: Maira Franklin MA  Sent: 10/12/2020   8:16 AM CDT  To: Yony Greene MD PhD      ----- Message -----  From: Fatou Smith  Sent: 10/12/2020   8:15 AM CDT  To: Maira Fraknlin MA    Pt was put on new BP medication and he called stating that he is getting dizzy and feels weird. He is also having hot flashes and is flushed at times. He is wanting to know if he should go back to the old medication. You can call him at [phone].    Patient states he will restart metoprolol. This is ok per dr. Greene.

## 2020-10-16 ENCOUNTER — OFFICE VISIT (OUTPATIENT)
Dept: FAMILY MEDICINE CLINIC | Facility: CLINIC | Age: 45
End: 2020-10-16

## 2020-10-16 VITALS
BODY MASS INDEX: 39.17 KG/M2 | HEIGHT: 75 IN | TEMPERATURE: 98.6 F | OXYGEN SATURATION: 99 % | HEART RATE: 74 BPM | SYSTOLIC BLOOD PRESSURE: 126 MMHG | WEIGHT: 315 LBS | DIASTOLIC BLOOD PRESSURE: 78 MMHG

## 2020-10-16 DIAGNOSIS — I42.2 HYPERTROPHIC CARDIOMYOPATHY (HCC): Chronic | ICD-10-CM

## 2020-10-16 DIAGNOSIS — E78.2 MIXED HYPERLIPIDEMIA: Chronic | ICD-10-CM

## 2020-10-16 DIAGNOSIS — E66.01 CLASS 3 SEVERE OBESITY DUE TO EXCESS CALORIES WITHOUT SERIOUS COMORBIDITY WITH BODY MASS INDEX (BMI) OF 40.0 TO 44.9 IN ADULT (HCC): Chronic | ICD-10-CM

## 2020-10-16 DIAGNOSIS — I77.810 AORTIC ECTASIA, THORACIC (HCC): Primary | Chronic | ICD-10-CM

## 2020-10-16 PROCEDURE — 99214 OFFICE O/P EST MOD 30 MIN: CPT | Performed by: INTERNAL MEDICINE

## 2020-10-16 RX ORDER — BUSPIRONE HYDROCHLORIDE 15 MG/1
15 TABLET ORAL DAILY
COMMUNITY
End: 2020-10-19

## 2020-10-16 RX ORDER — METOPROLOL SUCCINATE 25 MG/1
50 TABLET, EXTENDED RELEASE ORAL NIGHTLY
COMMUNITY
End: 2021-04-05 | Stop reason: ALTCHOICE

## 2020-10-16 NOTE — PROGRESS NOTES
Chief Complaint   Patient presents with   • Results     patient wants to discuss ct results done by Dr Peace    • lab results     patient is needing lab results from aug     [facility]   Suhail Nemesio Gamino is a 45 y.o. male who presents to the office for follow-up.  He has hypertrophic cardiomyopathy and follows with cardiology.  He was also found to have a thoracic aortic aneurysm in October 2019.  He saw Dr. Peace for follow-up on this.  At the time the a sending aorta showed a dilation of 4.18 cm.  Surgical intervention was not indicated, and it was recommended that he have a repeat chest CT in 1 year.  He had a follow-up chest CT on 10/5/2020.  This shows the a sending thoracic aorta measuring approximately 3.4 to 3.7 cm.  He is here today to discuss these findings, and has several questions.    He recently saw cardiology and was placed on diltiazem.  He started having episodes of dizziness and hot flashes.  He then discontinued the medication and restarted his Toprol-XL at a lower dose of 25 mg daily. He does not have a diagnosis of hypertension, but takes this for his hypertrophic cardiomyopathy.  So far he has been doing well on the lower dose of metoprolol.    He would also like to review lab results which he had completed in August.    The following portions of the patient's history were reviewed and updated as appropriate: allergies, current medications, past family history, past medical history, past social history, past surgical history and problem list.    Review of Systems   Constitutional: Negative for chills, fatigue and fever.   HENT: Negative for congestion, sneezing, sore throat and trouble swallowing.    Eyes: Negative for visual disturbance.   Respiratory: Negative for cough, chest tightness, shortness of breath and wheezing.    Cardiovascular: Positive for palpitations. Negative for chest pain and leg swelling.   Gastrointestinal: Negative for abdominal pain, constipation,  "diarrhea, nausea and vomiting.   Genitourinary: Negative for dysuria, frequency and urgency.   Musculoskeletal: Positive for arthralgias. Negative for neck pain.   Skin: Negative for rash.   Neurological: Negative for dizziness, weakness and headaches.   Psychiatric/Behavioral:        Patient denies any feelings of depression and has not felt down, hopeless or lost interest in any activities.   All other systems reviewed and are negative.      Objective   Vitals:    10/16/20 0803   BP: 126/78   Pulse: 74   Temp: 98.6 °F (37 °C)   TempSrc: Oral   SpO2: 99%   Weight: (!) 160 kg (352 lb)   Height: 190.5 cm (75\")   PainSc: 0-No pain   PainLoc: Generalized      Body mass index is 44 kg/m².  Physical Exam  Vitals signs and nursing note reviewed.   Constitutional:       General: He is not in acute distress.     Appearance: He is well-developed.   HENT:      Head: Normocephalic and atraumatic.      Nose: Nose normal.      Mouth/Throat:      Pharynx: No oropharyngeal exudate.   Eyes:      General: No scleral icterus.     Conjunctiva/sclera: Conjunctivae normal.      Pupils: Pupils are equal, round, and reactive to light.   Neck:      Musculoskeletal: Normal range of motion and neck supple.   Cardiovascular:      Rate and Rhythm: Normal rate and regular rhythm.      Heart sounds: Normal heart sounds. No murmur. No friction rub. No gallop.    Pulmonary:      Effort: Pulmonary effort is normal. No respiratory distress.      Breath sounds: Normal breath sounds. No wheezing or rales.   Abdominal:      General: Bowel sounds are normal. There is no distension.      Palpations: Abdomen is soft.      Tenderness: There is no abdominal tenderness. There is no guarding or rebound.   Musculoskeletal: Normal range of motion.   Lymphadenopathy:      Cervical: No cervical adenopathy.   Skin:     General: Skin is warm and dry.      Findings: No rash.   Neurological:      Mental Status: He is alert and oriented to person, place, and time.     "  Cranial Nerves: No cranial nerve deficit.   Psychiatric:         Behavior: Behavior normal.         Thought Content: Thought content normal.         Judgment: Judgment normal.         Assessment/Plan   Diagnoses and all orders for this visit:    1. Aortic ectasia, thoracic (CMS/HCC) (Primary)    2. Hypertrophic cardiomyopathy (CMS/HCC)    3. Mixed hyperlipidemia    4. Class 3 severe obesity due to excess calories without serious comorbidity with body mass index (BMI) of 40.0 to 44.9 in adult (CMS/HCC)      Labs are reviewed with patient.  Overall his labs look good.  His LDL is slightly elevated at 109.  I discussed dietary measures to help with hyperlipidemia.  I reviewed results of his most recent chest CT with him.  The aortic ectasia/dilation appears stable.  The measurements are slightly decreased compared to his original study which was performed last year.  I have again reassured him that no surgical intervention is needed at this time.  He also had a small fluid-filled cyst noted on the left kidney.  This was an incidental finding, and we will continue to monitor for now.    Patient's Body mass index is 44 kg/m². BMI is above normal parameters. Recommendations include: educational material.    Patient does not want a flu shot today.         PHQ-2/PHQ-9 Depression Screening 10/16/2020   Little interest or pleasure in doing things 0   Feeling down, depressed, or hopeless 0   Trouble falling or staying asleep, or sleeping too much -   Feeling tired or having little energy -   Poor appetite or overeating -   Feeling bad about yourself - or that you are a failure or have let yourself or your family down -   Trouble concentrating on things, such as reading the newspaper or watching television -   Moving or speaking so slowly that other people could have noticed. Or the opposite - being so fidgety or restless that you have been moving around a lot more than usual -   Thoughts that you would be better off dead, or  of hurting yourself in some way -   Total Score 0   If you checked off any problems, how difficult have these problems made it for you to do your work, take care of things at home, or get along with other people? -

## 2020-10-19 DIAGNOSIS — F41.1 GENERALIZED ANXIETY DISORDER: Chronic | ICD-10-CM

## 2020-10-19 RX ORDER — BUSPIRONE HYDROCHLORIDE 10 MG/1
TABLET ORAL
Qty: 60 TABLET | Refills: 5 | Status: SHIPPED | OUTPATIENT
Start: 2020-10-19 | End: 2020-11-30 | Stop reason: SDUPTHER

## 2020-11-30 ENCOUNTER — TELEMEDICINE (OUTPATIENT)
Dept: FAMILY MEDICINE CLINIC | Facility: CLINIC | Age: 45
End: 2020-11-30

## 2020-11-30 DIAGNOSIS — F41.1 GENERALIZED ANXIETY DISORDER: Primary | Chronic | ICD-10-CM

## 2020-11-30 PROCEDURE — 99442 PR PHYS/QHP TELEPHONE EVALUATION 11-20 MIN: CPT | Performed by: INTERNAL MEDICINE

## 2020-11-30 RX ORDER — BUSPIRONE HYDROCHLORIDE 10 MG/1
10 TABLET ORAL 3 TIMES DAILY
Qty: 90 TABLET | Refills: 5 | Status: SHIPPED | OUTPATIENT
Start: 2020-11-30 | End: 2022-04-19

## 2020-11-30 NOTE — PROGRESS NOTES
Telemedicine visit  Chief Complaint   Patient presents with   • Anxiety     Discussed medication change     You have chosen to receive care through a telehealth visit.  Do you consent to use a video/audio connection for your medical care today? Yes      Subjective   Suhail Gamino is a 45 y.o. male who is seen via telehealth video visit for follow-up.  He has anxiety and takes BuSpar 10 mg twice daily.  He recently started working third shift at his job.  He has not been sleeping as well.  He feels like this is contributing to his anxiety.  He feels like he is going to have a panic attack, but has never progressed to that level.  He feels like the BuSpar is working for him, just not as well as it did when he initially started taking it.  He denies any depression symptoms.    The following portions of the patient's history were reviewed and updated as appropriate: allergies, current medications, past family history, past medical history, past social history, past surgical history and problem list.    Review of Systems   Constitutional: Positive for fatigue. Negative for chills and fever.   HENT: Negative for congestion, sneezing, sore throat and trouble swallowing.    Eyes: Negative for visual disturbance.   Respiratory: Negative for cough, chest tightness, shortness of breath and wheezing.    Cardiovascular: Negative for chest pain, palpitations and leg swelling.   Gastrointestinal: Negative for abdominal pain, constipation, diarrhea, nausea and vomiting.   Genitourinary: Negative for dysuria, frequency and urgency.   Musculoskeletal: Positive for arthralgias and back pain. Negative for neck pain.   Skin: Negative for rash.   Neurological: Negative for dizziness.   Psychiatric/Behavioral: The patient is nervous/anxious.         Patient denies any feelings of depression and has not felt down, hopeless or lost interest in any activities.   All other systems reviewed and are negative.  Review of systems has  been reviewed and validated on 11/30/2020and updated with any changes.      Objective    There were no vitals taken for this visit.  Due to the possibility of current health risks associated with the patient being present in a clinical setting , and with current restrictions in place due to the COVID-19 pandemic, the patient was seen remotely.  Vital signs were unable to be obtained due to the nature of the remote visit.      Physical Exam  Constitutional:       General: He is not in acute distress.     Appearance: He is well-developed.   HENT:      Head: Normocephalic and atraumatic.      Right Ear: Hearing and external ear normal.      Left Ear: Hearing and external ear normal.      Nose: Nose normal.   Eyes:      General:         Right eye: No discharge.         Left eye: No discharge.      Pupils: Pupils are equal, round, and reactive to light.   Neck:      Musculoskeletal: Normal range of motion.   Pulmonary:      Effort: Pulmonary effort is normal.   Neurological:      Mental Status: He is alert and oriented to person, place, and time.      Cranial Nerves: No cranial nerve deficit.   Psychiatric:         Behavior: Behavior normal.         Thought Content: Thought content normal.         Judgment: Judgment normal.         Assessment/Plan   Diagnoses and all orders for this visit:    1. Generalized anxiety disorder (Primary)  -     busPIRone (BUSPAR) 10 MG tablet; Take 1 tablet by mouth 3 (Three) Times a Day.  Dispense: 90 tablet; Refill: 5    He will continue with BuSpar for treatment of anxiety.  I will increase this to 10 mg 3 times a day.  If he has not noticed improvement in symptoms within a couple of weeks, he will let me know.  At that time I will consider further dose increases.       PHQ-2/PHQ-9 Depression Screening 10/16/2020   Little interest or pleasure in doing things 0   Feeling down, depressed, or hopeless 0   Trouble falling or staying asleep, or sleeping too much -   Feeling tired or having  little energy -   Poor appetite or overeating -   Feeling bad about yourself - or that you are a failure or have let yourself or your family down -   Trouble concentrating on things, such as reading the newspaper or watching television -   Moving or speaking so slowly that other people could have noticed. Or the opposite - being so fidgety or restless that you have been moving around a lot more than usual -   Thoughts that you would be better off dead, or of hurting yourself in some way -   Total Score 0   If you checked off any problems, how difficult have these problems made it for you to do your work, take care of things at home, or get along with other people? -

## 2021-01-04 ENCOUNTER — OFFICE VISIT (OUTPATIENT)
Dept: CARDIOLOGY | Facility: CLINIC | Age: 46
End: 2021-01-04

## 2021-01-04 VITALS
HEIGHT: 75 IN | BODY MASS INDEX: 39.17 KG/M2 | DIASTOLIC BLOOD PRESSURE: 84 MMHG | WEIGHT: 315 LBS | HEART RATE: 78 BPM | OXYGEN SATURATION: 99 % | SYSTOLIC BLOOD PRESSURE: 128 MMHG

## 2021-01-04 DIAGNOSIS — I35.1 NONRHEUMATIC AORTIC VALVE INSUFFICIENCY: ICD-10-CM

## 2021-01-04 DIAGNOSIS — I47.1 PAROXYSMAL SVT (SUPRAVENTRICULAR TACHYCARDIA) (HCC): Chronic | ICD-10-CM

## 2021-01-04 DIAGNOSIS — Q23.1 BICUSPID AORTIC VALVE DETERMINED BY IMAGING: ICD-10-CM

## 2021-01-04 DIAGNOSIS — E78.2 MIXED HYPERLIPIDEMIA: Chronic | ICD-10-CM

## 2021-01-04 DIAGNOSIS — I77.810 AORTIC ECTASIA, THORACIC (HCC): Chronic | ICD-10-CM

## 2021-01-04 DIAGNOSIS — R07.89 OTHER CHEST PAIN: ICD-10-CM

## 2021-01-04 DIAGNOSIS — I42.2 HYPERTROPHIC CARDIOMYOPATHY (HCC): Primary | ICD-10-CM

## 2021-01-04 PROCEDURE — 99214 OFFICE O/P EST MOD 30 MIN: CPT | Performed by: INTERNAL MEDICINE

## 2021-01-04 NOTE — PATIENT INSTRUCTIONS
Smokeless Tobacco Information, Adult    Tobacco is a leafy plant that contains a chemical (nicotine). Nicotine affects your brain and causes you to become addicted to it. Smokeless tobacco is tobacco that you put in your mouth instead of smoking it. It may also be called chewing tobacco or snuff.  Smokeless tobacco is made from the leaves of tobacco plants and comes in many forms, such as:  · Loose, dry leaves.  · Plugs or twists.  · Moist pouches.  · Dissolving lozenges or strips.  Chewing, sucking, or holding the tobacco in your mouth causes your mouth to make more saliva. The saliva mixes with the tobacco, which you swallow or spit out. Using tobacco is harmful to your health.  How can smokeless tobacco affect me?  All forms of tobacco contain many chemicals that can harm every organ in the body. Using smokeless tobacco increases your risk for:  · Cancer. Tobacco use is one of the leading causes of cancer. Smokeless tobacco is linked to cancer of the mouth, esophagus, and pancreas.  · Other long-term health problems, including high blood pressure, heart disease, and stroke.  · Addiction.  · Pregnancy complications, if this applies. Pregnant women who use smokeless tobacco are more likely to have a miscarriage or deliver a baby too early.  · Mouth and dental problems, such as:  ? Bad breath.  ? Teeth that look yellow or brown.  ? Mouth sores.  ? Cracking and bleeding lips.  ? Gum recession, gum disease, or tooth decay.  ? Lesions on the soft tissues of your mouth (leukoplakia).  The benefits of not using smokeless tobacco include:  · A healthy mind and body.  · Saving money. You avoid the cost of buying tobacco and the cost of treating illnesses that are caused by tobacco.  What actions can I take to quit using tobacco?  Ask your health care provider for help to quit using smokeless tobacco. This may involve treatment.  These tips may also help you quit:  · Pick a date to quit. Set a date within the next two  weeks. This gives you time to prepare.  · Write down the reasons why you are quitting. Keep this list in places where you will see it often, such as on your bathroom mirror or in your car or wallet.  · Identify the people, places, things, and activities that make you want to use smokeless tobacco (triggers). Avoid them.  · Get rid of any tobacco you have and remove any tobacco smells. To do this:  ? Throw away all containers of tobacco at home, at work, and in your car.  ? Throw away any other items that you use regularly when you chew tobacco.  ? Clean your car and make sure to remove all tobacco-related items.  ? Clean your home, including curtains and carpets.  · Tell your family and friends that you are quitting. Having support can make quitting easier.  · Chew sugarless gum or sunflower seeds when you want to use smokeless tobacco.  · Stay positive. Be prepared for cravings. It is common to slip up when you first quit, so take it one day at a time.  · Stay busy and take care of your body. Get plenty of exercise. Drink enough water to keep your urine pale yellow.  · Keep track of how many days have passed since you quit. Remembering how long and hard you have worked to quit can help you avoid using smokeless tobacco again.  Where to find support  Ask your health care provider if there is a local support group for quitting smokeless tobacco.  Where to find more information  You can learn more about the risks of using smokeless tobacco and the benefits of quitting from these sources:  · American Cancer Society: www.cancer.org  · [employer] [place]: www.cancer.gov  · Centers for Disease Control and Prevention: www.cdc.gov  Contact a health care provider if you have:  · Trouble quitting smokeless tobacco use on your own.  · White or other discolored patches in your mouth.  · Difficulty swallowing.  · A change in your voice.  · Unexplained weight loss.  · Stomach pain, nausea, or  vomiting.  Summary  · Smokeless tobacco contains many different chemicals that are known to cause cancer.  · Nicotine is an addictive chemical in smokeless tobacco that affects your brain.  · The benefits of not using smokeless tobacco include having a healthy mind and body and saving money.  · Tell your family and friends that you are quitting. Having support can make quitting easier.  · Ask your health care provider for help quitting smokeless tobacco. This may involve treatment.  This information is not intended to replace advice given to you by your health care provider. Make sure you discuss any questions you have with your health care provider.  Document Revised: 09/11/2020 Document Reviewed: 02/24/2020  [employer] Patient Education © 2020 [employer]

## 2021-01-04 NOTE — PROGRESS NOTES
Cardiovascular Medicine      oYny Greene M.D., Ph.D., [facility]             This is a 45 y.o. male with:    1.  HCM  A. CMRI with slight LGE and asymmetric septal hypertrophy  B. Normal HD response to DSE  C. ZIO with NO NSVT  2. Ectasia of TAA, 4.1 cm  3. AI  4. Possible BAV  5. Obesity      Suhail Gamino is a 45 y.o. male who returns to clinic today.     He returns today for hypertrophic cardiomyopathy.    As a matter of history, he was found to have marked LVH on EKG without a significant history of hypertension.  I was concerned for possible hypertrophic cardiomyopathy so he was referred for a 2-D echocardiogram.  Unfortunately, this showed marked LVH without evidence of obstruction.  He had severe asymmetric septal hypertrophy.   He was sent for cardiac MRI which is consistent with hypertrophic cardiomyopathy with very slight LGE in the septum.  It was also concerning for a bicuspid aortic valve.  Patient's ascending aorta was dilated at 4.1 cm.      He was diagnosed with hypertrophic cardiomyopathy.  His MCOT showed no evidence of NSVT.  He does have a history of SVT.  He has continued to have increased cardiac awareness but is not associated with an arrhythmia.  He is also got a significant anxiety disorder and often has sensations of chest tightness most likely related to this.  He had a coronary CTA which showed no evidence of obstructive coronary artery disease or evidence of myocardial bridging.  He continued to have sensations of chest tightness.  He was sent for a TST which showed no evidence of exercise-induced arrhythmia.  He had a normal burden of PACs and PVCs.  His stress echo also showed no evidence of a worsening gradient.  I thought this was primarily anxiety, but offered him a beta-blocker.  He was started on Toprol-XL.  When he followed up he reported excellent cardiovascular effects, but he was complaining of fatigue and tiredness desire to change medications.  He was  "changed to a calcium channel blocker, but reported side effects.  He is now back on his block. He continues to have a \"deep\" CP sensation. It reports this as deeper than before. He also reports some LUE wrist that aches with his heartbeat.     Review of Systems - Review of Systems   Cardiovascular: Positive for chest pain and dyspnea on exertion. Negative for claudication, cyanosis, irregular heartbeat, leg swelling, near-syncope, orthopnea, palpitations, paroxysmal nocturnal dyspnea and syncope.   Respiratory: Positive for shortness of breath. Negative for cough, hemoptysis, sleep disturbances due to breathing, snoring, sputum production and wheezing.    All other systems reviewed and are negative.       All other systems were reviewed and were negative.    family history includes Cancer in his paternal grandfather; Hyperlipidemia in his father; Hypertension in his father; No Known Problems in his daughter, daughter, mother, sister, sister, sister, son, and son.     reports that he quit smoking about 21 years ago. His smoking use included cigarettes. He started smoking about 24 years ago. He has a 6.00 pack-year smoking history. His smokeless tobacco use includes snuff. He reports that he does not drink alcohol or use drugs.    Allergies   Allergen Reactions   • Colcrys [Colchicine] Hives         Current Outpatient Medications:   •  busPIRone (BUSPAR) 10 MG tablet, Take 1 tablet by mouth 3 (Three) Times a Day., Disp: 90 tablet, Rfl: 5  •  cyclobenzaprine (FLEXERIL) 10 MG tablet, TAKE 1 TABLET BY MOUTH THREE TIMES A DAY AS NEEDED FOR MUSCLE SPASMS, Disp: 60 tablet, Rfl: 5  •  fexofenadine (Allergy Relief) 180 MG tablet, Take 1 tablet by mouth Daily., Disp: 30 tablet, Rfl: 5  •  metoprolol succinate XL (TOPROL-XL) 25 MG 24 hr tablet, Take 50 mg by mouth Every Night., Disp: , Rfl:     Physical Exam:    Vitals:    01/04/21 1343   BP: 128/84   Pulse: 78   SpO2: 99%     Body mass index is 44.27 kg/m².   Pulse Ox: Normal  " on room air  General: alert, appears stated age and cooperative     Body Habitus: obese    HEENT: Head: Normocephalic, no lesions, without obvious abnormality. No arcus senilis, xanthelasma or xanthomas.    JVP: Volume/Pulsation: Normal  Respirations: no increased work of breathing   Chest:  Normal    Pulmonary:Normal     Precordium: Normal impulses. P2 is not palpable.   [place]:  normal size and placement Palpable S4: absent.  Heart rate: normal    Heart Rhythm: regular     Heart Sounds: S1: normal  S2: normal intensity  S3: absent   S4: absent  Opening Snap: absent  A2-OS:  absent.   Pericardial rub: absent    Ejection click: None      Murmurs:  present - II/VI SAW at base   Extremity: moves all extremities equally.         DATA REVIEWED:       MRI shows ventricular hypertrophy with asymmetric septal hypertrophy.  Bicuspid aortic valve.  A sending aorta is dilated at 4.1 cm.  There is some patchy areas of delayed myocardial enhancement.  Results for orders placed in visit on 06/12/20   Echocardiogram Stress Test For Left Ventricular Outflow Tract Obstruction    Narrative · Resting left ventricle ejection fraction is normal at 61-65%. There is   severe septal asymmetric hypertrophy consistent with known hypertrophic   cardiomyopathy.  · Resting LVOT gradient is 8 mmHg.  · Low-dose dobutamine demonstrated the LVOT gradient of 8 mmHg.  · 20 mcg of dobutamine led to an LVOT gradient of 18 mmHg.  · 30 mcg of dobutamine led to an LVOT gradient of 18 mmHg. Patient was   taken to 78% of maximal, age-predicted heart rate.  · Conclusion: Hypertrophic obstructive cardiomyopathy with stress-induced   18 mmHg LVOT gradient in the absence of beta-blockade.        TTE shows preserved biventricular function.  Stress echo showed a resting gradient of the LVOT at 8 mmHg.  Maximal gradient was 18 mmHg.    Coronary CTA shows no evidence of coronary  disease.              --------------------------------------------------------------------------------------------------  LABS:   Lab Results   Component Value Date    GLUCOSE 89 08/28/2020    BUN 14 08/28/2020    CREATININE 0.83 08/28/2020    EGFRIFNONA 101 08/28/2020    BCR 16.9 08/28/2020    K 4.4 08/28/2020    CO2 29.0 08/28/2020    CALCIUM 9.2 08/28/2020    ALBUMIN 4.30 08/28/2020    AST 27 08/28/2020    ALT 21 08/28/2020     Lab Results   Component Value Date    WBC 9.75 08/28/2020    HGB 16.7 08/28/2020    HCT 50.7 08/28/2020    MCV 88.5 08/28/2020     08/28/2020     Lab Results   Component Value Date    CHOL 179 08/28/2020    TRIG 100 08/28/2020    HDL 50 08/28/2020     (H) 08/28/2020     Lab Results   Component Value Date    TSH 1.680 08/28/2020     Lab Results   Component Value Date    TROPONINT <0.010 03/30/2020     No results found for: HGBA1C  No results found for: DDIMER  Lab Results   Component Value Date    ALT 21 08/28/2020     No results found for: HGBA1C  Lab Results   Component Value Date    CREATININE 0.83 08/28/2020     No results found for: IRON, TIBC, FERRITIN  No results found for: INR, PROTIME    Assessment/Plan      1. Hypertrophic cardiomyopathy (CMS/HCC).  LVH is: Severe with IVS-3.9cm.  His most recent TTE showed stable asymmetric septal hypertrophy.  There are no high-risk family history.  MRI shows slight LGE isolated to the septum.  He was complaining of palpitations unremarkable repeat ZIO.  This showed some episodes of SVT.  However, no evidence of nonsustained ventricular tachycardia.Normal burden of PACs and PVCs.  Is been very difficult to assess his cardiovascular symptoms because he has significant anxiety disorder.  I have been unable to correlate his increased cardiac awareness with any arrhythmias.  In fact, most of the times that he activated his monitor he was in sinus mechanism he does not need any pharmacologic treatment because of his LVOT gradient.  He  has no evidence of obstruction.  However, he has tolerated a beta-blocker.  I think the primary effect from this has been psychological.     -Recommended 1st degree relative surveillance  -No indication for ICD.  -Yearly EKG recommended, or if clinical status changes  -Call for concerning symptoms  -Will plan on repeat 2D TTE in 2021  -Worsening symptoms were discussed.  I have asked him to contact me if any of these develop.  -He does not currently meet indications for septal reduction therapy because he is asymptomatic.  -Low intensity aerobic exercise is reasonable as a part of a healthy lifestyle    2. Bicuspid aortic valve determined by imaging with nonrheumatic aortic insufficiency. ACC stage B.  There are no surgical indications at this time.  · The patient has been advised to remain in clinical surveillance every 12 months.  · Signs and symptoms of worsening valve disease discussed.  I've asked the patient contact me for an earlier appointment if these develop.  · I've recommended a repeat 2D TTE every 1 year.  · TTE due: 2021.  No indication based on 2017 ACC/AHA guidelines for IE prophylaxis for dental procedures: Optimal oral health is recommended through regular professional dental care and the use of appropriate dental products, such as manual, powered, and ultrasonic toothbrushes; dental floss; and other plaque-removal devices    3. Aortic ectasia, thoracic (CMS/HCC).  Patient's most recent TTE, 2020,  showed a tubular ascending aorta diameter of 4.0 cm. Last documented BSA: Body surface area is 2.8 meters squared.. This provides an ASI of 1. centimeters/meter squared.  Based on the best data that we have and ASI of less than 2.0 cm/meter squared has a 5-year rupture-free survival of 97.6%.  The next marked step-up in yearly event rate occurs at above 2.75.  Additionally the  patient does not have a family history of aneurysmal disease.  Patient's aortic valve is not trileaflet.  The patient does have  systemic diseases diagnosed associated with aortic aneurysms: BAV.  I again explained to the patient that they currently do not meet criteria for a diagnosis of an aneurysm.  I also explained the normal aorta grows about 0.1 cm per 10 years after the age of 25.  We also discussed issues that can lead to more rapid widening of the aorta, such as uncontrolled hypertension.  I again explained that, in contrast to aneurysms, we have little clinical trial data to guide on optimal timing for active surveillance.  -Recommended a TTE in 12 months to exclude interval dilatation  -I went over the signs and symptoms of aneurysmal dilatation.  -I've recommended clinical evaluation every 1 year.  -Next TTE due:   -Importance of blood pressure control less than 130/80 emphasized  -Avoid smoking  -Follow-up Dr. Peace    4. Cardiac Risk Assessment based on 2019 ACCF guidelines: Blood pressure assessment, dyslipidemia goals, diet, bodyweight and exercise:  A team-based approach is recommended for the control of risk factors associated with ASCAD.  As such, Suhailneeru Gamino was requested to have ongoing follow-up with their PCP. BP is an important modifiable risk factor. I have asked the patient to see their PCP for BP monitoring and management, as needed. A diet emphasizing intake of vegetables, fruits, nuts, whole grains and fish is recommended. Physical activity recommendations have been provided. The patient's BMI is recommended to be calculated at least annually.  The patient's BMI is Body mass index is 44.27 kg/m²..  Tobacco status is assessed at every visit based on established guidelines.  The patient's nicotine status: Social History    Tobacco Use      Smoking status: Former Smoker        Packs/day: 1.50        Years: 4.00        Pack years: 6        Types: Cigarettes        Start date: 1996        Quit date: 2000        Years since quittin.0      Smokeless tobacco: Current User        Types:  Snuff      Nicotine/tobacco cessation was advised. Risks discussed. Hand-out given. Time: 3 minutes.     5. CPS, non-cardiac. He has continued to have non-cardiac CPS. He reports a deep aching sensation with associated left wrist pain. His coronary CTA was normal in 2019. Reassurance provided.       Return in about 6 months (around 7/4/2021).

## 2021-01-14 RX ORDER — METHYLPREDNISOLONE 4 MG/1
TABLET ORAL
Qty: 1 EACH | Refills: 0 | Status: SHIPPED | OUTPATIENT
Start: 2021-01-14 | End: 2021-02-02

## 2021-01-14 NOTE — TELEPHONE ENCOUNTER
----- Message from Nga Ulrich sent at 1/14/2021 12:49 PM CST -----  Regarding: needing script  Contact: [phone]   Patient called and is needing a script for Gout in his right foot. Patient is unable to come in for appointment due to wife having Covid. Send to Clinic [facility].

## 2021-01-14 NOTE — TELEPHONE ENCOUNTER
He can have a Medrol Dosepak.  He may also take over-the-counter ibuprofen to help with the inflammation.

## 2021-01-28 ENCOUNTER — TELEPHONE (OUTPATIENT)
Dept: CARDIOLOGY | Facility: CLINIC | Age: 46
End: 2021-01-28

## 2021-01-28 DIAGNOSIS — R00.2 PALPITATIONS: Primary | ICD-10-CM

## 2021-01-28 NOTE — TELEPHONE ENCOUNTER
Patient called stating last night while at work he got very dizzy all of a sudden and his heart rate shot through the roof. Stated he did not go to the ER last night because it seemed to chill out. He states this morning he is feeling weak and would like someone to give him a call please. Call back number [phone]      Thank you :)

## 2021-01-28 NOTE — TELEPHONE ENCOUNTER
Patient contacted. He will come in for a zio. He was instructed should he have any more symptoms like he had he should report to the er. Mr. Gamino was agreeable to the plan.

## 2021-02-02 ENCOUNTER — OFFICE VISIT (OUTPATIENT)
Dept: FAMILY MEDICINE CLINIC | Facility: CLINIC | Age: 46
End: 2021-02-02

## 2021-02-02 VITALS
WEIGHT: 315 LBS | OXYGEN SATURATION: 98 % | DIASTOLIC BLOOD PRESSURE: 88 MMHG | BODY MASS INDEX: 39.17 KG/M2 | HEART RATE: 90 BPM | HEIGHT: 75 IN | SYSTOLIC BLOOD PRESSURE: 138 MMHG

## 2021-02-02 DIAGNOSIS — F41.0 PANIC ATTACKS: ICD-10-CM

## 2021-02-02 DIAGNOSIS — F41.1 GENERALIZED ANXIETY DISORDER: Primary | Chronic | ICD-10-CM

## 2021-02-02 DIAGNOSIS — E66.01 CLASS 3 SEVERE OBESITY DUE TO EXCESS CALORIES WITHOUT SERIOUS COMORBIDITY WITH BODY MASS INDEX (BMI) OF 40.0 TO 44.9 IN ADULT (HCC): Chronic | ICD-10-CM

## 2021-02-02 DIAGNOSIS — M1A.0710 IDIOPATHIC CHRONIC GOUT OF RIGHT FOOT WITHOUT TOPHUS: Chronic | ICD-10-CM

## 2021-02-02 PROCEDURE — 99214 OFFICE O/P EST MOD 30 MIN: CPT | Performed by: INTERNAL MEDICINE

## 2021-02-02 RX ORDER — FLUOXETINE HYDROCHLORIDE 20 MG/1
20 CAPSULE ORAL DAILY
Qty: 30 CAPSULE | Refills: 5 | Status: SHIPPED | OUTPATIENT
Start: 2021-02-02 | End: 2021-07-15

## 2021-02-02 RX ORDER — ALLOPURINOL 100 MG/1
100 TABLET ORAL DAILY
Qty: 30 TABLET | Refills: 5 | Status: SHIPPED | OUTPATIENT
Start: 2021-02-02 | End: 2021-07-01

## 2021-02-02 RX ORDER — CLONAZEPAM 0.5 MG/1
0.5 TABLET ORAL 2 TIMES DAILY PRN
Qty: 60 TABLET | Refills: 1 | Status: SHIPPED | OUTPATIENT
Start: 2021-02-02 | End: 2021-04-05 | Stop reason: SDUPTHER

## 2021-02-02 NOTE — PROGRESS NOTES
Chief Complaint   Patient presents with   • Anxiety     x1 week    • Dizziness     x1 week, sitting at work all of a sudden room was spinning, felt like eyes were going back and forth, lasted about 2 minutes, started shaking uncontrollably   • Gout     Subjective   Suhail Gamino is a 45 y.o. male who presents to the office for follow-up.  I saw him on 11/30/2020 for evaluation of anxiety.  He had just started working third shift at his job and felt like this was contributing to his anxiety.  He felt like he was going to have a panic attack, but never progressed to that level.  At the time, he was taking BuSpar 10 mg twice a day.  I increased his BuSpar to 10 mg 3 times a day, and this helped with his symptoms.  He is now starting to have increased episodes of anxiety once again.    He had an episode at work in which he felt dizzy and the room was spinning.  This lasted a couple of minutes and then resolved.  He feels like this was associated with his anxiety and panic attack.  He saw his cardiologist and currently has an event monitor in place to evaluate for any cardiac involvement.    He also has gout and had a gout flareup last month.  This was treated with a Medrol Dosepak.  He has been having frequent flareups in his gout.    The following portions of the patient's history were reviewed and updated as appropriate: allergies, current medications, past family history, past medical history, past social history, past surgical history and problem list.    Review of Systems   Constitutional: Positive for fatigue. Negative for chills and fever.   HENT: Negative for congestion, sneezing, sore throat and trouble swallowing.    Eyes: Negative for visual disturbance.   Respiratory: Negative for cough, chest tightness, shortness of breath and wheezing.    Cardiovascular: Negative for chest pain, palpitations and leg swelling.   Gastrointestinal: Negative for abdominal pain, constipation, diarrhea, nausea and  "vomiting.   Genitourinary: Negative for dysuria, frequency and urgency.   Musculoskeletal: Positive for arthralgias and back pain. Negative for neck pain.   Skin: Negative for rash.   Neurological: Positive for dizziness.   Psychiatric/Behavioral: The patient is nervous/anxious.         Patient denies any feelings of depression and has not felt down, hopeless or lost interest in any activities.   All other systems reviewed and are negative.  Review of systems has been reviewed and validated on 02/02/2021and updated with any changes.      Objective    Vitals:    02/02/21 0830   BP: 138/88   Pulse: 90   SpO2: 98%   Weight: (!) 159 kg (351 lb)   Height: 190.5 cm (75\")   PainSc:   4   PainLoc: Back  Comment: lower right side     Body mass index is 43.87 kg/m².    Physical Exam  Vitals signs and nursing note reviewed.   Constitutional:       General: He is not in acute distress.     Appearance: He is well-developed.   HENT:      Head: Normocephalic and atraumatic.      Nose: Nose normal.   Eyes:      General: No scleral icterus.     Conjunctiva/sclera: Conjunctivae normal.      Pupils: Pupils are equal, round, and reactive to light.   Neck:      Musculoskeletal: Normal range of motion and neck supple.   Cardiovascular:      Rate and Rhythm: Normal rate and regular rhythm.      Heart sounds: Normal heart sounds. No murmur. No friction rub. No gallop.    Pulmonary:      Effort: Pulmonary effort is normal. No respiratory distress.      Breath sounds: Normal breath sounds. No wheezing or rales.   Musculoskeletal: Normal range of motion.   Lymphadenopathy:      Cervical: No cervical adenopathy.   Skin:     General: Skin is warm and dry.      Findings: No rash.   Neurological:      Mental Status: He is alert and oriented to person, place, and time.      Cranial Nerves: No cranial nerve deficit.   Psychiatric:         Behavior: Behavior normal.         Thought Content: Thought content normal.         Judgment: Judgment normal. "         Assessment/Plan   Diagnoses and all orders for this visit:    1. Generalized anxiety disorder (Primary)  -     FLUoxetine (PROzac) 20 MG capsule; Take 1 capsule by mouth Daily.  Dispense: 30 capsule; Refill: 5  -     clonazePAM (KlonoPIN) 0.5 MG tablet; Take 1 tablet by mouth 2 (Two) Times a Day As Needed for Anxiety.  Dispense: 60 tablet; Refill: 1    2. Panic attacks  -     clonazePAM (KlonoPIN) 0.5 MG tablet; Take 1 tablet by mouth 2 (Two) Times a Day As Needed for Anxiety.  Dispense: 60 tablet; Refill: 1    3. Idiopathic chronic gout of right foot without tophus  -     allopurinol (Zyloprim) 100 MG tablet; Take 1 tablet by mouth Daily.  Dispense: 30 tablet; Refill: 5    4. Class 3 severe obesity due to excess calories without serious comorbidity with body mass index (BMI) of 40.0 to 44.9 in adult (CMS/[facility])      I will start him on fluoxetine 20 mg daily for treatment of the anxiety and panic disorder.  He has done well with Klonopin in the past.  I will restart Klonopin 0.5 mg twice daily as needed for increased episodes of anxiety/panic attacks.  He will continue with BuSpar.  If his symptoms do well with the combination of fluoxetine and Klonopin, I may be able to discontinue the BuSpar in the future.    I will start him on allopurinol 100 mg daily for prophylactic treatment of the gout.    Patient understands the risks associated with this controlled medication, including tolerance and addiction.  Patient also agrees to only obtain this medication from me, and not from a another provider, unless that provider is covering for me in my absence.  Patient also agrees to be compliant in dosing, and not self adjust the dose of medication.  A signed controlled substance agreement is on file, and the patient has received a controlled substance education sheet at this or a previous visit.  The patient has also signed a consent for treatment with a controlled substance as per [facility] policy. ABDULLAHI is  obtained.    Patient's Body mass index is 43.87 kg/m². BMI is above normal parameters. Recommendations include: educational material, exercise counseling and nutrition counseling.        PHQ-2/PHQ-9 Depression Screening 2/2/2021   Little interest or pleasure in doing things 0   Feeling down, depressed, or hopeless 0   Trouble falling or staying asleep, or sleeping too much -   Feeling tired or having little energy -   Poor appetite or overeating -   Feeling bad about yourself - or that you are a failure or have let yourself or your family down -   Trouble concentrating on things, such as reading the newspaper or watching television -   Moving or speaking so slowly that other people could have noticed. Or the opposite - being so fidgety or restless that you have been moving around a lot more than usual -   Thoughts that you would be better off dead, or of hurting yourself in some way -   Total Score 0   If you checked off any problems, how difficult have these problems made it for you to do your work, take care of things at home, or get along with other people? -

## 2021-02-22 ENCOUNTER — DOCUMENTATION (OUTPATIENT)
Dept: CARDIOLOGY | Facility: CLINIC | Age: 46
End: 2021-02-22

## 2021-02-22 NOTE — PROGRESS NOTES
Yony Greene MD PhD  Lety Casey RN             Please let him know that his ZIO was overall normal.  He submitted symptoms 71 times and almost all of these correlated with sinus mechanism only.  I am not certain what he is actually feeling but it is not his heartbeat.  Because of the number of times he submitted symptoms a few of those did correlate with PACs and PVCs, which are normal.  He did have one episode of SVT that was asymptomatic.  Because this is essentially normal I recommended no changes in his medications.      Patient notified and he had no questions at this time

## 2021-03-01 RX ORDER — METOPROLOL SUCCINATE 50 MG/1
50 TABLET, EXTENDED RELEASE ORAL DAILY
Qty: 30 TABLET | Refills: 3 | OUTPATIENT
Start: 2021-03-01

## 2021-03-04 DIAGNOSIS — F41.0 PANIC ATTACKS: ICD-10-CM

## 2021-03-04 DIAGNOSIS — F41.1 GENERALIZED ANXIETY DISORDER: Chronic | ICD-10-CM

## 2021-03-04 RX ORDER — CLONAZEPAM 0.5 MG/1
TABLET ORAL
Qty: 60 TABLET | Refills: 1 | OUTPATIENT
Start: 2021-03-04

## 2021-04-02 DIAGNOSIS — J30.1 CHRONIC SEASONAL ALLERGIC RHINITIS DUE TO POLLEN: Chronic | ICD-10-CM

## 2021-04-02 RX ORDER — FEXOFENADINE HCL 180 MG/1
180 TABLET ORAL DAILY
Qty: 30 TABLET | Refills: 5 | Status: SHIPPED | OUTPATIENT
Start: 2021-04-02 | End: 2021-05-24

## 2021-04-05 DIAGNOSIS — F41.0 PANIC ATTACKS: ICD-10-CM

## 2021-04-05 DIAGNOSIS — F41.1 GENERALIZED ANXIETY DISORDER: Chronic | ICD-10-CM

## 2021-04-05 RX ORDER — METOPROLOL SUCCINATE 50 MG/1
50 TABLET, EXTENDED RELEASE ORAL DAILY
Qty: 90 TABLET | Refills: 3 | Status: CANCELLED | OUTPATIENT
Start: 2021-04-05

## 2021-04-05 RX ORDER — CLONAZEPAM 0.5 MG/1
0.5 TABLET ORAL 2 TIMES DAILY PRN
Qty: 60 TABLET | Refills: 0 | Status: SHIPPED | OUTPATIENT
Start: 2021-04-05 | End: 2021-05-24 | Stop reason: SDUPTHER

## 2021-04-05 RX ORDER — METOPROLOL SUCCINATE 50 MG/1
50 TABLET, EXTENDED RELEASE ORAL NIGHTLY
Qty: 31 TABLET | Refills: 3 | Status: SHIPPED | OUTPATIENT
Start: 2021-04-05 | End: 2021-08-06 | Stop reason: SDUPTHER

## 2021-04-12 ENCOUNTER — LAB (OUTPATIENT)
Dept: LAB | Facility: OTHER | Age: 46
End: 2021-04-12

## 2021-04-12 DIAGNOSIS — E78.2 MIXED HYPERLIPIDEMIA: Primary | ICD-10-CM

## 2021-04-12 DIAGNOSIS — E78.2 MIXED HYPERLIPIDEMIA: ICD-10-CM

## 2021-04-12 LAB
ALBUMIN SERPL-MCNC: 3.8 G/DL (ref 3.5–5)
ALBUMIN/GLOB SERPL: 1.2 G/DL (ref 1.1–1.8)
ALP SERPL-CCNC: 92 U/L (ref 38–126)
ALT SERPL W P-5'-P-CCNC: 20 U/L
ANION GAP SERPL CALCULATED.3IONS-SCNC: 6 MMOL/L (ref 5–15)
AST SERPL-CCNC: 25 U/L (ref 17–59)
BASOPHILS # BLD AUTO: 0.08 10*3/MM3 (ref 0–0.2)
BASOPHILS NFR BLD AUTO: 0.9 % (ref 0–1.5)
BILIRUB SERPL-MCNC: 0.3 MG/DL (ref 0.2–1.3)
BILIRUB UR QL STRIP: NEGATIVE
BUN SERPL-MCNC: 12 MG/DL (ref 7–23)
BUN/CREAT SERPL: 15.2 (ref 7–25)
CALCIUM SPEC-SCNC: 9.4 MG/DL (ref 8.4–10.2)
CHLORIDE SERPL-SCNC: 102 MMOL/L (ref 101–112)
CHOLEST SERPL-MCNC: 170 MG/DL (ref 150–200)
CLARITY UR: CLEAR
CO2 SERPL-SCNC: 31 MMOL/L (ref 22–30)
COLOR UR: YELLOW
CREAT SERPL-MCNC: 0.79 MG/DL (ref 0.7–1.3)
DEPRECATED RDW RBC AUTO: 44.6 FL (ref 37–54)
EOSINOPHIL # BLD AUTO: 0.17 10*3/MM3 (ref 0–0.4)
EOSINOPHIL NFR BLD AUTO: 1.9 % (ref 0.3–6.2)
ERYTHROCYTE [DISTWIDTH] IN BLOOD BY AUTOMATED COUNT: 14 % (ref 12.3–15.4)
GFR SERPL CREATININE-BSD FRML MDRD: 107 ML/MIN/1.73 (ref 63–147)
GLOBULIN UR ELPH-MCNC: 3.2 GM/DL (ref 2.3–3.5)
GLUCOSE SERPL-MCNC: 103 MG/DL (ref 70–99)
GLUCOSE UR STRIP-MCNC: NEGATIVE MG/DL
HCT VFR BLD AUTO: 45.7 % (ref 37.5–51)
HDLC SERPL-MCNC: 41 MG/DL (ref 40–59)
HGB BLD-MCNC: 15.3 G/DL (ref 13–17.7)
HGB UR QL STRIP.AUTO: NEGATIVE
KETONES UR QL STRIP: NEGATIVE
LDLC SERPL CALC-MCNC: 110 MG/DL
LDLC/HDLC SERPL: 2.63 {RATIO} (ref 0–3.55)
LEUKOCYTE ESTERASE UR QL STRIP.AUTO: NEGATIVE
LYMPHOCYTES # BLD AUTO: 2.82 10*3/MM3 (ref 0.7–3.1)
LYMPHOCYTES NFR BLD AUTO: 31.8 % (ref 19.6–45.3)
MCH RBC QN AUTO: 29.9 PG (ref 26.6–33)
MCHC RBC AUTO-ENTMCNC: 33.5 G/DL (ref 31.5–35.7)
MCV RBC AUTO: 89.3 FL (ref 79–97)
MONOCYTES # BLD AUTO: 0.89 10*3/MM3 (ref 0.1–0.9)
MONOCYTES NFR BLD AUTO: 10 % (ref 5–12)
NEUTROPHILS NFR BLD AUTO: 4.91 10*3/MM3 (ref 1.7–7)
NEUTROPHILS NFR BLD AUTO: 55.4 % (ref 42.7–76)
NITRITE UR QL STRIP: NEGATIVE
PH UR STRIP.AUTO: 5.5 [PH] (ref 5.5–8)
PLATELET # BLD AUTO: 247 10*3/MM3 (ref 140–450)
PMV BLD AUTO: 10 FL (ref 6–12)
POTASSIUM SERPL-SCNC: 4 MMOL/L (ref 3.4–5)
PROT SERPL-MCNC: 7 G/DL (ref 6.3–8.6)
PROT UR QL STRIP: NEGATIVE
RBC # BLD AUTO: 5.12 10*6/MM3 (ref 4.14–5.8)
SODIUM SERPL-SCNC: 139 MMOL/L (ref 137–145)
SP GR UR STRIP: 1.01 (ref 1–1.03)
T4 FREE SERPL-MCNC: 0.92 NG/DL (ref 0.93–1.7)
TRIGL SERPL-MCNC: 106 MG/DL
TSH SERPL DL<=0.05 MIU/L-ACNC: 2.45 UIU/ML (ref 0.27–4.2)
UROBILINOGEN UR QL STRIP: NORMAL
VLDLC SERPL-MCNC: 19 MG/DL (ref 5–40)
WBC # BLD AUTO: 8.87 10*3/MM3 (ref 3.4–10.8)

## 2021-04-12 PROCEDURE — 80050 GENERAL HEALTH PANEL: CPT | Performed by: INTERNAL MEDICINE

## 2021-04-12 PROCEDURE — 84439 ASSAY OF FREE THYROXINE: CPT | Performed by: INTERNAL MEDICINE

## 2021-04-12 PROCEDURE — 80061 LIPID PANEL: CPT | Performed by: INTERNAL MEDICINE

## 2021-04-12 PROCEDURE — 81003 URINALYSIS AUTO W/O SCOPE: CPT | Performed by: INTERNAL MEDICINE

## 2021-04-16 PROBLEM — R07.89 OTHER CHEST PAIN: Status: RESOLVED | Noted: 2019-09-25 | Resolved: 2021-04-16

## 2021-04-16 PROBLEM — R06.09 DYSPNEA ON EXERTION: Status: RESOLVED | Noted: 2020-04-29 | Resolved: 2021-04-16

## 2021-04-19 ENCOUNTER — OFFICE VISIT (OUTPATIENT)
Dept: FAMILY MEDICINE CLINIC | Facility: CLINIC | Age: 46
End: 2021-04-19

## 2021-04-19 VITALS
TEMPERATURE: 98.7 F | OXYGEN SATURATION: 99 % | WEIGHT: 315 LBS | HEART RATE: 74 BPM | HEIGHT: 75 IN | SYSTOLIC BLOOD PRESSURE: 134 MMHG | DIASTOLIC BLOOD PRESSURE: 74 MMHG | BODY MASS INDEX: 39.17 KG/M2

## 2021-04-19 DIAGNOSIS — F41.1 GENERALIZED ANXIETY DISORDER: Chronic | ICD-10-CM

## 2021-04-19 DIAGNOSIS — R42 DIZZINESS: ICD-10-CM

## 2021-04-19 DIAGNOSIS — M1A.0710 IDIOPATHIC CHRONIC GOUT OF RIGHT FOOT WITHOUT TOPHUS: Chronic | ICD-10-CM

## 2021-04-19 DIAGNOSIS — M15.9 PRIMARY OSTEOARTHRITIS INVOLVING MULTIPLE JOINTS: Chronic | ICD-10-CM

## 2021-04-19 DIAGNOSIS — I42.2 HYPERTROPHIC CARDIOMYOPATHY (HCC): Primary | Chronic | ICD-10-CM

## 2021-04-19 DIAGNOSIS — E78.2 MIXED HYPERLIPIDEMIA: Chronic | ICD-10-CM

## 2021-04-19 PROCEDURE — 99214 OFFICE O/P EST MOD 30 MIN: CPT | Performed by: INTERNAL MEDICINE

## 2021-04-19 NOTE — PROGRESS NOTES
Chief Complaint   Patient presents with   • Hyperlipidemia     6 month f/u on labs    • Dizziness     patient states that he has random dizziness, 2months      Answers for HPI/ROS submitted by the patient on 4/19/2021  What is the primary reason for your visit?: Other  Please describe your symptoms.: Random dizziness  Have you had these symptoms before?: No  How long have you been having these symptoms?: Greater than 2 weeks  Please list any medications you are currently taking for this condition.: None        Subjective   Suhail Gamino is a 45 y.o. male who presents to the office for follow-up and review of labs.  He has hypertrophic cardiomyopathy and ectasia of the thoracic aorta.  These have been evaluated by cardiology and remained stable.  He takes Toprol-XL 50 mg nightly for treatment of the cardiomyopathy.  He has hyperlipidemia and manages this with diet.  He has gout and takes allopurinol for prophylaxis.  This was started at his last visit, and he has been tolerating it without any side effects.  He has anxiety and takes fluoxetine and BuSpar.  He also takes Klonopin 0.5 mg twice daily as needed for flareups and anxiety.  His symptoms have been doing well with these medications.  He has osteoarthritis and takes an over-the-counter NSAID as needed.  He also takes Flexeril as needed for muscle spasm associated with the arthritic pain.      He complains of having episodes of dizziness.  This has been bothering him for a couple of months.  This usually lasts only for a few seconds up to a minute, and then resolves.  He has noticed it when walking and occasionally when driving.  It does not occur during any particular time of day.  He denies any chest pain or shortness of breath associated with this.  He has been monitoring his blood pressure and it has remained normal.    History of Present Illness has been reviewed and validated on 04/19/2021and updated with any changes.    The following  "portions of the patient's history were reviewed and updated as appropriate: allergies, current medications, past family history, past medical history, past social history, past surgical history and problem list.    Review of Systems   Constitutional: Negative for chills, fatigue and fever.   HENT: Negative for congestion, sneezing, sore throat and trouble swallowing.    Eyes: Negative for visual disturbance.   Respiratory: Negative for cough, chest tightness, shortness of breath and wheezing.    Cardiovascular: Negative for chest pain, palpitations and leg swelling.   Gastrointestinal: Negative for abdominal pain, constipation, diarrhea, nausea and vomiting.   Genitourinary: Negative for dysuria, frequency and urgency.   Musculoskeletal: Negative for neck pain.   Skin: Negative for rash.   Neurological: Positive for dizziness. Negative for weakness and headaches.   Psychiatric/Behavioral:        Patient denies any feelings of depression and has not felt down, hopeless or lost interest in any activities.   All other systems reviewed and are negative.  Review of systems has been reviewed and validated on 04/19/2021and updated with any changes.      Objective    Vitals:    04/19/21 0752   BP: 134/74   Pulse: 74   Temp: 98.7 °F (37.1 °C)   TempSrc: Oral   SpO2: 99%   Weight: (!) 160 kg (353 lb)   Height: 190.5 cm (75\")   PainSc:   3   PainLoc: Generalized     Body mass index is 44.12 kg/m².    Physical Exam  Vitals and nursing note reviewed.   Constitutional:       General: He is not in acute distress.     Appearance: He is well-developed.   HENT:      Head: Normocephalic and atraumatic.      Nose: Nose normal.   Eyes:      General: No scleral icterus.     Conjunctiva/sclera: Conjunctivae normal.      Pupils: Pupils are equal, round, and reactive to light.   Cardiovascular:      Rate and Rhythm: Normal rate and regular rhythm.      Heart sounds: Normal heart sounds. No murmur heard.   No friction rub. No gallop.  "   Pulmonary:      Effort: Pulmonary effort is normal. No respiratory distress.      Breath sounds: Normal breath sounds. No wheezing or rales.   Musculoskeletal:         General: Normal range of motion.      Cervical back: Normal range of motion and neck supple.   Lymphadenopathy:      Cervical: No cervical adenopathy.   Skin:     General: Skin is warm and dry.      Findings: No rash.   Neurological:      Mental Status: He is alert and oriented to person, place, and time.      Cranial Nerves: No cranial nerve deficit.   Psychiatric:         Behavior: Behavior normal.         Thought Content: Thought content normal.         Judgment: Judgment normal.     Physical exam has been reviewed and validated on 04/19/2021and updated with any changes.    Assessment/Plan   Diagnoses and all orders for this visit:    1. Hypertrophic cardiomyopathy (CMS/HCC) (Primary)    2. Mixed hyperlipidemia    3. Idiopathic chronic gout of right foot without tophus    4. Primary osteoarthritis involving multiple joints    5. Generalized anxiety disorder    6. Dizziness  -     CT Head Without Contrast; Future  -     US Carotid Bilateral; Future      Labs are reviewed with patient.  His glucose is mildly elevated at 103.  He will monitor his dietary intake of sugar and carbohydrates.  The remainder of his comprehensive metabolic panel is normal.  His total cholesterol is 170,  and triglycerides 106.  His HDL is 41.  He will continue with dietary management of hyperlipidemia.  He will continue with allopurinol for prophylactic treatment of his gout.  He will continue with BuSpar and fluoxetine for treatment of the anxiety.  He will also continue with Klonopin 0.5 mg twice daily as needed for increased anxiety.  He may use an over-the-counter NSAID as needed for any flareups in his osteoarthritis.  He will continue to use Flexeril as needed for muscle spasm.  He will continue with Toprol-XL 50 mg daily as directed by cardiology.    I will  order a CT of the head without contrast and carotid ultrasound for further evaluation of his dizziness.  I will follow-up with him once I have these results.    Patient understands the risks associated with this controlled medication, including tolerance and addiction.  Patient also agrees to only obtain this medication from me, and not from a another provider, unless that provider is covering for me in my absence.  Patient also agrees to be compliant in dosing, and not self adjust the dose of medication.  A signed controlled substance agreement is on file, and the patient has received a controlled substance education sheet at this or a previous visit.  The patient has also signed a consent for treatment with a controlled substance as per [facility] policy. ABDULLAHI is obtained.    Patient's Body mass index is 44.12 kg/m². BMI is above normal parameters. Recommendations include: educational material, exercise counseling and nutrition counseling.        PHQ-2/PHQ-9 Depression Screening 4/19/2021   Little interest or pleasure in doing things 0   Feeling down, depressed, or hopeless 0   Trouble falling or staying asleep, or sleeping too much -   Feeling tired or having little energy -   Poor appetite or overeating -   Feeling bad about yourself - or that you are a failure or have let yourself or your family down -   Trouble concentrating on things, such as reading the newspaper or watching television -   Moving or speaking so slowly that other people could have noticed. Or the opposite - being so fidgety or restless that you have been moving around a lot more than usual -   Thoughts that you would be better off dead, or of hurting yourself in some way -   Total Score 0   If you checked off any problems, how difficult have these problems made it for you to do your work, take care of things at home, or get along with other people? -

## 2021-05-03 DIAGNOSIS — J30.1 CHRONIC SEASONAL ALLERGIC RHINITIS DUE TO POLLEN: Chronic | ICD-10-CM

## 2021-05-06 DIAGNOSIS — R42 DIZZINESS: Primary | ICD-10-CM

## 2021-05-24 DIAGNOSIS — F41.0 PANIC ATTACKS: ICD-10-CM

## 2021-05-24 DIAGNOSIS — F41.1 GENERALIZED ANXIETY DISORDER: Chronic | ICD-10-CM

## 2021-05-24 RX ORDER — FEXOFENADINE HCL 180 MG/1
180 TABLET ORAL DAILY
Qty: 30 TABLET | Refills: 5 | Status: SHIPPED | OUTPATIENT
Start: 2021-05-24 | End: 2022-04-05

## 2021-05-24 RX ORDER — CLONAZEPAM 0.5 MG/1
0.5 TABLET ORAL 2 TIMES DAILY PRN
Qty: 60 TABLET | Refills: 3 | Status: SHIPPED | OUTPATIENT
Start: 2021-05-24 | End: 2021-10-01

## 2021-06-08 DIAGNOSIS — M16.51 POST-TRAUMATIC OSTEOARTHRITIS OF RIGHT HIP: Chronic | ICD-10-CM

## 2021-06-08 RX ORDER — CYCLOBENZAPRINE HCL 10 MG
TABLET ORAL
Qty: 60 TABLET | Refills: 5 | Status: SHIPPED | OUTPATIENT
Start: 2021-06-08 | End: 2022-06-24

## 2021-06-30 DIAGNOSIS — M1A.0710 IDIOPATHIC CHRONIC GOUT OF RIGHT FOOT WITHOUT TOPHUS: Chronic | ICD-10-CM

## 2021-07-01 RX ORDER — ALLOPURINOL 100 MG/1
100 TABLET ORAL DAILY
Qty: 30 TABLET | Refills: 5 | Status: SHIPPED | OUTPATIENT
Start: 2021-07-01 | End: 2021-12-06

## 2021-07-14 LAB
BH CV ECHO MEAS - ACS: 2.5 CM
BH CV ECHO MEAS - AI DEC SLOPE: 216 CM/SEC^2
BH CV ECHO MEAS - AI MAX PG: 57.5 MMHG
BH CV ECHO MEAS - AI MAX VEL: 379 CM/SEC
BH CV ECHO MEAS - AI P1/2T: 513.9 MSEC
BH CV ECHO MEAS - AO MAX PG (FULL): 22.3 MMHG
BH CV ECHO MEAS - AO MAX PG: 30.9 MMHG
BH CV ECHO MEAS - AO MEAN PG (FULL): 10 MMHG
BH CV ECHO MEAS - AO MEAN PG: 15 MMHG
BH CV ECHO MEAS - AO ROOT AREA (BSA CORRECTED): 1.3
BH CV ECHO MEAS - AO ROOT AREA: 10.8 CM^2
BH CV ECHO MEAS - AO ROOT DIAM: 3.7 CM
BH CV ECHO MEAS - AO V2 MAX: 278 CM/SEC
BH CV ECHO MEAS - AO V2 MEAN: 178 CM/SEC
BH CV ECHO MEAS - AO V2 VTI: 49.7 CM
BH CV ECHO MEAS - ASC AORTA: 3.6 CM
BH CV ECHO MEAS - AVA(I,A): 2.2 CM^2
BH CV ECHO MEAS - AVA(I,D): 2.2 CM^2
BH CV ECHO MEAS - AVA(V,A): 2.2 CM^2
BH CV ECHO MEAS - AVA(V,D): 2.2 CM^2
BH CV ECHO MEAS - BSA(HAYCOCK): 3 M^2
BH CV ECHO MEAS - BSA: 2.8 M^2
BH CV ECHO MEAS - BZI_BMI: 44.1 KILOGRAMS/M^2
BH CV ECHO MEAS - BZI_METRIC_HEIGHT: 190.5 CM
BH CV ECHO MEAS - BZI_METRIC_WEIGHT: 160.1 KG
BH CV ECHO MEAS - EDV(CUBED): 122.8 ML
BH CV ECHO MEAS - EDV(TEICH): 116.6 ML
BH CV ECHO MEAS - EF(CUBED): 77.8 %
BH CV ECHO MEAS - EF(TEICH): 69.7 %
BH CV ECHO MEAS - EPSS: 0.8 CM
BH CV ECHO MEAS - ESV(CUBED): 27.3 ML
BH CV ECHO MEAS - ESV(TEICH): 35.3 ML
BH CV ECHO MEAS - FS: 39.4 %
BH CV ECHO MEAS - IVS/LVPW: 1.3
BH CV ECHO MEAS - IVSD: 2.5 CM
BH CV ECHO MEAS - LV MASS(C)D: 580.7 GRAMS
BH CV ECHO MEAS - LV MASS(C)DI: 207.9 GRAMS/M^2
BH CV ECHO MEAS - LV MAX PG: 8.6 MMHG
BH CV ECHO MEAS - LV MEAN PG: 5 MMHG
BH CV ECHO MEAS - LV V1 MAX: 147 CM/SEC
BH CV ECHO MEAS - LV V1 MEAN: 104 CM/SEC
BH CV ECHO MEAS - LV V1 VTI: 26.9 CM
BH CV ECHO MEAS - LVIDD: 5 CM
BH CV ECHO MEAS - LVIDS: 3 CM
BH CV ECHO MEAS - LVOT AREA (M): 4.2 CM^2
BH CV ECHO MEAS - LVOT AREA: 4.2 CM^2
BH CV ECHO MEAS - LVOT DIAM: 2.3 CM
BH CV ECHO MEAS - LVPWD: 1.9 CM
BH CV ECHO MEAS - MV A MAX VEL: 83.2 CM/SEC
BH CV ECHO MEAS - MV DEC SLOPE: 578 CM/SEC^2
BH CV ECHO MEAS - MV E MAX VEL: 82 CM/SEC
BH CV ECHO MEAS - MV E/A: 0.99
BH CV ECHO MEAS - MV MAX PG: 3.1 MMHG
BH CV ECHO MEAS - MV MEAN PG: 2 MMHG
BH CV ECHO MEAS - MV P1/2T MAX VEL: 90.5 CM/SEC
BH CV ECHO MEAS - MV P1/2T: 45.9 MSEC
BH CV ECHO MEAS - MV V2 MAX: 88.7 CM/SEC
BH CV ECHO MEAS - MV V2 MEAN: 57.5 CM/SEC
BH CV ECHO MEAS - MV V2 VTI: 17.5 CM
BH CV ECHO MEAS - MVA P1/2T LCG: 2.4 CM^2
BH CV ECHO MEAS - MVA(P1/2T): 4.8 CM^2
BH CV ECHO MEAS - MVA(VTI): 6.4 CM^2
BH CV ECHO MEAS - PA MAX PG (FULL): 3.3 MMHG
BH CV ECHO MEAS - PA MAX PG: 5.3 MMHG
BH CV ECHO MEAS - PA MEAN PG (FULL): 1 MMHG
BH CV ECHO MEAS - PA MEAN PG: 2 MMHG
BH CV ECHO MEAS - PA V2 MAX: 115 CM/SEC
BH CV ECHO MEAS - PA V2 MEAN: 68.7 CM/SEC
BH CV ECHO MEAS - PA V2 VTI: 20.9 CM
BH CV ECHO MEAS - RAP SYSTOLE: 3 MMHG
BH CV ECHO MEAS - RV MAX PG: 2 MMHG
BH CV ECHO MEAS - RV MEAN PG: 1 MMHG
BH CV ECHO MEAS - RV V1 MAX: 70.5 CM/SEC
BH CV ECHO MEAS - RV V1 MEAN: 44.6 CM/SEC
BH CV ECHO MEAS - RV V1 VTI: 15.6 CM
BH CV ECHO MEAS - RVSP: 23.1 MMHG
BH CV ECHO MEAS - SI(AO): 191.3 ML/M^2
BH CV ECHO MEAS - SI(CUBED): 34.2 ML/M^2
BH CV ECHO MEAS - SI(LVOT): 40 ML/M^2
BH CV ECHO MEAS - SI(TEICH): 29.1 ML/M^2
BH CV ECHO MEAS - SV(AO): 534.4 ML
BH CV ECHO MEAS - SV(CUBED): 95.5 ML
BH CV ECHO MEAS - SV(LVOT): 111.8 ML
BH CV ECHO MEAS - SV(TEICH): 81.3 ML
BH CV ECHO MEAS - TR MAX VEL: 224 CM/SEC
BH CV VAS BP LEFT ARM: NORMAL MMHG

## 2021-07-15 DIAGNOSIS — F41.1 GENERALIZED ANXIETY DISORDER: Chronic | ICD-10-CM

## 2021-07-15 RX ORDER — FLUOXETINE HYDROCHLORIDE 20 MG/1
20 CAPSULE ORAL DAILY
Qty: 30 CAPSULE | Refills: 5 | Status: SHIPPED | OUTPATIENT
Start: 2021-07-15 | End: 2022-02-04

## 2021-07-20 ENCOUNTER — OFFICE VISIT (OUTPATIENT)
Dept: CARDIOLOGY | Facility: CLINIC | Age: 46
End: 2021-07-20

## 2021-07-20 VITALS
DIASTOLIC BLOOD PRESSURE: 80 MMHG | TEMPERATURE: 97.1 F | HEART RATE: 65 BPM | BODY MASS INDEX: 39.17 KG/M2 | OXYGEN SATURATION: 99 % | WEIGHT: 315 LBS | SYSTOLIC BLOOD PRESSURE: 128 MMHG | HEIGHT: 75 IN

## 2021-07-20 DIAGNOSIS — I42.2 HYPERTROPHIC CARDIOMYOPATHY (HCC): Primary | ICD-10-CM

## 2021-07-20 LAB
QT INTERVAL: 432 MS
QTC INTERVAL: 449 MS

## 2021-07-20 PROCEDURE — 99214 OFFICE O/P EST MOD 30 MIN: CPT | Performed by: INTERNAL MEDICINE

## 2021-07-20 PROCEDURE — 93000 ELECTROCARDIOGRAM COMPLETE: CPT | Performed by: INTERNAL MEDICINE

## 2021-07-20 NOTE — PROGRESS NOTES
Suhail Gamino  46 y.o. male    07/20/2021  Chief Complaint   Patient presents with   • Cardiomyopathy   Hypertrophic cardiomyopathy    History of Present Illness    Patient with a history of hypertrophic cardiomyopathy  -        SUBJECTIVE  Patient is being seen today for follow-up.  Is our first visit with him.  He has a history of hypertrophic cardiomyopathy diagnosed by echo and also confirmed by cardiac MRI.  He did have a mid septal uptake of gadolinium when this was done in [place].  He has had no syncopal episodes.  On questioning he said he did have those when he was younger he has not had them for 15 or 20 years.  He has no shortness of air.  He actually had a event monitor done which showed primarily PACs and one episode of PSVT.  He has no chest pain.  Allergies   Allergen Reactions   • Colcrys [Colchicine] Hives   • Diclofenac Other (See Comments)     Skin irritation, hot spots and sweating         Past Medical History:   Diagnosis Date   • Abnormal EKG    • Achilles tendinitis, left leg    • Acute bronchitis    • Atrial fibrillation (CMS/HCC) 2002   • Chest pain, atypical 9/25/2019   • Dyspnea on exertion 4/29/2020   • Fever    • Generalized anxiety disorder    • Health examination of defined subpopulation    • Heart murmur 2002   • Heart valve disease 2019   • Hip pain    • Hypertension    • Lumbar back pain     Pain radiating to lumbar region of back   • Obesity    • On long term drug therapy    • Osteoarthritis of hip    • Other long term (current) drug therapy          Past Surgical History:   Procedure Laterality Date   • HIP SURGERY Right    • VASECTOMY     • WISDOM TOOTH EXTRACTION           Family History   Problem Relation Age of Onset   • Hypertension Father    • Hyperlipidemia Father    • Cancer Paternal Grandfather         Colorectal Cancer   • No Known Problems Mother    • No Known Problems Sister    • No Known Problems Sister    • No Known Problems Sister    • No Known  "Problems Son    • No Known Problems Son    • No Known Problems Daughter    • No Known Problems Daughter          Social History     Socioeconomic History   • Marital status:      Spouse name: Tomas   • Number of children: 2   • Years of education: Not on file   • Highest education level: Not on file   Tobacco Use   • Smoking status: Former Smoker     Packs/day: 1.50     Years: 4.00     Pack years: 6.00     Types: Cigarettes     Start date: 1996     Quit date:      Years since quittin.5   • Smokeless tobacco: Current User     Types: Snuff   Vaping Use   • Vaping Use: Never used   Substance and Sexual Activity   • Alcohol use: No   • Drug use: No   • Sexual activity: Yes     Partners: Female     Birth control/protection: Surgical         Prior to Admission medications    Medication Sig Start Date End Date Taking? Authorizing Provider   allopurinol (ZYLOPRIM) 100 MG tablet TAKE 1 TABLET BY MOUTH DAILY 21  Yes Champ Sotomayor MD   clonazePAM (KlonoPIN) 0.5 MG tablet Take 1 tablet by mouth 2 (Two) Times a Day As Needed for Anxiety. 21  Yes Champ Sotomayor MD   cyclobenzaprine (FLEXERIL) 10 MG tablet TAKE 1 TABLET BY MOUTH THREE TIMES A DAY AS NEEDED FOR MUSCLE SPASMS 21  Yes Champ Sotomayor MD   fexofenadine (ALLEGRA) 180 MG tablet Take 1 tablet by mouth Daily. 21  Yes Champ Sotomayor MD   FLUoxetine (PROzac) 20 MG capsule TAKE 1 CAPSULE BY MOUTH DAILY 7/15/21  Yes Champ Sotomayor MD   metoprolol succinate XL (TOPROL-XL) 50 MG 24 hr tablet Take 1 tablet by mouth Every Night. 21  Yes Yony Greene MD PhD   busPIRone (BUSPAR) 10 MG tablet Take 1 tablet by mouth 3 (Three) Times a Day. 20   Champ Sotomayor MD         OBJECTIVE    /80   Pulse 65   Temp 97.1 °F (36.2 °C)   Ht 190.5 cm (75\")   Wt (!) 160 kg (352 lb)   SpO2 99%   BMI 44.00 kg/m²         Review of Systems     Constitutional:  Denies recent weight loss, weight gain, fever or chills, no " change in exercise tolerance     HENT:  Denies any hearing loss, epistaxis, hoarseness, or difficulty speaking.     Eyes: Wears eyeglasses or contact lenses     Respiratory:  Denies dyspnea with exertion,no cough, wheezing, or hemoptysis.     Cardiovascular: Negative for palpations, chest pain, orthopnea, PND, peripheral edema, syncope, or claudication.     Gastrointestinal:  Denies change in bowel habits, dyspepsia, ulcer disease, hematochezia, or melena.     Endocrine: Negative for cold intolerance, heat intolerance, polydipsia, polyphagia and polyuria. Denies any history of weight change, heat/cold intolerance, polydipsia, polyuria     Genitourinary: Negative.      Musculoskeletal: Denies any history of arthritic symptoms or back problems     Skin:  Denies any change in hair or nails, rashes, or skin lesions.     Allergic/Immunologic: Negative.  Negative for environmental allergies, food allergies and immunocompromised state.     Neurological:  Denies any history of recurrent headaches, strokes, TIA, or seizure disorder.     Hematological: Denies any food allergies, seasonal allergies, bleeding disorders, or lymphadenopathy.     Psychiatric/Behavioral: Denies any history of depression, substance abuse, or change in cognitive function.         Physical Exam     Constitutional: Cooperative, alert and oriented, well-developed, well-nourished, in no acute distress.     HENT:   Head: Normocephalic, normal hair patterns, no masses or tenderness.  Ears, Nose, and Throat: No gross abnormalities. No pallor or cyanosis. Dentition good.   Eyes: EOMS intact, PERRL, conjunctivae and lids unremarkable. Fundoscopic exam and visual fields not performed.   Neck: No palpable masses or adenopathy, no thyromegaly, no JVD, carotid pulses are full and equal bilaterally and without  Bruits.     Cardiovascular: Regular rhythm, S1 and S2 normal, no S3 or S4. Apical impulse not displaced. No murmurs, gallops, or rubs detected.          Pulmonary/Chest: Chest: normal symmetry, no tenderness to palpation, normal respiratory excursion, no intercostal retraction, no use of accessory muscles.            Pulmonary: Normal breath sounds. No rales or ronchi.    Abdominal: Abdomen soft, bowel sounds normoactive, no masses, no hepatosplenomegaly, non-tender, no bruits.     Musculoskeletal: No deformities, clubbing, cyanosis, erythema, or edema observed. There are no spinal abnormalities noted. Normal muscle strength and tone. Pulses full and equal in all extremities, no bruits auscultated.     Neurological: No gross motor or sensory deficits noted, affect appropriate, oriented to time, person, place.     Skin: Warm and dry to the touch, no apparent skin lesions or masses noted.     Psychiatric: He has a normal mood and affect. His behavior is normal. Judgment and thought content normal.         Procedures      Lab Results   Component Value Date    WBC 8.87 04/12/2021    HGB 15.3 04/12/2021    HCT 45.7 04/12/2021    MCV 89.3 04/12/2021     04/12/2021     Lab Results   Component Value Date    GLUCOSE 103 (H) 04/12/2021    BUN 12 04/12/2021    CREATININE 0.79 04/12/2021    EGFRIFNONA 107 04/12/2021    BCR 15.2 04/12/2021    CO2 31.0 (H) 04/12/2021    CALCIUM 9.4 04/12/2021    ALBUMIN 3.80 04/12/2021    AST 25 04/12/2021    ALT 20 04/12/2021     Lab Results   Component Value Date    CHOL 170 04/12/2021    CHOL 179 08/28/2020    CHOL 163 04/29/2019     Lab Results   Component Value Date    TRIG 106 04/12/2021    TRIG 100 08/28/2020    TRIG 47 04/29/2019     Lab Results   Component Value Date    HDL 41 04/12/2021    HDL 50 08/28/2020    HDL 52 04/29/2019     No components found for: LDLCALC  Lab Results   Component Value Date     (H) 04/12/2021     (H) 08/28/2020     (H) 04/29/2019     No results found for: HDLLDLRATIO  No components found for: CHOLHDL  No results found for: HGBA1C  Lab Results   Component Value Date    TSH 2.450  04/12/2021       ECG-normal sinus rhythm abnormal R waves consistent with hypertrophic cardiomyopathy with secondary ST-T wave changes    ASSESSMENT AND PLAN      Diagnoses and all orders for this visit:    1. Hypertrophic cardiomyopathy (CMS/HCC) (Primary)  -     ECG 12 Lead    I have went through both his echo that he recently had his monitor his previous cardiac MRI along with his family history and symptoms in detail.  He does have a gradient of about 15 to 20 mm.  He does not seem to be in the high risk category for sudden cardiac death.  He is actually going to a less strenuous job in the near future.  I have told him to call us if there is or any episodes of syncope lightheadedness or the like.  I went over ways to diminish dose and tell him he should try to avoid diuretics and anything that would vasodilate his blood vessels.    Patient's Body mass index is 44 kg/m². indicating that he is morbidly obese (BMI > 40 or > 35 with obesity - related health condition). Obesity-related health conditions include the following: Cardiac issues. Obesity is unchanged. BMI is is above average; no BMI management plan is appropriate. We discussed portion control..                Laura Orozco MD  7/20/2021  09:58 CDT

## 2021-08-02 ENCOUNTER — OFFICE VISIT (OUTPATIENT)
Dept: FAMILY MEDICINE CLINIC | Facility: CLINIC | Age: 46
End: 2021-08-02

## 2021-08-02 VITALS
WEIGHT: 315 LBS | BODY MASS INDEX: 39.17 KG/M2 | TEMPERATURE: 97.8 F | HEART RATE: 81 BPM | HEIGHT: 75 IN | OXYGEN SATURATION: 97 % | DIASTOLIC BLOOD PRESSURE: 84 MMHG | SYSTOLIC BLOOD PRESSURE: 124 MMHG

## 2021-08-02 DIAGNOSIS — M15.9 PRIMARY OSTEOARTHRITIS INVOLVING MULTIPLE JOINTS: Chronic | ICD-10-CM

## 2021-08-02 DIAGNOSIS — M54.42 ACUTE BILATERAL LOW BACK PAIN WITH BILATERAL SCIATICA: Primary | ICD-10-CM

## 2021-08-02 DIAGNOSIS — M79.672 LEFT FOOT PAIN: ICD-10-CM

## 2021-08-02 DIAGNOSIS — M54.41 ACUTE BILATERAL LOW BACK PAIN WITH BILATERAL SCIATICA: Primary | ICD-10-CM

## 2021-08-02 PROCEDURE — 96372 THER/PROPH/DIAG INJ SC/IM: CPT | Performed by: INTERNAL MEDICINE

## 2021-08-02 PROCEDURE — 99214 OFFICE O/P EST MOD 30 MIN: CPT | Performed by: INTERNAL MEDICINE

## 2021-08-02 RX ORDER — METHYLPREDNISOLONE ACETATE 80 MG/ML
80 INJECTION, SUSPENSION INTRA-ARTICULAR; INTRALESIONAL; INTRAMUSCULAR; SOFT TISSUE ONCE
Status: COMPLETED | OUTPATIENT
Start: 2021-08-02 | End: 2021-08-02

## 2021-08-02 RX ORDER — METHYLPREDNISOLONE 4 MG/1
TABLET ORAL
Qty: 1 EACH | Refills: 0 | Status: SHIPPED | OUTPATIENT
Start: 2021-08-02 | End: 2022-01-21

## 2021-08-02 RX ADMIN — METHYLPREDNISOLONE ACETATE 80 MG: 80 INJECTION, SUSPENSION INTRA-ARTICULAR; INTRALESIONAL; INTRAMUSCULAR; SOFT TISSUE at 16:03

## 2021-08-02 NOTE — PROGRESS NOTES
Chief Complaint   Patient presents with   • Back Pain     lower back   • Foot Pain     left foot, x 2 weeks        Subjective   Suhail Gamino is a 46 y.o. male who presents to the office for evaluation of increased pain in his low back and feet. He has osteoarthritis which causes low back pain.  He has been having increased pain in his low back for the past couple of weeks.  The pain radiates into the left lower extremity. He denies any injury to his back. He takes Flexeril as needed for muscle spasm.  He has also been taking Tylenol and ibuprofen to help with the pain in his back and left foot.  The pain in his left foot started a couple of weeks ago.  He denies any injury to the foot.  He describes a sharp pain.  He stands on his feet all day at work, and this tends to aggravate the foot pain.    History of Present Illness has been reviewed and validated on 08/02/2021 and updated with any changes.    The following portions of the patient's history were reviewed and updated as appropriate: allergies, current medications, past family history, past medical history, past social history, past surgical history and problem list.    Review of Systems   Constitutional: Negative for chills, fatigue and fever.   HENT: Negative for congestion, sneezing, sore throat and trouble swallowing.    Eyes: Negative for visual disturbance.   Respiratory: Negative for cough, chest tightness, shortness of breath and wheezing.    Cardiovascular: Negative for chest pain, palpitations and leg swelling.   Gastrointestinal: Negative for abdominal pain, constipation, diarrhea, nausea and vomiting.   Genitourinary: Negative for dysuria, frequency and urgency.   Musculoskeletal: Positive for back pain. Negative for neck pain.   Skin: Negative for rash.   Neurological: Negative for weakness and headaches.   Psychiatric/Behavioral:        Patient denies any feelings of depression and has not felt down, hopeless or lost interest in  "any activities.   All other systems reviewed and are negative.  Review of systems has been reviewed and validated on 08/02/2021 and updated with any changes.      Objective    Vitals:    08/02/21 1531   BP: 124/84   BP Location: Left arm   Patient Position: Sitting   Cuff Size: Large Adult   Pulse: 81   Temp: 97.8 °F (36.6 °C)   TempSrc: Tympanic   SpO2: 97%   Weight: (!) 159 kg (350 lb)   Height: 190.5 cm (75\")   PainSc:   6   PainLoc: Back     Body mass index is 43.75 kg/m².    Physical Exam  Vitals and nursing note reviewed.   Constitutional:       General: He is not in acute distress.     Appearance: He is well-developed.   HENT:      Head: Normocephalic and atraumatic.      Nose: Nose normal.   Eyes:      General: No scleral icterus.     Conjunctiva/sclera: Conjunctivae normal.      Pupils: Pupils are equal, round, and reactive to light.   Cardiovascular:      Rate and Rhythm: Normal rate and regular rhythm.      Heart sounds: Normal heart sounds. No murmur heard.   No friction rub. No gallop.    Pulmonary:      Effort: Pulmonary effort is normal. No respiratory distress.      Breath sounds: Normal breath sounds. No wheezing or rales.   Musculoskeletal:         General: Normal range of motion.      Cervical back: Normal range of motion and neck supple.      Lumbar back: Spasms present.      Comments: On exam of the lumbar spine, there is mild paravertebral muscle spasm on the left.  There is no vertebral point tenderness.   Feet:      Comments: He has callus present on the heel of his left foot.  Lymphadenopathy:      Cervical: No cervical adenopathy.   Skin:     General: Skin is warm and dry.      Findings: No rash.   Neurological:      Mental Status: He is alert and oriented to person, place, and time.      Cranial Nerves: No cranial nerve deficit.   Psychiatric:         Behavior: Behavior normal.         Thought Content: Thought content normal.         Judgment: Judgment normal.     Physical exam has been " reviewed and validated on 08/02/2021 and updated with any changes.    Assessment/Plan   Diagnoses and all orders for this visit:    1. Acute bilateral low back pain with bilateral sciatica (Primary)  -     methylPREDNISolone (MEDROL) 4 MG dose pack; Take as directed on package instructions.  Dispense: 1 each; Refill: 0  -     methylPREDNISolone acetate (DEPO-medrol) injection 80 mg    2. Left foot pain  -     XR Foot 3+ View Left; Future  -     methylPREDNISolone (MEDROL) 4 MG dose pack; Take as directed on package instructions.  Dispense: 1 each; Refill: 0  -     methylPREDNISolone acetate (DEPO-medrol) injection 80 mg    3. Primary osteoarthritis involving multiple joints      I will obtain an x-ray of the left foot for further evaluation of the foot pain.  He will continue with Tylenol and/or ibuprofen to help with the inflammation.  He is given a Medrol Dosepak and Depo-Medrol 80 mg IM.  I have recommended trying new shoes or better arch support in his current shoes to help with the foot pain.  He may continue to use Flexeril as needed for muscle spasm in his back.      Patient's Body mass index is 43.75 kg/m². indicating that he is morbidly obese (BMI > 40 or > 35 with obesity - related health condition). Obesity-related health conditions include the following: dyslipidemias and osteoarthritis. Obesity is unchanged. BMI is is above average; BMI management plan is completed. We discussed portion control and increasing exercise..        PHQ-2/PHQ-9 Depression Screening 8/2/2021   Little interest or pleasure in doing things 0   Feeling down, depressed, or hopeless 0   Trouble falling or staying asleep, or sleeping too much -   Feeling tired or having little energy -   Poor appetite or overeating -   Feeling bad about yourself - or that you are a failure or have let yourself or your family down -   Trouble concentrating on things, such as reading the newspaper or watching television -   Moving or speaking so slowly  that other people could have noticed. Or the opposite - being so fidgety or restless that you have been moving around a lot more than usual -   Thoughts that you would be better off dead, or of hurting yourself in some way -   Total Score 0   If you checked off any problems, how difficult have these problems made it for you to do your work, take care of things at home, or get along with other people? -

## 2021-08-03 ENCOUNTER — TELEPHONE (OUTPATIENT)
Dept: FAMILY MEDICINE CLINIC | Facility: CLINIC | Age: 46
End: 2021-08-03

## 2021-08-03 NOTE — TELEPHONE ENCOUNTER
I read Dr Sotomayor's message to the patient. He voiced understanding and thanked me for the call. The patient advised he would like to wait a week before deciding if he would like a referral to Podiatry. He will call the office if he decides to move forward.       ----- Message from Champ Sotomayor MD sent at 8/3/2021  6:57 AM CDT -----  Notify patient that his foot x-ray shows the presence of spurs on his heel.  This can cause heel pain, but is not likely the source of his foot pain.  If his foot pain does not improve with yesterday's treatment plan, he may need referral to podiatry for consult.

## 2021-08-06 DIAGNOSIS — I42.2 HYPERTROPHIC CARDIOMYOPATHY (HCC): Primary | ICD-10-CM

## 2021-08-06 RX ORDER — METOPROLOL SUCCINATE 50 MG/1
50 TABLET, EXTENDED RELEASE ORAL NIGHTLY
Qty: 30 TABLET | Refills: 5 | Status: SHIPPED | OUTPATIENT
Start: 2021-08-06 | End: 2022-01-04

## 2021-08-10 ENCOUNTER — TELEPHONE (OUTPATIENT)
Dept: FAMILY MEDICINE CLINIC | Facility: CLINIC | Age: 46
End: 2021-08-10

## 2021-08-10 DIAGNOSIS — M77.32 CALCANEAL SPUR OF LEFT FOOT: Primary | ICD-10-CM

## 2021-08-10 NOTE — TELEPHONE ENCOUNTER
Patient called and left message stating that he is still having pain in his left foot and is wanting a referral to Podiatry (someone local)

## 2021-08-30 DIAGNOSIS — F41.0 PANIC ATTACKS: ICD-10-CM

## 2021-08-30 DIAGNOSIS — F41.1 GENERALIZED ANXIETY DISORDER: Chronic | ICD-10-CM

## 2021-08-30 RX ORDER — CLONAZEPAM 0.5 MG/1
TABLET ORAL
Qty: 60 TABLET | Refills: 3 | OUTPATIENT
Start: 2021-08-30

## 2021-09-30 DIAGNOSIS — F41.0 PANIC ATTACKS: ICD-10-CM

## 2021-09-30 DIAGNOSIS — F41.1 GENERALIZED ANXIETY DISORDER: Chronic | ICD-10-CM

## 2021-10-01 RX ORDER — CLONAZEPAM 0.5 MG/1
TABLET ORAL
Qty: 60 TABLET | Refills: 0 | Status: SHIPPED | OUTPATIENT
Start: 2021-10-01 | End: 2021-11-04

## 2021-11-04 DIAGNOSIS — F41.0 PANIC ATTACKS: ICD-10-CM

## 2021-11-04 DIAGNOSIS — F41.1 GENERALIZED ANXIETY DISORDER: Chronic | ICD-10-CM

## 2021-11-04 RX ORDER — CLONAZEPAM 0.5 MG/1
TABLET ORAL
Qty: 60 TABLET | Refills: 1 | Status: SHIPPED | OUTPATIENT
Start: 2021-11-04 | End: 2022-01-04

## 2021-12-06 DIAGNOSIS — F41.1 GENERALIZED ANXIETY DISORDER: Chronic | ICD-10-CM

## 2021-12-06 DIAGNOSIS — M1A.0710 IDIOPATHIC CHRONIC GOUT OF RIGHT FOOT WITHOUT TOPHUS: Chronic | ICD-10-CM

## 2021-12-06 DIAGNOSIS — F41.0 PANIC ATTACKS: ICD-10-CM

## 2021-12-06 RX ORDER — CLONAZEPAM 0.5 MG/1
TABLET ORAL
Qty: 60 TABLET | Refills: 1 | OUTPATIENT
Start: 2021-12-06

## 2021-12-06 RX ORDER — ALLOPURINOL 100 MG/1
100 TABLET ORAL DAILY
Qty: 30 TABLET | Refills: 5 | Status: SHIPPED | OUTPATIENT
Start: 2021-12-06 | End: 2022-06-24

## 2021-12-29 ENCOUNTER — LAB (OUTPATIENT)
Dept: LAB | Facility: OTHER | Age: 46
End: 2021-12-29

## 2021-12-29 DIAGNOSIS — E78.2 MIXED HYPERLIPIDEMIA: ICD-10-CM

## 2021-12-29 LAB
ALBUMIN SERPL-MCNC: 4 G/DL (ref 3.5–5)
ALBUMIN/GLOB SERPL: 1.3 G/DL (ref 1.1–1.8)
ALP SERPL-CCNC: 90 U/L (ref 38–126)
ALT SERPL W P-5'-P-CCNC: 18 U/L
ANION GAP SERPL CALCULATED.3IONS-SCNC: 6 MMOL/L (ref 5–15)
ARTICHOKE IGE QN: 109 MG/DL (ref 0–100)
AST SERPL-CCNC: 27 U/L (ref 17–59)
BASOPHILS # BLD AUTO: 0.04 10*3/MM3 (ref 0–0.2)
BASOPHILS NFR BLD AUTO: 0.4 % (ref 0–1.5)
BILIRUB SERPL-MCNC: 0.2 MG/DL (ref 0.2–1.3)
BILIRUB UR QL STRIP: NEGATIVE
BUN SERPL-MCNC: 17 MG/DL (ref 7–23)
BUN/CREAT SERPL: 18.3 (ref 7–25)
CALCIUM SPEC-SCNC: 9.1 MG/DL (ref 8.4–10.2)
CHLORIDE SERPL-SCNC: 102 MMOL/L (ref 101–112)
CLARITY UR: CLEAR
CO2 SERPL-SCNC: 33 MMOL/L (ref 22–30)
COLOR UR: YELLOW
CREAT SERPL-MCNC: 0.93 MG/DL (ref 0.7–1.3)
DEPRECATED RDW RBC AUTO: 41.9 FL (ref 37–54)
EOSINOPHIL # BLD AUTO: 0.17 10*3/MM3 (ref 0–0.4)
EOSINOPHIL NFR BLD AUTO: 1.8 % (ref 0.3–6.2)
ERYTHROCYTE [DISTWIDTH] IN BLOOD BY AUTOMATED COUNT: 13 % (ref 12.3–15.4)
GFR SERPL CREATININE-BSD FRML MDRD: 88 ML/MIN/1.73 (ref 63–147)
GLOBULIN UR ELPH-MCNC: 3.2 GM/DL (ref 2.3–3.5)
GLUCOSE SERPL-MCNC: 109 MG/DL (ref 70–99)
GLUCOSE UR STRIP-MCNC: NEGATIVE MG/DL
HCT VFR BLD AUTO: 44.9 % (ref 37.5–51)
HGB BLD-MCNC: 14.9 G/DL (ref 13–17.7)
HGB UR QL STRIP.AUTO: NEGATIVE
KETONES UR QL STRIP: NEGATIVE
LEUKOCYTE ESTERASE UR QL STRIP.AUTO: NEGATIVE
LYMPHOCYTES # BLD AUTO: 2.58 10*3/MM3 (ref 0.7–3.1)
LYMPHOCYTES NFR BLD AUTO: 26.7 % (ref 19.6–45.3)
MCH RBC QN AUTO: 30 PG (ref 26.6–33)
MCHC RBC AUTO-ENTMCNC: 33.2 G/DL (ref 31.5–35.7)
MCV RBC AUTO: 90.3 FL (ref 79–97)
MONOCYTES # BLD AUTO: 0.75 10*3/MM3 (ref 0.1–0.9)
MONOCYTES NFR BLD AUTO: 7.8 % (ref 5–12)
NEUTROPHILS NFR BLD AUTO: 6.12 10*3/MM3 (ref 1.7–7)
NEUTROPHILS NFR BLD AUTO: 63.3 % (ref 42.7–76)
NITRITE UR QL STRIP: NEGATIVE
PH UR STRIP.AUTO: 5.5 [PH] (ref 5.5–8)
PLATELET # BLD AUTO: 284 10*3/MM3 (ref 140–450)
PMV BLD AUTO: 9.3 FL (ref 6–12)
POTASSIUM SERPL-SCNC: 3.9 MMOL/L (ref 3.4–5)
PROT SERPL-MCNC: 7.2 G/DL (ref 6.3–8.6)
PROT UR QL STRIP: NEGATIVE
RBC # BLD AUTO: 4.97 10*6/MM3 (ref 4.14–5.8)
SODIUM SERPL-SCNC: 141 MMOL/L (ref 137–145)
SP GR UR STRIP: >=1.03 (ref 1–1.03)
T4 FREE SERPL-MCNC: 1.01 NG/DL (ref 0.93–1.7)
TSH SERPL DL<=0.05 MIU/L-ACNC: 1.12 UIU/ML (ref 0.27–4.2)
UROBILINOGEN UR QL STRIP: NORMAL
WBC NRBC COR # BLD: 9.66 10*3/MM3 (ref 3.4–10.8)

## 2021-12-29 PROCEDURE — 80053 COMPREHEN METABOLIC PANEL: CPT | Performed by: INTERNAL MEDICINE

## 2021-12-29 PROCEDURE — 84439 ASSAY OF FREE THYROXINE: CPT | Performed by: INTERNAL MEDICINE

## 2021-12-29 PROCEDURE — 83721 ASSAY OF BLOOD LIPOPROTEIN: CPT | Performed by: INTERNAL MEDICINE

## 2021-12-29 PROCEDURE — 85025 COMPLETE CBC W/AUTO DIFF WBC: CPT | Performed by: INTERNAL MEDICINE

## 2021-12-29 PROCEDURE — 84443 ASSAY THYROID STIM HORMONE: CPT | Performed by: INTERNAL MEDICINE

## 2021-12-29 PROCEDURE — 81003 URINALYSIS AUTO W/O SCOPE: CPT | Performed by: INTERNAL MEDICINE

## 2021-12-29 PROCEDURE — 36415 COLL VENOUS BLD VENIPUNCTURE: CPT | Performed by: INTERNAL MEDICINE

## 2022-01-04 DIAGNOSIS — I42.2 HYPERTROPHIC CARDIOMYOPATHY: ICD-10-CM

## 2022-01-04 DIAGNOSIS — F41.1 GENERALIZED ANXIETY DISORDER: Chronic | ICD-10-CM

## 2022-01-04 DIAGNOSIS — F41.0 PANIC ATTACKS: ICD-10-CM

## 2022-01-04 PROBLEM — R73.02 IMPAIRED GLUCOSE TOLERANCE: Chronic | Status: ACTIVE | Noted: 2022-01-04

## 2022-01-04 RX ORDER — METOPROLOL SUCCINATE 50 MG/1
TABLET, EXTENDED RELEASE ORAL
Qty: 30 TABLET | Refills: 0 | Status: SHIPPED | OUTPATIENT
Start: 2022-01-04 | End: 2022-03-07 | Stop reason: SDUPTHER

## 2022-01-04 RX ORDER — CLONAZEPAM 0.5 MG/1
TABLET ORAL
Qty: 60 TABLET | Refills: 0 | Status: SHIPPED | OUTPATIENT
Start: 2022-01-04 | End: 2022-02-04

## 2022-01-20 NOTE — PROGRESS NOTES
Chief Complaint   Patient presents with   • Hyperlipidemia   • Impaired glucose tolerance   • Anxiety   • Cardiomyopathy   • Paroxysmal SVT (supraventricular tachycardia)   • Class 3 severe obesity due to excess calories without seriou   • Gout   • Aortic ectasia, thoracic   • Panic attacks   • Osteoarthritis       Subjective   Suhail Gamino is a 46 y.o. male who presents to the office for follow-up and review of labs.  He has hypertrophic cardiomyopathy and ectasia of the thoracic aorta.  These have been evaluated by cardiology and remain stable.  He takes Toprol-XL 50 mg nightly for treatment of the cardiomyopathy.  He has hyperlipidemia and manages this with diet.  He has gout and takes allopurinol 100 mg daily for prophylaxis. He has anxiety and takes fluoxetine 20 mg daily and BuSpar 10 mg 3 times daily.  He also takes Klonopin 0.5 mg twice daily as needed for flareups and anxiety.  His symptoms have been doing well with these medications.  He has osteoarthritis and takes an over-the-counter NSAID as needed.  He also takes Flexeril as needed for muscle spasm associated with the arthritic pain.      He has been having episodes of right upper quadrant discomfort.  This usually occurs after eating.  He has had episodes of nausea and diarrhea.  There is a strong family history of gallbladder disease, and he is concerned that his symptoms are related to his gallbladder.     History of Present Illness has been reviewed and validated on 01/21/2022 and updated with any changes.    The following portions of the patient's history were reviewed and updated as appropriate: allergies, current medications, past family history, past medical history, past social history, past surgical history and problem list.    Review of Systems   Constitutional: Negative for chills, fatigue and fever.   HENT: Negative for congestion, sneezing, sore throat and trouble swallowing.    Eyes: Negative for visual disturbance.  "  Respiratory: Negative for cough, chest tightness, shortness of breath and wheezing.    Cardiovascular: Negative for chest pain, palpitations and leg swelling.   Gastrointestinal: Positive for abdominal pain, diarrhea and nausea. Negative for constipation and vomiting.   Genitourinary: Negative for dysuria, frequency and urgency.   Musculoskeletal: Negative for neck pain.   Skin: Negative for rash.   Neurological: Negative for weakness and headaches.   Psychiatric/Behavioral:        Patient denies any feelings of depression and has not felt down, hopeless or lost interest in any activities.   All other systems reviewed and are negative.  Review of systems has been reviewed and validated on 01/21/2022 and updated with any changes.      Objective    Vitals:    01/21/22 0836   BP: 124/74   BP Location: Left arm   Patient Position: Sitting   Cuff Size: Large Adult   Pulse: 65  Comment: regular   Temp: 98.3 °F (36.8 °C)   TempSrc: Tympanic   SpO2: 98%   Weight: (!) 158 kg (348 lb)   Height: 190.5 cm (75\")   PainSc:   5   PainLoc: Back  Comment: low     Body mass index is 43.5 kg/m².    Physical Exam  Vitals and nursing note reviewed.   Constitutional:       General: He is not in acute distress.     Appearance: He is well-developed.   HENT:      Head: Normocephalic and atraumatic.      Nose: Nose normal.   Eyes:      General: No scleral icterus.     Conjunctiva/sclera: Conjunctivae normal.      Pupils: Pupils are equal, round, and reactive to light.   Cardiovascular:      Rate and Rhythm: Normal rate and regular rhythm.      Heart sounds: Normal heart sounds. No murmur heard.  No friction rub. No gallop.    Pulmonary:      Effort: Pulmonary effort is normal. No respiratory distress.      Breath sounds: Normal breath sounds. No wheezing or rales.   Musculoskeletal:         General: Normal range of motion.      Cervical back: Normal range of motion and neck supple.   Lymphadenopathy:      Cervical: No cervical adenopathy. "   Skin:     General: Skin is warm and dry.      Findings: No rash.   Neurological:      Mental Status: He is alert and oriented to person, place, and time.      Cranial Nerves: No cranial nerve deficit.   Psychiatric:         Behavior: Behavior normal.         Thought Content: Thought content normal.         Judgment: Judgment normal.     Physical exam has been reviewed and validated on 01/21/2022 and updated with any changes.    Assessment/Plan   Diagnoses and all orders for this visit:    1. Impaired glucose tolerance (Primary)    2. Mixed hyperlipidemia    3. Aortic ectasia, thoracic ([facility])    4. Hypertrophic cardiomyopathy ([facility])    5. Idiopathic chronic gout of right foot without tophus    6. Generalized anxiety disorder    7. Primary osteoarthritis involving multiple joints    8. Paroxysmal SVT (supraventricular tachycardia) ([facility])    9. Class 3 severe obesity due to excess calories without serious comorbidity with body mass index (BMI) of 40.0 to 44.9 in adult ([facility])    10. Panic attacks    11. RUQ abdominal pain  -     US Gallbladder; Future      Labs are reviewed with patient.  His glucose is slightly elevated at 109.  He will monitor his dietary intake of sugar and carbohydrates. His LDL cholesterol is 109. He will continue with dietary management of hyperlipidemia.  He will continue with allopurinol 100 mg daily for prophylactic treatment of his gout.  He will continue with BuSpar and fluoxetine for treatment of the anxiety.  He will also continue with Klonopin 0.5 mg twice daily as needed for increased anxiety.  He may use an over-the-counter NSAID as needed for any flareups in his osteoarthritis.  He will continue to use Flexeril as needed for muscle spasm.  He will continue with Toprol-XL 50 mg daily as directed by cardiology.    I will order a gallbladder ultrasound for evaluation of the right upper quadrant abdominal pain.  If the ultrasound is negative, he will also need a HIDA scan.  I will follow-up  with him once I have these results.    Patient understands the risks associated with this controlled medication, including tolerance and addiction.  Patient also agrees to only obtain this medication from me, and not from a another provider, unless that provider is covering for me in my absence.  Patient also agrees to be compliant in dosing, and not self adjust the dose of medication.  A signed controlled substance agreement is on file, and the patient has received a controlled substance education sheet at this or a previous visit.  The patient has also signed a consent for treatment with a controlled substance as per [facility] policy. ABDULLAHI is obtained.    Patient's Body mass index is 43.5 kg/m². indicating that he is morbidly obese (BMI > 40 or > 35 with obesity - related health condition). Obesity-related health conditions include the following: dyslipidemias and osteoarthritis. Obesity is unchanged. BMI is is above average; BMI management plan is completed. We discussed portion control and increasing exercise..        PHQ-2/PHQ-9 Depression Screening 1/21/2022   Little interest or pleasure in doing things 0   Feeling down, depressed, or hopeless 0   Trouble falling or staying asleep, or sleeping too much -   Feeling tired or having little energy -   Poor appetite or overeating -   Feeling bad about yourself - or that you are a failure or have let yourself or your family down -   Trouble concentrating on things, such as reading the newspaper or watching television -   Moving or speaking so slowly that other people could have noticed. Or the opposite - being so fidgety or restless that you have been moving around a lot more than usual -   Thoughts that you would be better off dead, or of hurting yourself in some way -   Total Score 0   If you checked off any problems, how difficult have these problems made it for you to do your work, take care of things at home, or get along with other people? -

## 2022-01-21 ENCOUNTER — OFFICE VISIT (OUTPATIENT)
Dept: FAMILY MEDICINE CLINIC | Facility: CLINIC | Age: 47
End: 2022-01-21

## 2022-01-21 VITALS
WEIGHT: 315 LBS | TEMPERATURE: 98.3 F | OXYGEN SATURATION: 98 % | HEIGHT: 75 IN | DIASTOLIC BLOOD PRESSURE: 74 MMHG | BODY MASS INDEX: 39.17 KG/M2 | HEART RATE: 65 BPM | SYSTOLIC BLOOD PRESSURE: 124 MMHG

## 2022-01-21 DIAGNOSIS — F41.0 PANIC ATTACKS: ICD-10-CM

## 2022-01-21 DIAGNOSIS — R10.11 RUQ ABDOMINAL PAIN: ICD-10-CM

## 2022-01-21 DIAGNOSIS — M1A.0710 IDIOPATHIC CHRONIC GOUT OF RIGHT FOOT WITHOUT TOPHUS: Chronic | ICD-10-CM

## 2022-01-21 DIAGNOSIS — F41.1 GENERALIZED ANXIETY DISORDER: Chronic | ICD-10-CM

## 2022-01-21 DIAGNOSIS — I47.1 PAROXYSMAL SVT (SUPRAVENTRICULAR TACHYCARDIA): ICD-10-CM

## 2022-01-21 DIAGNOSIS — I42.2 HYPERTROPHIC CARDIOMYOPATHY: Chronic | ICD-10-CM

## 2022-01-21 DIAGNOSIS — E78.2 MIXED HYPERLIPIDEMIA: Chronic | ICD-10-CM

## 2022-01-21 DIAGNOSIS — M15.9 PRIMARY OSTEOARTHRITIS INVOLVING MULTIPLE JOINTS: Chronic | ICD-10-CM

## 2022-01-21 DIAGNOSIS — R73.02 IMPAIRED GLUCOSE TOLERANCE: Primary | Chronic | ICD-10-CM

## 2022-01-21 DIAGNOSIS — I77.810 AORTIC ECTASIA, THORACIC: Chronic | ICD-10-CM

## 2022-01-21 DIAGNOSIS — E66.01 CLASS 3 SEVERE OBESITY DUE TO EXCESS CALORIES WITHOUT SERIOUS COMORBIDITY WITH BODY MASS INDEX (BMI) OF 40.0 TO 44.9 IN ADULT: ICD-10-CM

## 2022-01-21 PROCEDURE — 99214 OFFICE O/P EST MOD 30 MIN: CPT | Performed by: INTERNAL MEDICINE

## 2022-02-04 DIAGNOSIS — F41.1 GENERALIZED ANXIETY DISORDER: Chronic | ICD-10-CM

## 2022-02-04 DIAGNOSIS — F41.0 PANIC ATTACKS: ICD-10-CM

## 2022-02-04 RX ORDER — FLUOXETINE HYDROCHLORIDE 20 MG/1
20 CAPSULE ORAL DAILY
Qty: 30 CAPSULE | Refills: 5 | Status: SHIPPED | OUTPATIENT
Start: 2022-02-04 | End: 2022-08-01 | Stop reason: SDUPTHER

## 2022-02-04 RX ORDER — CLONAZEPAM 0.5 MG/1
TABLET ORAL
Qty: 60 TABLET | Refills: 0 | Status: SHIPPED | OUTPATIENT
Start: 2022-02-04 | End: 2022-03-03

## 2022-02-07 ENCOUNTER — OFFICE VISIT (OUTPATIENT)
Dept: PODIATRY | Facility: CLINIC | Age: 47
End: 2022-02-07

## 2022-02-07 VITALS
HEART RATE: 84 BPM | WEIGHT: 315 LBS | DIASTOLIC BLOOD PRESSURE: 84 MMHG | HEIGHT: 75 IN | BODY MASS INDEX: 39.17 KG/M2 | OXYGEN SATURATION: 100 % | SYSTOLIC BLOOD PRESSURE: 150 MMHG

## 2022-02-07 DIAGNOSIS — M76.62 ACHILLES TENDINITIS OF LEFT LOWER EXTREMITY: ICD-10-CM

## 2022-02-07 DIAGNOSIS — M79.672 LEFT FOOT PAIN: Primary | ICD-10-CM

## 2022-02-07 PROCEDURE — 99204 OFFICE O/P NEW MOD 45 MIN: CPT | Performed by: PODIATRIST

## 2022-02-07 RX ORDER — NABUMETONE 750 MG/1
750 TABLET, FILM COATED ORAL 2 TIMES DAILY
Qty: 60 TABLET | Refills: 1 | Status: SHIPPED | OUTPATIENT
Start: 2022-02-07 | End: 2022-08-01

## 2022-02-07 NOTE — PROGRESS NOTES
Suhail Gamino  1975  46 y.o. male     Left foot pain    2/9/2022  Chief Complaint   Patient presents with   • Left Foot - Pain     History of Present Illness    Suhail Gamino is a 46 y.o.male who presents to clinic with chief complaint of left foot pain. Pain is primarily located to the back of the heel. There are no associated injuries.    Past Medical History:   Diagnosis Date   • Abnormal EKG    • Achilles tendinitis, left leg    • Acute bronchitis    • Atrial fibrillation (HCC) 2002   • Callus    • Chest pain, atypical 9/25/2019   • Dyspnea on exertion 4/29/2020   • Fever    • Generalized anxiety disorder    • Gout    • Health examination of defined subpopulation    • Heart murmur 2002   • Heart valve disease 2019   • Hip pain    • Hypertension    • Ingrown toenail    • Lumbar back pain     Pain radiating to lumbar region of back   • Obesity    • On long term drug therapy    • Osteoarthritis of hip    • Other long term (current) drug therapy          Past Surgical History:   Procedure Laterality Date   • HIP SURGERY Right    • VASECTOMY     • WISDOM TOOTH EXTRACTION           Family History   Problem Relation Age of Onset   • Hyperlipidemia Father    • Hypertension Father    • Cancer Paternal Grandfather         Colorectal Cancer   • Heart disease Mother    • No Known Problems Sister    • Heart disease Maternal Grandmother    • Diabetes Paternal Grandmother    • No Known Problems Sister    • No Known Problems Sister    • No Known Problems Son    • No Known Problems Son    • No Known Problems Daughter    • No Known Problems Daughter    • Cancer Maternal Aunt        Allergies   Allergen Reactions   • Colcrys [Colchicine] Hives   • Diclofenac Other (See Comments)     Skin irritation, hot spots and sweating   • Mobic [Meloxicam] Palpitations       Social History     Socioeconomic History   • Marital status:      Spouse name: Tomas   • Number of children: 2   Tobacco Use   •  "Smoking status: Former Smoker     Packs/day: 1.50     Years: 4.00     Pack years: 6.00     Types: Cigarettes     Start date: 1996     Quit date:      Years since quittin.1   • Smokeless tobacco: Current User     Types: Snuff   Vaping Use   • Vaping Use: Never used   Substance and Sexual Activity   • Alcohol use: No   • Drug use: No   • Sexual activity: Yes     Partners: Female     Birth control/protection: Surgical         Current Outpatient Medications   Medication Sig Dispense Refill   • allopurinol (ZYLOPRIM) 100 MG tablet TAKE 1 TABLET BY MOUTH DAILY 30 tablet 5   • busPIRone (BUSPAR) 10 MG tablet Take 1 tablet by mouth 3 (Three) Times a Day. 90 tablet 5   • clonazePAM (KlonoPIN) 0.5 MG tablet TAKE 1 TABLET BY MOUTH TWO TIMES A DAY AS NEEDED FOR ANXIETY (MAY CAUSE DROWSINESS) 60 tablet 0   • cyclobenzaprine (FLEXERIL) 10 MG tablet TAKE 1 TABLET BY MOUTH THREE TIMES A DAY AS NEEDED FOR MUSCLE SPASMS 60 tablet 5   • fexofenadine (ALLEGRA) 180 MG tablet Take 1 tablet by mouth Daily. 30 tablet 5   • FLUoxetine (PROzac) 20 MG capsule TAKE 1 CAPSULE BY MOUTH DAILY 30 capsule 5   • metoprolol succinate XL (TOPROL-XL) 50 MG 24 hr tablet TAKE 1 TABLET BY MOUTH EVERY EVENING 30 tablet 0   • nabumetone (Relafen) 750 MG tablet Take 1 tablet by mouth 2 (Two) Times a Day. 60 tablet 1     No current facility-administered medications for this visit.     Review of Systems   Constitutional: Negative.    HENT: Negative.    Respiratory: Negative.    Cardiovascular: Negative.    Gastrointestinal: Negative.    Musculoskeletal:        Foot pain   Skin: Negative.    Psychiatric/Behavioral: Negative.          OBJECTIVE    /84   Pulse 84   Ht 190.5 cm (75\")   Wt (!) 158 kg (348 lb)   SpO2 100%   BMI 43.50 kg/m²       Physical Exam  Vitals reviewed.   Constitutional:       General: He is not in acute distress.     Appearance: He is well-developed.   HENT:      Head: Normocephalic and atraumatic.      Nose: Nose " normal.   Eyes:      Conjunctiva/sclera: Conjunctivae normal.      Pupils: Pupils are equal, round, and reactive to light.   Cardiovascular:      Pulses:           Dorsalis pedis pulses are 2+ on the right side and 2+ on the left side.        Posterior tibial pulses are 2+ on the right side and 2+ on the left side.   Pulmonary:      Effort: Pulmonary effort is normal. No respiratory distress.      Breath sounds: No wheezing.   Musculoskeletal:         General: Tenderness present. No deformity. Normal range of motion.      Right foot: Normal range of motion. No deformity.      Left foot: Normal range of motion. No deformity.        Feet:    Feet:      Right foot:      Protective Sensation: 5 sites tested. 5 sites sensed.      Skin integrity: Callus and dry skin present.      Left foot:      Protective Sensation: 5 sites tested. 5 sites sensed.      Skin integrity: Callus and dry skin present.   Skin:     General: Skin is warm and dry.      Capillary Refill: Capillary refill takes less than 2 seconds.   Neurological:      Mental Status: He is alert and oriented to person, place, and time.   Psychiatric:         Behavior: Behavior normal.         Thought Content: Thought content normal.               Procedures        ASSESSMENT AND PLAN    Diagnoses and all orders for this visit:    1. Left foot pain (Primary)    2. Achilles tendinitis of left lower extremity    Other orders  -     nabumetone (Relafen) 750 MG tablet; Take 1 tablet by mouth 2 (Two) Times a Day.  Dispense: 60 tablet; Refill: 1      - Comprehensive foot and ankle exam performed  - Radiographs taken and reviewed. No acute osseous or articular abnormalities.   - Patient advised to perform stretching exercises, Ice and to make appropriate shoe gear changes to include wearing supportive shoes that do not irritate the back of the heel. Patient also given written instructions on how to correctly perform the stretching of the Achilles tendon/calf stretches.  Limit strenuous activity for the next couple of weeks.   - Patient given Handout on Achilles Insertional Tendonitis.  - Dispensed Heel lifts and achilles gel sleeve.  - Recommended OTC night splint  - Rx for relafen  -Discussed appropriate care of calluses. Recommended gentle pumice stone or foot file after bathing followed by AmLactin or urea 40% twice daily.  - All questions were answered to the patients satisfaction.  - RTC in 8 weeks if symptoms worsen or fail to resolve.             This document has been electronically signed by Kenny Batista DPM on February 9, 2022 15:02 CST     2/9/2022  15:02 CST

## 2022-02-08 ENCOUNTER — PATIENT ROUNDING (BHMG ONLY) (OUTPATIENT)
Dept: PODIATRY | Facility: CLINIC | Age: 47
End: 2022-02-08

## 2022-02-08 NOTE — PROGRESS NOTES
February 8, 2022    Hello, may I speak with Suhail Gamino?    My name is OJ LINDSEY     I am  with [facility]  [address]   [place] [address] [phone].    Before we get started may I verify your date of birth? 1975    I am calling to officially welcome you to our practice and ask about your recent visit. Is this a good time to talk? no    UNABLE TO CONTACT PATIENT LEFT MESSAGE WELCOMING TO PRACTICE, AND IF HE HAS ANY QUESTIONS OR CONCERNS, HE MAY CALL US [phone]      Thank you, and have a great day.

## 2022-02-10 ENCOUNTER — TELEPHONE (OUTPATIENT)
Dept: FAMILY MEDICINE CLINIC | Facility: CLINIC | Age: 47
End: 2022-02-10

## 2022-02-10 DIAGNOSIS — K82.8 SLUDGE IN GALLBLADDER: Primary | ICD-10-CM

## 2022-02-10 DIAGNOSIS — K76.0 FATTY INFILTRATION OF LIVER: ICD-10-CM

## 2022-02-10 NOTE — TELEPHONE ENCOUNTER
Champ Sotomayor MD   2/10/2022  6:51 AM CST Back to Top        Notify patient that his gallbladder ultrasound shows some sludge in the gallbladder, but no evidence of cholecystitis.  There is also some fatty infiltration of the liver.  I recommend HIDA scan for further evaluation of his gallbladder.     Read Dr. Sotomayor's message to patient and he voiced understanding. I advised him they would call the schedule after the PA was approved.

## 2022-03-03 DIAGNOSIS — F41.0 PANIC ATTACKS: ICD-10-CM

## 2022-03-03 DIAGNOSIS — F41.1 GENERALIZED ANXIETY DISORDER: Chronic | ICD-10-CM

## 2022-03-03 RX ORDER — CLONAZEPAM 0.5 MG/1
TABLET ORAL
Qty: 60 TABLET | Refills: 0 | Status: SHIPPED | OUTPATIENT
Start: 2022-03-03 | End: 2022-04-07

## 2022-03-07 DIAGNOSIS — I42.2 HYPERTROPHIC CARDIOMYOPATHY: ICD-10-CM

## 2022-03-07 RX ORDER — METOPROLOL SUCCINATE 50 MG/1
50 TABLET, EXTENDED RELEASE ORAL EVERY EVENING
Qty: 30 TABLET | Refills: 0 | Status: SHIPPED | OUTPATIENT
Start: 2022-03-07 | End: 2022-03-07

## 2022-03-07 RX ORDER — METOPROLOL SUCCINATE 50 MG/1
TABLET, EXTENDED RELEASE ORAL
Qty: 90 TABLET | Refills: 1 | Status: SHIPPED | OUTPATIENT
Start: 2022-03-07 | End: 2022-09-08

## 2022-03-29 ENCOUNTER — TELEPHONE (OUTPATIENT)
Dept: FAMILY MEDICINE CLINIC | Facility: CLINIC | Age: 47
End: 2022-03-29

## 2022-03-29 DIAGNOSIS — K82.8 GALLBLADDER SLUDGE: ICD-10-CM

## 2022-03-29 DIAGNOSIS — K76.0 FATTY LIVER: ICD-10-CM

## 2022-03-29 DIAGNOSIS — R94.8 ABNORMAL BILIARY HIDA SCAN: Primary | ICD-10-CM

## 2022-03-29 NOTE — TELEPHONE ENCOUNTER
----- Message from Champ Sotomayor MD sent at 3/29/2022  1:01 PM CDT -----  Notify patient that his HIDA scan shows decreased gallbladder ejection fraction.  He needs referral to surgeon of choice for consult.

## 2022-03-29 NOTE — TELEPHONE ENCOUNTER
I read Dr Sotomayor's message to the patient. The patient voiced understanding, had no additional questions, and thanked me for the call.

## 2022-04-05 DIAGNOSIS — J30.1 CHRONIC SEASONAL ALLERGIC RHINITIS DUE TO POLLEN: Chronic | ICD-10-CM

## 2022-04-05 RX ORDER — FEXOFENADINE HCL 180 MG/1
180 TABLET ORAL DAILY
Qty: 30 TABLET | Refills: 0 | Status: SHIPPED | OUTPATIENT
Start: 2022-04-05 | End: 2022-05-12 | Stop reason: SDUPTHER

## 2022-04-07 ENCOUNTER — TELEPHONE (OUTPATIENT)
Dept: FAMILY MEDICINE CLINIC | Facility: CLINIC | Age: 47
End: 2022-04-07

## 2022-04-07 DIAGNOSIS — F41.0 PANIC ATTACKS: ICD-10-CM

## 2022-04-07 DIAGNOSIS — F41.1 GENERALIZED ANXIETY DISORDER: Chronic | ICD-10-CM

## 2022-04-07 RX ORDER — CLONAZEPAM 0.5 MG/1
TABLET ORAL
Qty: 60 TABLET | Refills: 0 | Status: SHIPPED | OUTPATIENT
Start: 2022-04-07 | End: 2022-05-10

## 2022-04-07 NOTE — TELEPHONE ENCOUNTER
1 month refill supply of clonazepam is sent to pharmacy in absence of Dr. Sotomayor, patient's primary care physician.ABDULLAHI is reviewed today and is appropriate.    This document has been electronically signed by EDELMIRA Jeff on April 7, 2022 10:38 CDT

## 2022-04-08 ENCOUNTER — OFFICE VISIT (OUTPATIENT)
Dept: CARDIOLOGY | Facility: CLINIC | Age: 47
End: 2022-04-08

## 2022-04-08 VITALS
BODY MASS INDEX: 39.17 KG/M2 | DIASTOLIC BLOOD PRESSURE: 82 MMHG | WEIGHT: 315 LBS | TEMPERATURE: 96.8 F | OXYGEN SATURATION: 98 % | SYSTOLIC BLOOD PRESSURE: 142 MMHG | HEART RATE: 71 BPM | HEIGHT: 75 IN

## 2022-04-08 DIAGNOSIS — I42.2 HYPERTROPHIC CARDIOMYOPATHY: Primary | ICD-10-CM

## 2022-04-08 DIAGNOSIS — I47.1 PAROXYSMAL SVT (SUPRAVENTRICULAR TACHYCARDIA): ICD-10-CM

## 2022-04-08 DIAGNOSIS — Q23.1 BICUSPID AORTIC VALVE DETERMINED BY IMAGING: ICD-10-CM

## 2022-04-08 DIAGNOSIS — I77.810 AORTIC ECTASIA, THORACIC: ICD-10-CM

## 2022-04-08 PROCEDURE — 99214 OFFICE O/P EST MOD 30 MIN: CPT | Performed by: INTERNAL MEDICINE

## 2022-04-08 RX ORDER — CLONAZEPAM 0.5 MG/1
1 TABLET ORAL
COMMUNITY
Start: 2022-03-03 | End: 2022-04-13

## 2022-04-08 NOTE — PROGRESS NOTES
Suhail Gamino  46 y.o. male    04/08/2022  Chief Complaint   Patient presents with   • Hypertrophic cardiomyopathy (CMS/HCC)   PSVT  Bicuspid aortic valve    History of Present Illness  Patient with a history of hypertrophic cardiomyopathy and PSVT    -        SUBJECTIVE  Patient seen Dr. Greene in the past.  This is my second visit.  He has a diagnosis of hypertrophic cardiomyopathy.  He has no chest pain.  He does feel occasional palpitations.  We have placed him on metoprolol and he said it was a little hard to get used to but since he has been on it he does feel better.  He has had no syncope.  He has no family history of sudden cardiac death.  He did have a ZIO Patch done by Dr. Greene who reported one episode of PSVT.  Today he is feeling well.  Allergies   Allergen Reactions   • Colcrys [Colchicine] Hives   • Diclofenac Other (See Comments)     Skin irritation, hot spots and sweating   • Mobic [Meloxicam] Palpitations         Past Medical History:   Diagnosis Date   • Abnormal EKG    • Achilles tendinitis, left leg    • Acute bronchitis    • Atrial fibrillation (HCC) 2002   • Callus    • Chest pain, atypical 9/25/2019   • Dyspnea on exertion 4/29/2020   • Fever    • Generalized anxiety disorder    • Gout    • Health examination of defined subpopulation    • Heart murmur 2002   • Heart valve disease 2019   • Hip pain    • Hypertension    • Ingrown toenail    • Lumbar back pain     Pain radiating to lumbar region of back   • Obesity    • On long term drug therapy    • Osteoarthritis of hip    • Other long term (current) drug therapy          Past Surgical History:   Procedure Laterality Date   • HIP SURGERY Right    • VASECTOMY     • WISDOM TOOTH EXTRACTION           Family History   Problem Relation Age of Onset   • Hyperlipidemia Father    • Hypertension Father    • Cancer Paternal Grandfather         Colorectal Cancer   • Heart disease Mother    • No Known Problems Sister    • Heart  disease Maternal Grandmother    • Diabetes Paternal Grandmother    • No Known Problems Sister    • No Known Problems Sister    • No Known Problems Son    • No Known Problems Son    • No Known Problems Daughter    • No Known Problems Daughter    • Cancer Maternal Aunt          Social History     Socioeconomic History   • Marital status:      Spouse name: Tomas   • Number of children: 2   Tobacco Use   • Smoking status: Former Smoker     Packs/day: 1.50     Years: 4.00     Pack years: 6.00     Types: Cigarettes     Start date: 1996     Quit date:      Years since quittin.2   • Smokeless tobacco: Current User     Types: Snuff   Vaping Use   • Vaping Use: Never used   Substance and Sexual Activity   • Alcohol use: No   • Drug use: No   • Sexual activity: Yes     Partners: Female     Birth control/protection: Surgical         Prior to Admission medications    Medication Sig Start Date End Date Taking? Authorizing Provider   allopurinol (ZYLOPRIM) 100 MG tablet TAKE 1 TABLET BY MOUTH DAILY 21  Yes Champ Sotomayor MD   busPIRone (BUSPAR) 10 MG tablet Take 1 tablet by mouth 3 (Three) Times a Day. 20  Yes Champ Sotomayor MD   clonazePAM (KlonoPIN) 0.5 MG tablet TAKE 1 TABLET BY MOUTH TWO TIMES A DAY AS NEEDED FOR ANXIETY (MAY CAUSE DROWSINESS) 22  Yes Raven Fitzpatrick APRN   clonazePAM (KlonoPIN) 0.5 MG tablet Take 1 mg by mouth. 3/3/22  Yes ProviderPako MD   cyclobenzaprine (FLEXERIL) 10 MG tablet TAKE 1 TABLET BY MOUTH THREE TIMES A DAY AS NEEDED FOR MUSCLE SPASMS 21  Yes Champ Sotomayor MD   fexofenadine (ALLEGRA) 180 MG tablet TAKE 1 TABLET BY MOUTH DAILY 22  Yes Corine Summers MD   FLUoxetine (PROzac) 20 MG capsule TAKE 1 CAPSULE BY MOUTH DAILY 22  Yes Champ Sotomayor MD   metoprolol succinate XL (TOPROL-XL) 50 MG 24 hr tablet TAKE 1 TABLET BY MOUTH EVERY EVENING 3/7/22  Yes Champ Sotomayor MD   nabumetone (Relafen) 750 MG tablet Take 1 tablet by mouth  "2 (Two) Times a Day. 2/7/22  Yes Kenny Batista L, DPM         OBJECTIVE    /82 (BP Location: Left arm, Patient Position: Sitting, Cuff Size: Adult)   Pulse 71   Temp 96.8 °F (36 °C)   Ht 190.5 cm (75\")   Wt (!) 158 kg (348 lb 11.2 oz)   SpO2 98%   BMI 43.58 kg/m²         Review of Systems     Constitutional:  Denies recent weight loss, weight gain, fever or chills, no change in exercise tolerance     HENT:  Denies any hearing loss, epistaxis, hoarseness, or difficulty speaking.     Eyes: Wears eyeglasses or contact lenses     Respiratory:  Denies dyspnea with exertion,no cough, wheezing, or hemoptysis.     Cardiovascular: Negative for palpations, chest pain, orthopnea, PND, peripheral edema, syncope, or claudication.     Gastrointestinal:  Denies change in bowel habits, dyspepsia, ulcer disease, hematochezia, or melena.     Endocrine: Negative for cold intolerance, heat intolerance, polydipsia, polyphagia and polyuria. Denies any history of weight change, heat/cold intolerance, polydipsia, polyuria     Genitourinary: Negative.      Musculoskeletal: Denies any history of arthritic symptoms or back problems     Skin:  Denies any change in hair or nails, rashes, or skin lesions.     Allergic/Immunologic: Negative.  Negative for environmental allergies, food allergies and immunocompromised state.     Neurological:  Denies any history of recurrent headaches, strokes, TIA, or seizure disorder.     Hematological: Denies any food allergies, seasonal allergies, bleeding disorders, or lymphadenopathy.     Psychiatric/Behavioral: Denies any history of depression, substance abuse, or change in cognitive function.         Physical Exam     Constitutional: Cooperative, alert and oriented, well-developed, well-nourished, in no acute distress.     HENT:   Head: Normocephalic, normal hair patterns, no masses or tenderness.  Ears, Nose, and Throat: No gross abnormalities. No pallor or cyanosis. Dentition good.   Eyes: " EOMS intact, PERRL, conjunctivae and lids unremarkable. Fundoscopic exam and visual fields not performed.   Neck: No palpable masses or adenopathy, no thyromegaly, no JVD, carotid pulses are full and equal bilaterally and without  Bruits.     Cardiovascular: Regular rhythm, S1 and S2 normal, no S3 or S4. Apical impulse not displaced.  1/6 systolic murmur at left sternal border gallops, or rubs detected.     Pulmonary/Chest: Chest: normal symmetry, no tenderness to palpation, normal respiratory excursion, no intercostal retraction, no use of accessory muscles.            Pulmonary: Normal breath sounds. No rales or ronchi.    Abdominal: Abdomen soft, bowel sounds normoactive, no masses, no hepatosplenomegaly, non-tender, no bruits.     Musculoskeletal: No deformities, clubbing, cyanosis, erythema, or edema observed. There are no spinal abnormalities noted. Normal muscle strength and tone. Pulses full and equal in all extremities, no bruits auscultated.     Neurological: No gross motor or sensory deficits noted, affect appropriate, oriented to time, person, place.     Skin: Warm and dry to the touch, no apparent skin lesions or masses noted.     Psychiatric: He has a normal mood and affect. His behavior is normal. Judgment and thought content normal.         Procedures      Lab Results   Component Value Date    WBC 9.66 12/29/2021    HGB 14.9 12/29/2021    HCT 44.9 12/29/2021    MCV 90.3 12/29/2021     12/29/2021     Lab Results   Component Value Date    GLUCOSE 109 (H) 12/29/2021    BUN 16 03/17/2022    CREATININE 1.0 03/17/2022    EGFRIFNONA 88 12/29/2021    BCR 18.3 12/29/2021    CO2 33.0 (H) 12/29/2021    CALCIUM 9.1 03/17/2022    ALBUMIN 4.1 03/17/2022    AST 22 03/17/2022    ALT 28 03/17/2022     Lab Results   Component Value Date    CHOL 170 04/12/2021    CHOL 179 08/28/2020    CHOL 163 04/29/2019     Lab Results   Component Value Date    TRIG 106 04/12/2021    TRIG 100 08/28/2020    TRIG 47 04/29/2019      Lab Results   Component Value Date    HDL 41 04/12/2021    HDL 50 08/28/2020    HDL 52 04/29/2019     No components found for: LDLCALC  Lab Results   Component Value Date     (H) 12/29/2021     (H) 04/12/2021     (H) 08/28/2020     No results found for: HDLLDLRATIO  No components found for: CHOLHDL  No results found for: HGBA1C  Lab Results   Component Value Date    TSH 1.120 12/29/2021           ASSESSMENT AND PLAN      Diagnoses and all orders for this visit:    1. Hypertrophic cardiomyopathy (HCC) (Primary)    2. Paroxysmal SVT (supraventricular tachycardia) (HCC)    3. Bicuspid aortic valve determined by imaging    4. Aortic ectasia, thoracic (HCC)    The patient is stable at this point.  Does have a history of hypertrophic cardiomyopathy with one episode PSVT.  Also has a imaging history suggestive of a bicuspid aortic valve with mild gradient increase.  I would like for him to see Dr. Vera to evaluate him for long-term therapy.  He has no family history of sudden cardiac death but given his hypertrophic cardiomyopathy and history of SVT would like for him to see if antiarrhythmic drug might be better versus a EP evaluation versus a implantable device.    He is encouraged to call us if there is any issues.  He is agreeable to see Dr. Vera.    Patient's Body mass index is 43.58 kg/m². indicating that he is morbidly obese (BMI > 40 or > 35 with obesity - related health condition). Obesity-related health conditions include the following: none. Obesity is unchanged. BMI is is above average; no BMI management plan is appropriate. We discussed portion control..        Follow-up with Dr. Vera for first appointment and then they will decide when to see him again.        Laura Orozco MD  4/8/2022  09:23 CDT

## 2022-04-13 ENCOUNTER — OFFICE VISIT (OUTPATIENT)
Dept: PODIATRY | Facility: CLINIC | Age: 47
End: 2022-04-13

## 2022-04-13 VITALS — HEIGHT: 75 IN | OXYGEN SATURATION: 98 % | BODY MASS INDEX: 39.17 KG/M2 | WEIGHT: 315 LBS | HEART RATE: 82 BPM

## 2022-04-13 DIAGNOSIS — M72.2 PLANTAR FASCIITIS: ICD-10-CM

## 2022-04-13 DIAGNOSIS — M79.672 LEFT FOOT PAIN: Primary | ICD-10-CM

## 2022-04-13 PROCEDURE — 20550 NJX 1 TENDON SHEATH/LIGAMENT: CPT | Performed by: PODIATRIST

## 2022-04-13 RX ORDER — TRIAMCINOLONE ACETONIDE 40 MG/ML
40 INJECTION, SUSPENSION INTRA-ARTICULAR; INTRAMUSCULAR ONCE
Status: COMPLETED | OUTPATIENT
Start: 2022-04-13 | End: 2022-04-13

## 2022-04-13 RX ORDER — DEXAMETHASONE SODIUM PHOSPHATE 4 MG/ML
4 INJECTION, SOLUTION INTRA-ARTICULAR; INTRALESIONAL; INTRAMUSCULAR; INTRAVENOUS; SOFT TISSUE ONCE
Status: COMPLETED | OUTPATIENT
Start: 2022-04-13 | End: 2022-04-13

## 2022-04-13 RX ADMIN — TRIAMCINOLONE ACETONIDE 40 MG: 40 INJECTION, SUSPENSION INTRA-ARTICULAR; INTRAMUSCULAR at 12:24

## 2022-04-13 RX ADMIN — DEXAMETHASONE SODIUM PHOSPHATE 4 MG: 4 INJECTION, SOLUTION INTRA-ARTICULAR; INTRALESIONAL; INTRAMUSCULAR; INTRAVENOUS; SOFT TISSUE at 12:24

## 2022-04-13 NOTE — PROGRESS NOTES
Suhail Gamino  1975  46 y.o. male     04/13/2022    Chief Complaint   Patient presents with   • Left Foot - Follow-up     History of Present Illness    Suhail Gamino is a 46 y.o.male who presents to clinic with chief complaint of left foot pain.  Pain is located to the bottom of the heel.    Past Medical History:   Diagnosis Date   • Abnormal EKG    • Achilles tendinitis, left leg    • Acute bronchitis    • Atrial fibrillation (HCC) 2002   • Callus    • Chest pain, atypical 9/25/2019   • Dyspnea on exertion 4/29/2020   • Fever    • Generalized anxiety disorder    • Gout    • Health examination of defined subpopulation    • Heart murmur 2002   • Heart valve disease 2019   • Hip pain    • Hypertension    • Ingrown toenail    • Lumbar back pain     Pain radiating to lumbar region of back   • Obesity    • On long term drug therapy    • Osteoarthritis of hip    • Other long term (current) drug therapy          Past Surgical History:   Procedure Laterality Date   • HIP SURGERY Right    • VASECTOMY     • WISDOM TOOTH EXTRACTION           Family History   Problem Relation Age of Onset   • Hyperlipidemia Father    • Hypertension Father    • Cancer Paternal Grandfather         Colorectal Cancer   • Heart disease Mother    • No Known Problems Sister    • Heart disease Maternal Grandmother    • Diabetes Paternal Grandmother    • No Known Problems Sister    • No Known Problems Sister    • No Known Problems Son    • No Known Problems Son    • No Known Problems Daughter    • No Known Problems Daughter    • Cancer Maternal Aunt        Allergies   Allergen Reactions   • Colcrys [Colchicine] Hives   • Diclofenac Other (See Comments)     Skin irritation, hot spots and sweating   • Mobic [Meloxicam] Palpitations       Social History     Socioeconomic History   • Marital status:      Spouse name: Tomas   • Number of children: 2   Tobacco Use   • Smoking status: Former Smoker     Packs/day: 1.50      "Years: 4.00     Pack years: 6.00     Types: Cigarettes     Start date: 1996     Quit date:      Years since quittin.2   • Smokeless tobacco: Current User     Types: Snuff   Vaping Use   • Vaping Use: Never used   Substance and Sexual Activity   • Alcohol use: No   • Drug use: No   • Sexual activity: Yes     Partners: Female     Birth control/protection: Surgical         Current Outpatient Medications   Medication Sig Dispense Refill   • allopurinol (ZYLOPRIM) 100 MG tablet TAKE 1 TABLET BY MOUTH DAILY 30 tablet 5   • busPIRone (BUSPAR) 10 MG tablet Take 1 tablet by mouth 3 (Three) Times a Day. 90 tablet 5   • clonazePAM (KlonoPIN) 0.5 MG tablet TAKE 1 TABLET BY MOUTH TWO TIMES A DAY AS NEEDED FOR ANXIETY (MAY CAUSE DROWSINESS) 60 tablet 0   • cyclobenzaprine (FLEXERIL) 10 MG tablet TAKE 1 TABLET BY MOUTH THREE TIMES A DAY AS NEEDED FOR MUSCLE SPASMS 60 tablet 5   • fexofenadine (ALLEGRA) 180 MG tablet TAKE 1 TABLET BY MOUTH DAILY 30 tablet 0   • FLUoxetine (PROzac) 20 MG capsule TAKE 1 CAPSULE BY MOUTH DAILY 30 capsule 5   • metoprolol succinate XL (TOPROL-XL) 50 MG 24 hr tablet TAKE 1 TABLET BY MOUTH EVERY EVENING 90 tablet 1   • nabumetone (Relafen) 750 MG tablet Take 1 tablet by mouth 2 (Two) Times a Day. 60 tablet 1     No current facility-administered medications for this visit.     Review of Systems   Constitutional: Negative.    HENT: Negative.    Respiratory: Negative.    Cardiovascular: Negative.    Gastrointestinal: Negative.    Musculoskeletal:        Foot pain   Skin: Negative.    Psychiatric/Behavioral: Negative.          OBJECTIVE    Pulse 82   Ht 190.5 cm (75\")   Wt (!) 158 kg (348 lb)   SpO2 98%   BMI 43.50 kg/m²       Physical Exam  Vitals reviewed.   Constitutional:       General: He is not in acute distress.     Appearance: He is well-developed.   HENT:      Head: Normocephalic and atraumatic.      Nose: Nose normal.   Eyes:      Conjunctiva/sclera: Conjunctivae normal.      " Pupils: Pupils are equal, round, and reactive to light.   Cardiovascular:      Pulses:           Dorsalis pedis pulses are 2+ on the right side and 2+ on the left side.        Posterior tibial pulses are 2+ on the right side and 2+ on the left side.   Pulmonary:      Effort: Pulmonary effort is normal. No respiratory distress.      Breath sounds: No wheezing.   Musculoskeletal:         General: Tenderness present. No deformity. Normal range of motion.      Right foot: Normal range of motion. No deformity.      Left foot: Normal range of motion. No deformity.        Feet:    Feet:      Right foot:      Protective Sensation: 5 sites tested. 5 sites sensed.      Skin integrity: Callus and dry skin present.      Left foot:      Protective Sensation: 5 sites tested. 5 sites sensed.      Skin integrity: Callus and dry skin present.   Skin:     General: Skin is warm and dry.      Capillary Refill: Capillary refill takes less than 2 seconds.   Neurological:      Mental Status: He is alert and oriented to person, place, and time.   Psychiatric:         Behavior: Behavior normal.         Thought Content: Thought content normal.               Procedures    Plantar Fasciits Injection  Date/Time: 04/13/2022  Performed by: AI TREJO  Authorized by: AI TREJO   Consent: Verbal consent obtained. Written consent obtained.  Risks and benefits: risks, benefits and alternatives were discussed  Consent given by: patient  Patient identity confirmed: verbally with patient  Indications: pain relief  Nerve block body site: left heel.  Sedation:  Patient sedated: no    Patient position: sitting  Needle size: 27 G  Local anesthetic: 0.5% Marcaine plain, Kenalog 40 mg/ml , Decadron 4 mg/mL.   Outcome: pain improved  Patient tolerance: Patient tolerated the procedure well with no immediate complications     ASSESSMENT AND PLAN    Diagnoses and all orders for this visit:    1. Left foot pain (Primary)    2. Plantar  fasciitis      -Steroid injection left heel  -Continue stretching and custom inserts  -Continue Relafen  - All questions were answered to the patients satisfaction.  - RTC in 8 weeks if symptoms worsen or fail to resolve.             This document has been electronically signed by Kenny Batista DPM on April 13, 2022 10:19 CDT     4/13/2022  10:19 CDT

## 2022-04-18 ENCOUNTER — OFFICE VISIT (OUTPATIENT)
Dept: SURGERY | Facility: CLINIC | Age: 47
End: 2022-04-18

## 2022-04-18 VITALS
TEMPERATURE: 96.6 F | HEIGHT: 75 IN | DIASTOLIC BLOOD PRESSURE: 92 MMHG | BODY MASS INDEX: 39.17 KG/M2 | HEART RATE: 74 BPM | WEIGHT: 315 LBS | SYSTOLIC BLOOD PRESSURE: 154 MMHG

## 2022-04-18 DIAGNOSIS — K81.9 CHOLECYSTITIS: Primary | ICD-10-CM

## 2022-04-18 PROCEDURE — 99204 OFFICE O/P NEW MOD 45 MIN: CPT | Performed by: SURGERY

## 2022-04-18 NOTE — PROGRESS NOTES
Subjective   Suhail Gamino is a 46 y.o. male     Chief Complaint: Right upper quadrant pain    History of Present Illness referred for intermittent right upper quadrant pain and work-up demonstrates that he likely has biliary dyskinesia.  Patient's had intermittent right upper quadrant pain for months.  It varies in intensity.  Sometimes happens after he eats and sometimes awakens him from sleep.  Does not avoid any certain food.  He does occasionally have diarrhea but is not a constant issue for him.  And CT scan done over at [facility] which was unremarkable as far as the biliary tree or liver were concerned.  He is recently had a HIDA scan which demonstrated visualization of the gallbladder but ejection fraction is only 3%.  No prior abdominal surgery.  No history of being jaundiced or pancreatitis LFTs are normal    Review of Systems   Constitutional: Negative.    HENT: Negative.    Eyes: Negative.    Respiratory: Negative.    Cardiovascular: Negative.    Gastrointestinal: Positive for abdominal pain, constipation, diarrhea and nausea.        RUQ   Endocrine: Negative.    Genitourinary: Negative.    Musculoskeletal: Negative.    Skin: Negative.    Allergic/Immunologic: Negative.    Neurological: Negative.    Hematological: Negative.    Psychiatric/Behavioral: Negative.      Past Medical History:   Diagnosis Date   • Abnormal EKG    • Achilles tendinitis, left leg    • Acute bronchitis    • Atrial fibrillation (HCC) 2002   • Callus    • Chest pain, atypical 9/25/2019   • Dyspnea on exertion 4/29/2020   • Fever    • Generalized anxiety disorder    • Gout    • Health examination of defined subpopulation    • Heart murmur 2002   • Heart valve disease 2019   • Hip pain    • Hypertension    • Ingrown toenail    • Lumbar back pain     Pain radiating to lumbar region of back   • Obesity    • On long term drug therapy    • Osteoarthritis of hip    • Other long term (current) drug therapy      Past Surgical  History:   Procedure Laterality Date   • HIP SURGERY Right    • VASECTOMY     • WISDOM TOOTH EXTRACTION       Family History   Problem Relation Age of Onset   • Hyperlipidemia Father    • Hypertension Father    • Cancer Paternal Grandfather         Colorectal Cancer   • Heart disease Mother    • No Known Problems Sister    • Heart disease Maternal Grandmother    • Diabetes Paternal Grandmother    • No Known Problems Sister    • No Known Problems Sister    • No Known Problems Son    • No Known Problems Son    • No Known Problems Daughter    • No Known Problems Daughter    • Cancer Maternal Aunt      Social History     Socioeconomic History   • Marital status:      Spouse name: Tomas   • Number of children: 2   Tobacco Use   • Smoking status: Former Smoker     Packs/day: 1.50     Years: 4.00     Pack years: 6.00     Types: Cigarettes     Start date: 1996     Quit date:      Years since quittin.3   • Smokeless tobacco: Current User     Types: Snuff   Vaping Use   • Vaping Use: Never used   Substance and Sexual Activity   • Alcohol use: No   • Drug use: No   • Sexual activity: Yes     Partners: Female     Birth control/protection: Surgical     Allergies   Allergen Reactions   • Colcrys [Colchicine] Hives   • Diclofenac Other (See Comments)     Skin irritation, hot spots and sweating   • Mobic [Meloxicam] Palpitations     Vitals:    22 1204   BP: 154/92   Pulse: 74   Temp: 96.6 °F (35.9 °C)       Home Medications:  Prior to Admission medications    Medication Sig Start Date End Date Taking? Authorizing Provider   allopurinol (ZYLOPRIM) 100 MG tablet TAKE 1 TABLET BY MOUTH DAILY 21  Yes Champ Sotomayor MD   busPIRone (BUSPAR) 10 MG tablet Take 1 tablet by mouth 3 (Three) Times a Day. 20  Yes Champ Sotomayor MD   clonazePAM (KlonoPIN) 0.5 MG tablet TAKE 1 TABLET BY MOUTH TWO TIMES A DAY AS NEEDED FOR ANXIETY (MAY CAUSE DROWSINESS) 22  Yes Raven Fitzpatrick APRN    cyclobenzaprine (FLEXERIL) 10 MG tablet TAKE 1 TABLET BY MOUTH THREE TIMES A DAY AS NEEDED FOR MUSCLE SPASMS 6/8/21  Yes Champ Sotomayor MD   fexofenadine (ALLEGRA) 180 MG tablet TAKE 1 TABLET BY MOUTH DAILY 4/5/22  Yes Corine Summers MD   FLUoxetine (PROzac) 20 MG capsule TAKE 1 CAPSULE BY MOUTH DAILY 2/4/22  Yes Champ Sotomayor MD   metoprolol succinate XL (TOPROL-XL) 50 MG 24 hr tablet TAKE 1 TABLET BY MOUTH EVERY EVENING 3/7/22  Yes Champ Sotomayor MD   nabumetone (Relafen) 750 MG tablet Take 1 tablet by mouth 2 (Two) Times a Day. 2/7/22  Yes Kenny Batista DPM       Objective   Physical Exam  Constitutional:       General: He is not in acute distress.     Appearance: He is well-developed.   HENT:      Head: Normocephalic and atraumatic.   Eyes:      General: No scleral icterus.     Conjunctiva/sclera: Conjunctivae normal.   Neck:      Thyroid: No thyromegaly.      Trachea: No tracheal deviation.   Cardiovascular:      Rate and Rhythm: Normal rate and regular rhythm.      Heart sounds: Normal heart sounds. No murmur heard.    No friction rub. No gallop.   Pulmonary:      Effort: Pulmonary effort is normal. No respiratory distress.      Breath sounds: Normal breath sounds. No stridor. No wheezing or rales.   Chest:      Chest wall: No tenderness.   Abdominal:      General: Bowel sounds are normal. There is no distension.      Palpations: Abdomen is soft. There is no mass.      Tenderness: There is no abdominal tenderness. There is no guarding or rebound.      Hernia: No hernia is present.   Musculoskeletal:         General: No deformity. Normal range of motion.      Cervical back: Normal range of motion and neck supple.   Lymphadenopathy:      Cervical: No cervical adenopathy.   Skin:     General: Skin is warm and dry.      Coloration: Skin is not pale.      Findings: No erythema or rash.      Nails: There is no clubbing.   Neurological:      Mental Status: He is alert and oriented to person, place, and  time.   Psychiatric:         Behavior: Behavior normal.         Thought Content: Thought content normal.         Assessment/Plan Cholecystitis and/or biliary dyskinesia.  Fully discussed the diagnosis using a pamphlet with the patient and his wife.  Recommend laparoscopic ostectomy intraoperative cholangiogram.  Fully discussed the procedure alternatives risk and benefits.  He clearly understands and wishes to proceed.  We discussed what to expect postoperatively and the fact that it could make his diarrhea worse.  He understands he likely does not have gallstones and what the diagnosis of biliary dyskinesia means.      The encounter diagnosis was Cholecystitis.                     This document has been electronically signed by Marcus Hollis MD on April 18, 2022 12:42 CDT

## 2022-04-18 NOTE — H&P (VIEW-ONLY)
Subjective   Suhail Gamino is a 46 y.o. male     Chief Complaint: Right upper quadrant pain    History of Present Illness referred for intermittent right upper quadrant pain and work-up demonstrates that he likely has biliary dyskinesia.  Patient's had intermittent right upper quadrant pain for months.  It varies in intensity.  Sometimes happens after he eats and sometimes awakens him from sleep.  Does not avoid any certain food.  He does occasionally have diarrhea but is not a constant issue for him.  And CT scan done over at [place] which was unremarkable as far as the biliary tree or liver were concerned.  He is recently had a HIDA scan which demonstrated visualization of the gallbladder but ejection fraction is only 3%.  No prior abdominal surgery.  No history of being jaundiced or pancreatitis LFTs are normal    Review of Systems   Constitutional: Negative.    HENT: Negative.    Eyes: Negative.    Respiratory: Negative.    Cardiovascular: Negative.    Gastrointestinal: Positive for abdominal pain, constipation, diarrhea and nausea.        RUQ   Endocrine: Negative.    Genitourinary: Negative.    Musculoskeletal: Negative.    Skin: Negative.    Allergic/Immunologic: Negative.    Neurological: Negative.    Hematological: Negative.    Psychiatric/Behavioral: Negative.      Past Medical History:   Diagnosis Date   • Abnormal EKG    • Achilles tendinitis, left leg    • Acute bronchitis    • Atrial fibrillation (HCC) 2002   • Callus    • Chest pain, atypical 9/25/2019   • Dyspnea on exertion 4/29/2020   • Fever    • Generalized anxiety disorder    • Gout    • Health examination of defined subpopulation    • Heart murmur 2002   • Heart valve disease 2019   • Hip pain    • Hypertension    • Ingrown toenail    • Lumbar back pain     Pain radiating to lumbar region of back   • Obesity    • On long term drug therapy    • Osteoarthritis of hip    • Other long term (current) drug therapy      Past Surgical  History:   Procedure Laterality Date   • HIP SURGERY Right    • VASECTOMY     • WISDOM TOOTH EXTRACTION       Family History   Problem Relation Age of Onset   • Hyperlipidemia Father    • Hypertension Father    • Cancer Paternal Grandfather         Colorectal Cancer   • Heart disease Mother    • No Known Problems Sister    • Heart disease Maternal Grandmother    • Diabetes Paternal Grandmother    • No Known Problems Sister    • No Known Problems Sister    • No Known Problems Son    • No Known Problems Son    • No Known Problems Daughter    • No Known Problems Daughter    • Cancer Maternal Aunt      Social History     Socioeconomic History   • Marital status:      Spouse name: Tomas   • Number of children: 2   Tobacco Use   • Smoking status: Former Smoker     Packs/day: 1.50     Years: 4.00     Pack years: 6.00     Types: Cigarettes     Start date: 1996     Quit date:      Years since quittin.3   • Smokeless tobacco: Current User     Types: Snuff   Vaping Use   • Vaping Use: Never used   Substance and Sexual Activity   • Alcohol use: No   • Drug use: No   • Sexual activity: Yes     Partners: Female     Birth control/protection: Surgical     Allergies   Allergen Reactions   • Colcrys [Colchicine] Hives   • Diclofenac Other (See Comments)     Skin irritation, hot spots and sweating   • Mobic [Meloxicam] Palpitations     Vitals:    22 1204   BP: 154/92   Pulse: 74   Temp: 96.6 °F (35.9 °C)       Home Medications:  Prior to Admission medications    Medication Sig Start Date End Date Taking? Authorizing Provider   allopurinol (ZYLOPRIM) 100 MG tablet TAKE 1 TABLET BY MOUTH DAILY 21  Yes Champ Sotomayor MD   busPIRone (BUSPAR) 10 MG tablet Take 1 tablet by mouth 3 (Three) Times a Day. 20  Yes Champ Sotomayor MD   clonazePAM (KlonoPIN) 0.5 MG tablet TAKE 1 TABLET BY MOUTH TWO TIMES A DAY AS NEEDED FOR ANXIETY (MAY CAUSE DROWSINESS) 22  Yes Raven Fitzpatrick APRN    cyclobenzaprine (FLEXERIL) 10 MG tablet TAKE 1 TABLET BY MOUTH THREE TIMES A DAY AS NEEDED FOR MUSCLE SPASMS 6/8/21  Yes Champ Sotomayor MD   fexofenadine (ALLEGRA) 180 MG tablet TAKE 1 TABLET BY MOUTH DAILY 4/5/22  Yes Corine Summers MD   FLUoxetine (PROzac) 20 MG capsule TAKE 1 CAPSULE BY MOUTH DAILY 2/4/22  Yes Champ Sotomayor MD   metoprolol succinate XL (TOPROL-XL) 50 MG 24 hr tablet TAKE 1 TABLET BY MOUTH EVERY EVENING 3/7/22  Yes Champ Sotomayor MD   nabumetone (Relafen) 750 MG tablet Take 1 tablet by mouth 2 (Two) Times a Day. 2/7/22  Yes Kenny Batista DPM       Objective   Physical Exam  Constitutional:       General: He is not in acute distress.     Appearance: He is well-developed.   HENT:      Head: Normocephalic and atraumatic.   Eyes:      General: No scleral icterus.     Conjunctiva/sclera: Conjunctivae normal.   Neck:      Thyroid: No thyromegaly.      Trachea: No tracheal deviation.   Cardiovascular:      Rate and Rhythm: Normal rate and regular rhythm.      Heart sounds: Normal heart sounds. No murmur heard.    No friction rub. No gallop.   Pulmonary:      Effort: Pulmonary effort is normal. No respiratory distress.      Breath sounds: Normal breath sounds. No stridor. No wheezing or rales.   Chest:      Chest wall: No tenderness.   Abdominal:      General: Bowel sounds are normal. There is no distension.      Palpations: Abdomen is soft. There is no mass.      Tenderness: There is no abdominal tenderness. There is no guarding or rebound.      Hernia: No hernia is present.   Musculoskeletal:         General: No deformity. Normal range of motion.      Cervical back: Normal range of motion and neck supple.   Lymphadenopathy:      Cervical: No cervical adenopathy.   Skin:     General: Skin is warm and dry.      Coloration: Skin is not pale.      Findings: No erythema or rash.      Nails: There is no clubbing.   Neurological:      Mental Status: He is alert and oriented to person, place, and  time.   Psychiatric:         Behavior: Behavior normal.         Thought Content: Thought content normal.         Assessment/Plan Cholecystitis and/or biliary dyskinesia.  Fully discussed the diagnosis using a pamphlet with the patient and his wife.  Recommend laparoscopic ostectomy intraoperative cholangiogram.  Fully discussed the procedure alternatives risk and benefits.  He clearly understands and wishes to proceed.  We discussed what to expect postoperatively and the fact that it could make his diarrhea worse.  He understands he likely does not have gallstones and what the diagnosis of biliary dyskinesia means.      The encounter diagnosis was Cholecystitis.                     This document has been electronically signed by Marcus Hollis MD on April 18, 2022 12:42 CDT

## 2022-04-19 ENCOUNTER — LAB (OUTPATIENT)
Dept: LAB | Facility: HOSPITAL | Age: 47
End: 2022-04-19

## 2022-04-19 ENCOUNTER — PRE-ADMISSION TESTING (OUTPATIENT)
Dept: PREADMISSION TESTING | Facility: HOSPITAL | Age: 47
End: 2022-04-19

## 2022-04-19 VITALS
BODY MASS INDEX: 39.17 KG/M2 | HEART RATE: 68 BPM | HEIGHT: 75 IN | OXYGEN SATURATION: 97 % | WEIGHT: 315 LBS | RESPIRATION RATE: 16 BRPM | DIASTOLIC BLOOD PRESSURE: 78 MMHG | SYSTOLIC BLOOD PRESSURE: 132 MMHG

## 2022-04-19 DIAGNOSIS — K81.9 CHOLECYSTITIS: ICD-10-CM

## 2022-04-19 LAB
QT INTERVAL: 400 MS
QTC INTERVAL: 425 MS
SARS-COV-2 N GENE RESP QL NAA+PROBE: NOT DETECTED

## 2022-04-19 PROCEDURE — 93005 ELECTROCARDIOGRAM TRACING: CPT

## 2022-04-19 PROCEDURE — 93010 ELECTROCARDIOGRAM REPORT: CPT | Performed by: INTERNAL MEDICINE

## 2022-04-19 PROCEDURE — 87635 SARS-COV-2 COVID-19 AMP PRB: CPT

## 2022-04-19 PROCEDURE — C9803 HOPD COVID-19 SPEC COLLECT: HCPCS

## 2022-04-19 RX ORDER — SODIUM CHLORIDE, SODIUM GLUCONATE, SODIUM ACETATE, POTASSIUM CHLORIDE AND MAGNESIUM CHLORIDE 526; 502; 368; 37; 30 MG/100ML; MG/100ML; MG/100ML; MG/100ML; MG/100ML
1000 INJECTION, SOLUTION INTRAVENOUS CONTINUOUS PRN
Status: CANCELLED | OUTPATIENT
Start: 2022-04-21

## 2022-04-21 ENCOUNTER — APPOINTMENT (OUTPATIENT)
Dept: GENERAL RADIOLOGY | Facility: HOSPITAL | Age: 47
End: 2022-04-21

## 2022-04-21 ENCOUNTER — ANESTHESIA EVENT (OUTPATIENT)
Dept: PERIOP | Facility: HOSPITAL | Age: 47
End: 2022-04-21

## 2022-04-21 ENCOUNTER — ANESTHESIA (OUTPATIENT)
Dept: PERIOP | Facility: HOSPITAL | Age: 47
End: 2022-04-21

## 2022-04-21 ENCOUNTER — HOSPITAL ENCOUNTER (OUTPATIENT)
Facility: HOSPITAL | Age: 47
Setting detail: HOSPITAL OUTPATIENT SURGERY
Discharge: HOME | End: 2022-04-21
Attending: SURGERY | Admitting: SURGERY

## 2022-04-21 VITALS
OXYGEN SATURATION: 95 % | TEMPERATURE: 97 F | RESPIRATION RATE: 18 BRPM | BODY MASS INDEX: 38.36 KG/M2 | HEART RATE: 56 BPM | DIASTOLIC BLOOD PRESSURE: 74 MMHG | WEIGHT: 315 LBS | HEIGHT: 76 IN | SYSTOLIC BLOOD PRESSURE: 145 MMHG

## 2022-04-21 DIAGNOSIS — K81.9 CHOLECYSTITIS: ICD-10-CM

## 2022-04-21 PROCEDURE — 25010000002 SUCCINYLCHOLINE PER 20 MG: Performed by: NURSE ANESTHETIST, CERTIFIED REGISTERED

## 2022-04-21 PROCEDURE — 25010000002 DEXAMETHASONE PER 1 MG: Performed by: NURSE ANESTHETIST, CERTIFIED REGISTERED

## 2022-04-21 PROCEDURE — 25010000002 IOPAMIDOL 61 % SOLUTION: Performed by: SURGERY

## 2022-04-21 PROCEDURE — 76000 FLUOROSCOPY <1 HR PHYS/QHP: CPT

## 2022-04-21 PROCEDURE — 25010000002 HYDROMORPHONE PER 4 MG: Performed by: NURSE ANESTHETIST, CERTIFIED REGISTERED

## 2022-04-21 PROCEDURE — 47563 LAPARO CHOLECYSTECTOMY/GRAPH: CPT | Performed by: SPECIALIST/TECHNOLOGIST, OTHER

## 2022-04-21 PROCEDURE — 25010000002 ONDANSETRON PER 1 MG: Performed by: NURSE ANESTHETIST, CERTIFIED REGISTERED

## 2022-04-21 PROCEDURE — 25010000002 MIDAZOLAM PER 1 MG: Performed by: NURSE ANESTHETIST, CERTIFIED REGISTERED

## 2022-04-21 PROCEDURE — C1889 IMPLANT/INSERT DEVICE, NOC: HCPCS | Performed by: SURGERY

## 2022-04-21 PROCEDURE — 25010000002 NEOSTIGMINE 10 MG/10ML SOLUTION: Performed by: NURSE ANESTHETIST, CERTIFIED REGISTERED

## 2022-04-21 PROCEDURE — 25010000002 AMPICILLIN-SULBACTAM PER 1.5 G: Performed by: SURGERY

## 2022-04-21 PROCEDURE — 25010000002 PROPOFOL 10 MG/ML EMULSION: Performed by: NURSE ANESTHETIST, CERTIFIED REGISTERED

## 2022-04-21 PROCEDURE — 47563 LAPARO CHOLECYSTECTOMY/GRAPH: CPT | Performed by: SURGERY

## 2022-04-21 PROCEDURE — 74300 X-RAY BILE DUCTS/PANCREAS: CPT | Performed by: SURGERY

## 2022-04-21 PROCEDURE — 25010000002 HYDROMORPHONE 1 MG/ML SOLUTION: Performed by: NURSE ANESTHETIST, CERTIFIED REGISTERED

## 2022-04-21 DEVICE — LIGAMAX 5 MM ENDOSCOPIC MULTIPLE CLIP APPLIER
Type: IMPLANTABLE DEVICE | Site: ABDOMEN | Status: FUNCTIONAL
Brand: LIGAMAX

## 2022-04-21 RX ORDER — SUCCINYLCHOLINE CHLORIDE 20 MG/ML
INJECTION INTRAMUSCULAR; INTRAVENOUS AS NEEDED
Status: DISCONTINUED | OUTPATIENT
Start: 2022-04-21 | End: 2022-04-21 | Stop reason: SURG

## 2022-04-21 RX ORDER — ROCURONIUM BROMIDE 10 MG/ML
INJECTION, SOLUTION INTRAVENOUS AS NEEDED
Status: DISCONTINUED | OUTPATIENT
Start: 2022-04-21 | End: 2022-04-21 | Stop reason: SURG

## 2022-04-21 RX ORDER — SODIUM CHLORIDE, SODIUM GLUCONATE, SODIUM ACETATE, POTASSIUM CHLORIDE AND MAGNESIUM CHLORIDE 526; 502; 368; 37; 30 MG/100ML; MG/100ML; MG/100ML; MG/100ML; MG/100ML
1000 INJECTION, SOLUTION INTRAVENOUS CONTINUOUS PRN
Status: DISCONTINUED | OUTPATIENT
Start: 2022-04-21 | End: 2022-04-21 | Stop reason: HOSPADM

## 2022-04-21 RX ORDER — LIDOCAINE HYDROCHLORIDE 20 MG/ML
INJECTION, SOLUTION INFILTRATION; PERINEURAL AS NEEDED
Status: DISCONTINUED | OUTPATIENT
Start: 2022-04-21 | End: 2022-04-21 | Stop reason: SURG

## 2022-04-21 RX ORDER — DEXAMETHASONE SODIUM PHOSPHATE 4 MG/ML
INJECTION, SOLUTION INTRA-ARTICULAR; INTRALESIONAL; INTRAMUSCULAR; INTRAVENOUS; SOFT TISSUE AS NEEDED
Status: DISCONTINUED | OUTPATIENT
Start: 2022-04-21 | End: 2022-04-21 | Stop reason: SURG

## 2022-04-21 RX ORDER — HYDROMORPHONE HCL 110MG/55ML
PATIENT CONTROLLED ANALGESIA SYRINGE INTRAVENOUS AS NEEDED
Status: DISCONTINUED | OUTPATIENT
Start: 2022-04-21 | End: 2022-04-21 | Stop reason: SURG

## 2022-04-21 RX ORDER — HYDROCODONE BITARTRATE AND ACETAMINOPHEN 7.5; 325 MG/1; MG/1
1 TABLET ORAL EVERY 6 HOURS PRN
Qty: 12 TABLET | Refills: 0 | Status: SHIPPED | OUTPATIENT
Start: 2022-04-21 | End: 2022-08-01

## 2022-04-21 RX ORDER — MIDAZOLAM HYDROCHLORIDE 1 MG/ML
INJECTION INTRAMUSCULAR; INTRAVENOUS AS NEEDED
Status: DISCONTINUED | OUTPATIENT
Start: 2022-04-21 | End: 2022-04-21 | Stop reason: SURG

## 2022-04-21 RX ORDER — PROPOFOL 10 MG/ML
VIAL (ML) INTRAVENOUS AS NEEDED
Status: DISCONTINUED | OUTPATIENT
Start: 2022-04-21 | End: 2022-04-21 | Stop reason: SURG

## 2022-04-21 RX ORDER — METOPROLOL TARTRATE 5 MG/5ML
INJECTION INTRAVENOUS AS NEEDED
Status: DISCONTINUED | OUTPATIENT
Start: 2022-04-21 | End: 2022-04-21 | Stop reason: SURG

## 2022-04-21 RX ORDER — ONDANSETRON 2 MG/ML
INJECTION INTRAMUSCULAR; INTRAVENOUS AS NEEDED
Status: DISCONTINUED | OUTPATIENT
Start: 2022-04-21 | End: 2022-04-21 | Stop reason: SURG

## 2022-04-21 RX ORDER — MEPERIDINE HYDROCHLORIDE 25 MG/ML
12.5 INJECTION INTRAMUSCULAR; INTRAVENOUS; SUBCUTANEOUS
Status: DISCONTINUED | OUTPATIENT
Start: 2022-04-21 | End: 2022-04-21 | Stop reason: HOSPADM

## 2022-04-21 RX ORDER — NEOSTIGMINE METHYLSULFATE 1 MG/ML
INJECTION, SOLUTION INTRAVENOUS AS NEEDED
Status: DISCONTINUED | OUTPATIENT
Start: 2022-04-21 | End: 2022-04-21 | Stop reason: SURG

## 2022-04-21 RX ORDER — BUPIVACAINE HYDROCHLORIDE 2.5 MG/ML
INJECTION, SOLUTION EPIDURAL; INFILTRATION; INTRACAUDAL AS NEEDED
Status: DISCONTINUED | OUTPATIENT
Start: 2022-04-21 | End: 2022-04-21 | Stop reason: HOSPADM

## 2022-04-21 RX ORDER — HYDROCODONE BITARTRATE AND ACETAMINOPHEN 7.5; 325 MG/1; MG/1
1 TABLET ORAL ONCE AS NEEDED
Status: COMPLETED | OUTPATIENT
Start: 2022-04-21 | End: 2022-04-21

## 2022-04-21 RX ADMIN — ONDANSETRON 4 MG: 2 INJECTION INTRAMUSCULAR; INTRAVENOUS at 08:09

## 2022-04-21 RX ADMIN — MIDAZOLAM HYDROCHLORIDE 2 MG: 1 INJECTION, SOLUTION INTRAMUSCULAR; INTRAVENOUS at 07:04

## 2022-04-21 RX ADMIN — METOPROLOL TARTRATE 1 MG: 5 INJECTION INTRAVENOUS at 07:45

## 2022-04-21 RX ADMIN — HYDROMORPHONE HYDROCHLORIDE 0.5 MG: 1 INJECTION, SOLUTION INTRAMUSCULAR; INTRAVENOUS; SUBCUTANEOUS at 08:36

## 2022-04-21 RX ADMIN — HYDROMORPHONE HYDROCHLORIDE 0.5 MG: 2 INJECTION, SOLUTION INTRAMUSCULAR; INTRAVENOUS; SUBCUTANEOUS at 07:35

## 2022-04-21 RX ADMIN — HYDROMORPHONE HYDROCHLORIDE 1 MG: 2 INJECTION, SOLUTION INTRAMUSCULAR; INTRAVENOUS; SUBCUTANEOUS at 07:07

## 2022-04-21 RX ADMIN — SUCCINYLCHOLINE CHLORIDE 200 MG: 20 INJECTION, SOLUTION INTRAMUSCULAR; INTRAVENOUS at 07:08

## 2022-04-21 RX ADMIN — HYDROCODONE BITARTRATE AND ACETAMINOPHEN 1 TABLET: 7.5; 325 TABLET ORAL at 09:19

## 2022-04-21 RX ADMIN — ROCURONIUM BROMIDE 40 MG: 10 INJECTION INTRAVENOUS at 07:17

## 2022-04-21 RX ADMIN — SODIUM CHLORIDE, SODIUM GLUCONATE, SODIUM ACETATE, POTASSIUM CHLORIDE AND MAGNESIUM CHLORIDE 1000 ML: 526; 502; 368; 37; 30 INJECTION, SOLUTION INTRAVENOUS at 06:00

## 2022-04-21 RX ADMIN — DEXAMETHASONE SODIUM PHOSPHATE 4 MG: 4 INJECTION, SOLUTION INTRAMUSCULAR; INTRAVENOUS at 07:15

## 2022-04-21 RX ADMIN — Medication 3 G: at 07:16

## 2022-04-21 RX ADMIN — PROPOFOL 200 MG: 10 INJECTION, EMULSION INTRAVENOUS at 07:08

## 2022-04-21 RX ADMIN — NEOSTIGMINE METHYLSULFATE 2 MG: 0.5 INJECTION INTRAVENOUS at 08:10

## 2022-04-21 RX ADMIN — LIDOCAINE HYDROCHLORIDE 100 MG: 20 INJECTION, SOLUTION INFILTRATION; PERINEURAL at 08:17

## 2022-04-21 RX ADMIN — ROCURONIUM BROMIDE 10 MG: 10 INJECTION INTRAVENOUS at 07:07

## 2022-04-21 RX ADMIN — GLYCOPYRROLATE 0.2 MG: 0.2 INJECTION, SOLUTION INTRAMUSCULAR; INTRAVITREAL at 08:10

## 2022-04-21 NOTE — INTERVAL H&P NOTE
H&P reviewed. The patient was examined and there are no changes to the H&P.      Temp:  [96.8 °F (36 °C)] 96.8 °F (36 °C)  Heart Rate:  [66] 66  Resp:  [18] 18  BP: (140)/(70) 140/70

## 2022-04-21 NOTE — ANESTHESIA POSTPROCEDURE EVALUATION
Patient: Suhail Gamino    Procedure Summary     Date: 04/21/22 Room / Location: [facility] OR 09 / [facility] OR    Anesthesia Start: 0705 Anesthesia Stop: 0829    Procedure: CHOLECYSTECTOMY LAPAROSCOPIC INTRAOPERATIVE CHOLANGIOGRAM (N/A Abdomen) Diagnosis:       Cholecystitis      (Cholecystitis [K81.9])    Surgeons: Marcus Hollis MD Provider: Esau Nicholas MD    Anesthesia Type: general ASA Status: 3          Anesthesia Type: general    Vitals  No vitals data found for the desired time range.          Post Anesthesia Care and Evaluation    Patient location during evaluation: PACU  Patient participation: complete - patient participated  Level of consciousness: sleepy but conscious  Pain management: adequate  Airway patency: patent  Anesthetic complications: No anesthetic complications    Cardiovascular status: acceptable  Respiratory status: acceptable  Hydration status: acceptable    Comments: -------------------------              04/21/22                   0830        -------------------------   BP:         140/70        Pulse:       58         Resp:         18          Temp:   96.8 °F (36 °C)   SpO2:         99%        -------------------------

## 2022-04-21 NOTE — INTERVAL H&P NOTE
H&P reviewed. The patient was examined and there are no changes to the H&P.  Patient and his wife aware of his cardiac history and was told that he had mild thickening of the wall muscle in his heart.  Spoke with anesthesia and feel that he is a stable to proceed with surgery today.  Patient clearly aware and wishes to proceed with surgery as well    Temp:  [96.8 °F (36 °C)] 96.8 °F (36 °C)  Heart Rate:  [66] 66  Resp:  [18] 18  BP: (140)/(70) 140/70

## 2022-04-21 NOTE — ANESTHESIA PROCEDURE NOTES
Airway  Urgency: elective    Date/Time: 4/21/2022 7:09 AM  Airway not difficult    General Information and Staff    Patient location during procedure: OR  CRNA: Kd Garrison CRNA    Indications and Patient Condition  Indications for airway management: airway protection    Preoxygenated: yes  MILS maintained throughout  Mask difficulty assessment: 0 - not attempted    Final Airway Details  Final airway type: endotracheal airway      Successful airway: ETT  Cuffed: yes   Successful intubation technique: video laryngoscopy  Endotracheal tube insertion site: oral  Blade: Canas  Blade size: 4  ETT size (mm): 7.5  Cormack-Lehane Classification: grade I - full view of glottis  Placement verified by: chest auscultation and capnometry   Cuff volume (mL): 7  Measured from: lips  ETT/EBT  to lips (cm): 23  Number of attempts at approach: 1  Assessment: lips, teeth, and gum same as pre-op and atraumatic intubation

## 2022-04-21 NOTE — OP NOTE
CHOLECYSTECTOMY LAPAROSCOPIC INTRAOPERATIVE CHOLANGIOGRAM  Procedure Note    Suhail Gamino  4/21/2022    Pre-op Diagnosis:   Cholecystitis [K81.9]    Post-op Diagnosis:     Post-Op Diagnosis Codes:     * Cholecystitis [K81.9]    Procedure/CPT® Codes:      Procedure(s):  CHOLECYSTECTOMY LAPAROSCOPIC INTRAOPERATIVE CHOLANGIOGRAM    Surgeon(s):  Marcus Holils MD    Anesthesia: General    Staff:   Circulator: Valery Zuniga RN  Scrub Person: Karin Mathis  Assistant: Geovanna Schumacher CSA    Assistant: Geovanna Schumacher CSA was responsible for performing the following activities: Retraction, Suction, Irrigation, Suturing, Closing and Placing Dressing and their skilled assistance was necessary for the success of this case.     Estimated Blood Loss: 30 mL    Specimens:                ID Type Source Tests Collected by Time   A (Not marked as sent) :  Tissue Gallbladder TISSUE PATHOLOGY EXAM Marcus Hollis MD 4/21/2022 0726         Drains: * No LDAs found *    Indications: 46-year-old gentleman recently diagnosed biliary dyskinesia presents now for laparoscopic cholecystectomy and interoperative cholangiogram    Findings: Normal-appearing gallbladder overall.  Normal intraoperative cholangiogram with good spontaneous flow into the duodenum no evidence of any common duct stones good flow of the hepatic radicles and adequate small diameter cystic duct    Complications: None    Procedure: Patient was brought to the operating room where he was placed under general endotracheal anesthesia without any difficulty. He had SCD'S in place. He received Unasyn IV antibiotics preoperatively. His abdomen was prepped and draped in normal sterile fashion. Timeout was taken; everyone was in agreement. Cutdown was performed a couple of centimeters above the umbilicus and a 10/11 Desirae trocar was placed without any difficulty.  Adequate pneumoperitoneum was obtained and TAP abdominal wall block was performed with  a total of 30 mL of 0.25% Marcaine; 20 mL injected on the right side of the abdomen and 10 mL injected on the left side using laparoscope for guidance.  5 mm trocar was placed in the epigastrium and two 5 mm trocars along the costal margin laterally. The patient was then positioned, the gallbladder was identified. There were some adhesions and these were taken down easily, some of these could have been noninflammatory. We exposed the gallbladder completely. We then started taking the gallbladder down in a dome-down type technique. We took it down approximately three-quarters of the way in the bed of the liver and then pulled the gallbladder up over the liver. We opened the peritoneum anterior and posterior aspects of Calot's triangle. We dissected the neck of the gallbladder down to the junction of the cystic duct. I was able to get around the neck of the gallbladder and was able to get a good critical view of safety. The cystic artery was identified and clip was placed proximally on it. Lemus clamp was placed across the neck of the gallbladder and catheter was placed in position.  Cholangiogram was performed with findings as described. The catheter was then removed. Two clips were placed proximally and 1 clip distally to where the cholangiogram had been performed. The cystic duct was then divided and a #1 PDS Endoloop was used to lasso the cystic duct, leaving 2 clips on the cystic duct along with the Endoloop. The cystic artery was divided with Harmonic scalpel. We then completed our dissection with Harmonic scalpel. I was concerned there might have been another posterior branch of the cystic artery, so we placed #1 Endoloop around the structure that had been divided with Harmonic scalpel. We suspect this could be the posterior branch. At completion good hemostasis was noted. No evidence of any significant bile leak. The gallbladder was then placed into an Endo Catch bag and then removed. We copiously irrigated  the right upper quadrant, good hemostasis was noted. The gallbladder was removed. Ports were removed. Fascia was closed at the cutdown site with 2-0 Vicryl figure-of-eight stitch. The skin was closed at all sites with 4-0 Monocryl subcuticular stitch. Glue was used for final skin closure. Patient was awakened, extubated and transferred to the recovery room awake, in stable condition.        Disposition: [facility] to recovery in stable condition        Marcus Hollis MD     Date: 4/21/2022  Time: 08:36 CDT

## 2022-04-21 NOTE — ANESTHESIA PREPROCEDURE EVALUATION
Anesthesia Evaluation     no history of anesthetic complications:  NPO Solid Status: > 8 hours  NPO Liquid Status: > 2 hours           Airway   Mallampati: I  TM distance: >3 FB  Neck ROM: full  No difficulty expected  Dental    (+) poor dentition        Pulmonary - normal exam    breath sounds clear to auscultation  (+) shortness of breath,   (-) COPD, asthma, sleep apnea, not a smoker    ROS comment: Snores   Seasonal allergies  Cardiovascular   Exercise tolerance: good (4-7 METS)    ECG reviewed  Patient on routine beta blocker and Beta blocker given within 24 hours of surgery  Rhythm: regular  Rate: normal    (+) hypertension well controlled less than 2 medications, valvular problems/murmurs murmur and AS, murmur,   (-) past MI, angina, cardiac stents, DVT, hyperlipidemia    ROS comment: Normal sinus rhythm  Possible Left atrial enlargement  Left ventricular hypertrophy  T wave abnormality, consider anterolateral ischemia  Abnormal ECG  When compared with ECG of 20-JUL-2021 09:10,  T wave inversion no longer evident in Inferior leads    · Left ventricular wall thickness is consistent with moderate to severe septal asymmetric hypertrophy.  · Left ventricular ejection fraction appears to be 66 - 70%.  · Left ventricular diastolic function was normal.  · Left ventricular mass index is increased.  · The findings are consistent with hypertrophic cardiomyopathy.  · Mild aortic valve stenosis is present.  · Estimated right ventricular systolic pressure from tricuspid regurgitation is normal (<35 mmHg).        Neuro/Psych  (+) headaches (migraines weekly), psychiatric history Anxiety and Depression,    (-) seizures, TIA, CVA, weakness, numbness  GI/Hepatic/Renal/Endo    (+) morbid obesity, GERD well controlled,    (-) hepatitis, liver disease, no renal disease, diabetes, no thyroid disorder    Musculoskeletal     (+) arthralgias, back pain,   Abdominal   (+) obese,    Substance History   (+) alcohol use (rarely),   (-)  drug use     OB/GYN          Other   arthritis,      (-) history of cancer  ROS/Med Hx Other: Cholecystitis    Hypertrophic cardiomyopathy  Has seen Heatherly, now Ernesto and soon Akram                  Anesthesia Plan    ASA 3     general   (Mild AS, hypertrophic cardiomyopathy  Will give BetaBlocker in OR since did not take metoprolol last night)  intravenous induction     Anesthetic plan, all risks, benefits, and alternatives have been provided, discussed and informed consent has been obtained with: patient and spouse/significant other.        CODE STATUS:

## 2022-04-26 LAB
LAB AP CASE REPORT: NORMAL
PATH REPORT.FINAL DX SPEC: NORMAL

## 2022-04-27 ENCOUNTER — OFFICE VISIT (OUTPATIENT)
Dept: SURGERY | Facility: CLINIC | Age: 47
End: 2022-04-27

## 2022-04-27 VITALS
TEMPERATURE: 97 F | WEIGHT: 315 LBS | HEART RATE: 68 BPM | SYSTOLIC BLOOD PRESSURE: 118 MMHG | HEIGHT: 76 IN | BODY MASS INDEX: 38.36 KG/M2 | DIASTOLIC BLOOD PRESSURE: 76 MMHG

## 2022-04-27 DIAGNOSIS — K81.9 CHOLECYSTITIS: Primary | ICD-10-CM

## 2022-04-27 PROCEDURE — 99024 POSTOP FOLLOW-UP VISIT: CPT | Performed by: SURGERY

## 2022-04-27 NOTE — PROGRESS NOTES
46-year-old gentleman who is now 1 week out for laparoscopic ostectomy and normal intraoperative cholangiogram for chronic cholecystitis and cholesterolosis.  Patient is done well with the surgery tolerating her diet has normal bowel function.  He is getting up moving around more more.  Incisions are all intact.  His abdomen is soft he is not icteric.  He is doing well from his surgery.  I went over pathology with him and his wife and answered all her questions.  He can return to work on 8 May without restriction.  Paperwork was provided he will follow-up with us on apparent basis

## 2022-05-09 DIAGNOSIS — F41.0 PANIC ATTACKS: ICD-10-CM

## 2022-05-09 DIAGNOSIS — F41.1 GENERALIZED ANXIETY DISORDER: Chronic | ICD-10-CM

## 2022-05-10 RX ORDER — CLONAZEPAM 0.5 MG/1
TABLET ORAL
Qty: 60 TABLET | Refills: 1 | Status: SHIPPED | OUTPATIENT
Start: 2022-05-10 | End: 2022-06-24

## 2022-05-12 DIAGNOSIS — J30.1 CHRONIC SEASONAL ALLERGIC RHINITIS DUE TO POLLEN: Chronic | ICD-10-CM

## 2022-05-12 RX ORDER — FEXOFENADINE HCL 180 MG/1
180 TABLET ORAL DAILY
Qty: 30 TABLET | Refills: 5 | Status: SHIPPED | OUTPATIENT
Start: 2022-05-12 | End: 2022-12-28 | Stop reason: SDUPTHER

## 2022-06-24 DIAGNOSIS — F41.1 GENERALIZED ANXIETY DISORDER: Chronic | ICD-10-CM

## 2022-06-24 DIAGNOSIS — M16.51 POST-TRAUMATIC OSTEOARTHRITIS OF RIGHT HIP: Chronic | ICD-10-CM

## 2022-06-24 DIAGNOSIS — M1A.0710 IDIOPATHIC CHRONIC GOUT OF RIGHT FOOT WITHOUT TOPHUS: Chronic | ICD-10-CM

## 2022-06-24 DIAGNOSIS — F41.0 PANIC ATTACKS: ICD-10-CM

## 2022-06-24 RX ORDER — CLONAZEPAM 0.5 MG/1
TABLET ORAL
Qty: 60 TABLET | Refills: 1 | Status: SHIPPED | OUTPATIENT
Start: 2022-06-24 | End: 2022-09-13

## 2022-06-24 RX ORDER — CYCLOBENZAPRINE HCL 10 MG
TABLET ORAL
Qty: 60 TABLET | Refills: 5 | Status: SHIPPED | OUTPATIENT
Start: 2022-06-24 | End: 2023-02-07 | Stop reason: SDUPTHER

## 2022-06-24 RX ORDER — ALLOPURINOL 100 MG/1
100 TABLET ORAL DAILY
Qty: 30 TABLET | Refills: 5 | Status: SHIPPED | OUTPATIENT
Start: 2022-06-24 | End: 2023-02-07 | Stop reason: SDUPTHER

## 2022-07-08 ENCOUNTER — OFFICE VISIT (OUTPATIENT)
Dept: CARDIOLOGY | Facility: CLINIC | Age: 47
End: 2022-07-08

## 2022-07-08 VITALS
HEIGHT: 76 IN | DIASTOLIC BLOOD PRESSURE: 88 MMHG | OXYGEN SATURATION: 98 % | HEART RATE: 89 BPM | BODY MASS INDEX: 38.36 KG/M2 | SYSTOLIC BLOOD PRESSURE: 138 MMHG | WEIGHT: 315 LBS

## 2022-07-08 DIAGNOSIS — I47.1 PAROXYSMAL SVT (SUPRAVENTRICULAR TACHYCARDIA): Primary | Chronic | ICD-10-CM

## 2022-07-08 DIAGNOSIS — I42.2 HYPERTROPHIC CARDIOMYOPATHY: Chronic | ICD-10-CM

## 2022-07-08 DIAGNOSIS — I35.1 NONRHEUMATIC AORTIC VALVE INSUFFICIENCY: ICD-10-CM

## 2022-07-08 PROCEDURE — 99214 OFFICE O/P EST MOD 30 MIN: CPT | Performed by: INTERNAL MEDICINE

## 2022-07-08 NOTE — PROGRESS NOTES
Suhail Gamino  47 y.o. male    07/08/2022  1. Paroxysmal SVT (supraventricular tachycardia) (HCC)    2. Nonrheumatic aortic valve insufficiency    3. Hypertrophic cardiomyopathy (HCC)    4       possible bicuspid aortic valve  5.      Ectasia of tubular ascending aorta 4.1  6.     Normal hemodynamic response to his dobutamine stress echo        History of Present Illness:    Body mass index is 42.36 kg/m². BMI is above normal parameters. Recommendations include: exercise counseling, nutrition counseling and referral to primary care.    47 years old patient was under care of Dr. Greene subsequent evaluated by Dr. Lovell and now he want establish care for [id number] referral with Dr. Lovell given the history of supraventricular tachycardia longstanding doing better on metoprolol having brief episodes lasting less than 30 seconds and spontaneously converted to sinus rhythm with a documented history of hypertrophic cardiomyopathy, cardiac MRI with asymmetric septal hypertrophy, normal hemodynamic response to dobutamine stress echo, mildly dilated ascending aorta 4.1 possibility of bicuspid aortic valve cannot be excluded presented today, 7/8/2022.  Patient denies symptoms of lightheaded dizziness syncope or any sustained palpitations.  He is a pleased with clinical outcome he is a physically active had history of CTA without evidence of obstructive coronary artery disease or myocardial bridging has EKG evidence of left ventricular hypertrophy, his mother had history of hypertrophic obstructive cardiomyopathy and not sure but his other family members        Echo 7/14/2021  · Left ventricular wall thickness is consistent with moderate to severe septal asymmetric hypertrophy.  · Left ventricular ejection fraction appears to be 66 - 70%.  · Left ventricular diastolic function was normal.  · Left ventricular mass index is increased.  · The findings are consistent with hypertrophic cardiomyopathy.  · Mild  aortic valve stenosis is present.  · Estimated right ventricular systolic pressure from tricuspid regurgitation is normal (<35 mmHg).        Monitor February 2021      · Predominant rhythm sinus mechanism with a normal average heart rate of 85 bpm. Heart rate varied from 56 to a maximum 143 bpm.  · The patient submitted 71 symptoms which overwhelmingly correlated with sinus mechanism only. Some symptoms correlated with PACs and PVCs. 1 symptom correlated with a 4 beat run of SVT.  · Normal burden of PACs. Normal burden PVCs. Normal conduction.  · 1 episode of SVT that lasted 10.1 seconds with a average rate of 145 bpm. The patient was asymptomatic.    Dobutamine stress echo 6/16 2020      · Resting left ventricle ejection fraction is normal at 61-65%. There is severe septal asymmetric hypertrophy consistent with known hypertrophic cardiomyopathy.  · Resting LVOT gradient is 8 mmHg.  · Low-dose dobutamine demonstrated the LVOT gradient of 8 mmHg.  · 20 mcg of dobutamine led to an LVOT gradient of 18 mmHg.  · 30 mcg of dobutamine led to an LVOT gradient of 18 mmHg. Patient was taken to 78% of maximal, age-predicted heart rate.  · Conclusion: Hypertrophic obstructive cardiomyopathy with stress-induced 18 mmHg LVOT gradient in the absence of beta-blockade.        SUBJECTIVE:    Allergies   Allergen Reactions   • Colcrys [Colchicine] Hives   • Diclofenac Other (See Comments)     Skin irritation, hot spots and sweating   • Mobic [Meloxicam] Palpitations         Past Medical History:   Diagnosis Date   • Abnormal EKG    • Achilles tendinitis, left leg    • Acute bronchitis    • Atrial fibrillation (HCC) 2002   • Callus    • Chest pain, atypical 9/25/2019   • Dyspnea on exertion 4/29/2020   • Fever    • Generalized anxiety disorder    • Gout    • Health examination of defined subpopulation    • Heart murmur 2002   • Heart valve disease 2019   • Hip pain    • Hypertension    • Ingrown toenail    • Lumbar back pain     Pain  radiating to lumbar region of back   • Obesity    • On long term drug therapy    • Osteoarthritis of hip    • Other long term (current) drug therapy          Past Surgical History:   Procedure Laterality Date   • CHOLECYSTECTOMY WITH INTRAOPERATIVE CHOLANGIOGRAM N/A 2022    Procedure: CHOLECYSTECTOMY LAPAROSCOPIC INTRAOPERATIVE CHOLANGIOGRAM;  Surgeon: Marcus Hollis MD;  Location: [facility];  Service: General;  Laterality: N/A;   • HIP SURGERY Right    • VASECTOMY     • WISDOM TOOTH EXTRACTION           Family History   Problem Relation Age of Onset   • Hyperlipidemia Father    • Hypertension Father    • Cancer Paternal Grandfather         Colorectal Cancer   • Heart disease Mother    • No Known Problems Sister    • Heart disease Maternal Grandmother    • Diabetes Paternal Grandmother    • No Known Problems Sister    • No Known Problems Sister    • No Known Problems Son    • No Known Problems Son    • No Known Problems Daughter    • No Known Problems Daughter    • Cancer Maternal Aunt          Social History     Socioeconomic History   • Marital status:      Spouse name: Tomas   • Number of children: 2   Tobacco Use   • Smoking status: Former Smoker     Packs/day: 1.50     Years: 4.00     Pack years: 6.00     Types: Cigarettes     Start date: 1996     Quit date:      Years since quittin.   • Smokeless tobacco: Current User     Types: Snuff   Vaping Use   • Vaping Use: Never used   Substance and Sexual Activity   • Alcohol use: No   • Drug use: No   • Sexual activity: Yes     Partners: Female     Birth control/protection: Surgical         Current Outpatient Medications   Medication Sig Dispense Refill   • allopurinol (ZYLOPRIM) 100 MG tablet TAKE 1 TABLET BY MOUTH DAILY 30 tablet 5   • clonazePAM (KlonoPIN) 0.5 MG tablet TAKE 1 TABLET BY MOUTH TWO TIMES A DAY AS NEEDED FOR ANXIETY (MAY CAUSE DROWSINESS) 60 tablet 1   • cyclobenzaprine (FLEXERIL) 10 MG tablet TAKE 1 TABLET BY MOUTH  "THREE TIMES A DAY AS NEEDED FOR MUSCLE SPASMS 60 tablet 5   • fexofenadine (ALLEGRA) 180 MG tablet Take 1 tablet by mouth Daily. 30 tablet 5   • FLUoxetine (PROzac) 20 MG capsule TAKE 1 CAPSULE BY MOUTH DAILY 30 capsule 5   • HYDROcodone-acetaminophen (NORCO) 7.5-325 MG per tablet Take 1 tablet by mouth Every 6 (Six) Hours As Needed for Moderate Pain. 12 tablet 0   • metoprolol succinate XL (TOPROL-XL) 50 MG 24 hr tablet TAKE 1 TABLET BY MOUTH EVERY EVENING (Patient taking differently: Take 50 mg by mouth Every Evening.) 90 tablet 1   • nabumetone (Relafen) 750 MG tablet Take 1 tablet by mouth 2 (Two) Times a Day. 60 tablet 1     No current facility-administered medications for this visit.           Review of Systems:     Constitutional:  Denies recent weight loss, weight gain,no change in exercise tolerance.     HENT:  Denies any hearing loss, epistaxis    Eyes: No blurring    Respiratory: No COPD    Cardiovascular: See H&P    Gastrointestinal:  Denies change in bowel habits and dyspepsia    Endocrine: Negative for cold intolerance, heat intolerance, polydipsia    Genitourinary: Negative.      Musculoskeletal: No history of osteoarthritis    Skin:  Deniesrashes, or skin lesions.     Allergic/Immunologic: Negative.  Negative for environmental allergies    Neurological:  Denies any history of recurrent headaches, strokes,     Hematological: Denies any food allergies, seasonal allergies    Psychiatric/Behavioral: Denies any history of depression        OBJECTIVE:    /88 (BP Location: Left arm, Patient Position: Sitting, Cuff Size: Adult)   Pulse 89   Ht 193 cm (76\")   Wt (!) 158 kg (348 lb)   SpO2 98%   BMI 42.36 kg/m²     Physical Exam:     Constitutional: Cooperative, alert and oriented, well-developed, well-nourished, in no acute distress.     HENT:   Head: Normocephalic, conjunctive is a pink, thyroid is nonpalpable no carotid bruit and trachea central.     Cardiovascular: Regular rhythm, S1 and S2 " normal, no S3 or S4. Apical impulse not displaced.  Positive systolic murmur at the base of heart  Pulmonary/Chest: Chest: No chest wall tenderness no rales and wheezing    Abdominal: Abdomen soft, bowel sounds normoactive, no masses,    Musculoskeletal: No deformities, clubbing, cyanosis, erythema. positive mild edema  Neurological: No gross motor or sensory deficits noted    Skin: Warm and dry to the touch, no apparent skin lesions .     Psychiatric: He has a normal mood and affect. His behavior is normal        Procedures      Lab Results   Component Value Date    WBC 9.66 12/29/2021    HGB 14.9 12/29/2021    HCT 44.9 12/29/2021    MCV 90.3 12/29/2021     12/29/2021     Lab Results   Component Value Date    GLUCOSE 109 (H) 12/29/2021    BUN 15 05/27/2022    CREATININE 0.9 05/27/2022    EGFRIFNONA 88 12/29/2021    BCR 18.3 12/29/2021    CO2 33.0 (H) 12/29/2021    CALCIUM 8.7 05/27/2022    ALBUMIN 3.3 (L) 05/27/2022    AST 14 (L) 05/27/2022    ALT 16 05/27/2022     Lab Results   Component Value Date    CHOL 170 04/12/2021    CHOL 179 08/28/2020    CHOL 163 04/29/2019     Lab Results   Component Value Date    TRIG 106 04/12/2021    TRIG 100 08/28/2020    TRIG 47 04/29/2019     Lab Results   Component Value Date    HDL 41 04/12/2021    HDL 50 08/28/2020    HDL 52 04/29/2019     No components found for: LDLCALC  Lab Results   Component Value Date     (H) 12/29/2021     (H) 04/12/2021     (H) 08/28/2020     No results found for: HDLLDLRATIO  No components found for: CHOLHDL  No results found for: HGBA1C  Lab Results   Component Value Date    TSH 1.120 12/29/2021           ASSESSMENT AND PLAN:       Assessment/Plan          1. Hypertrophic cardiomyopathy (CMS/HCC).  LVH is: Severe with IVS-3.9cm.    Previous TTE showed stable asymmetric septal hypertrophy.  There are no high-risk family history.  MRI shows slight LGE isolated to the septum.      He had longstanding history of palpitation  nonsustained tingling lasting less than 30 seconds with documented history of supraventricular tachycardia tolerating metoprolol very well.  Patient was referred for further evaluations management.  Multiple different options including EP study and ablation depending on outcome discussed with the patient and the family.  Pros and cons procedure risks explained.  He opted to continue present medications.      -Recommended 1st degree relative surveillance  -No indication for ICD.  -Yearly EKG recommended, or if clinical status changes  -Call for concerning symptoms  -Will plan on repeat 2D TTE in 2023  -Worsening symptoms were discussed.  I have asked him to contact me if any of these develop.  -He does not currently meet indications for septal reduction therapy because he is asymptomatic.  -Low intensity aerobic exercise is reasonable as a part of a healthy lifestyle     2. Bicuspid aortic valve determined by imaging with nonrheumatic aortic insufficiency. ACC stage B.  There are no surgical indications at this time.  · The patient has been advised to remain in clinical surveillance every 12 months.  · Signs and symptoms of worsening valve disease discussed.  I've asked the patient contact me for an earlier appointment if these develop.  · I've recommended a repeat 2D TTE every 1 year.  · TTE due: 2021.  · No indication based on 2017 ACC/AHA guidelines for IE prophylaxis for dental procedures: Optimal oral health is recommended through regular professional dental care and the use of appropriate dental products, such as manual, powered, and ultrasonic toothbrushes; dental floss; and other plaque-removal devices     3. Aortic ectasia, thoracic (CMS/HCC).  Patient's most recent TTE, 2020,  showed a tubular ascending aorta diameter of 4.0 cm. Last documented BSA: Body surface area is 2.8 meters squared.. This provides an ASI of 1. centimeters/meter squared.  Based on the best data that we have and ASI of less than 2.0  cm/meter squared has a 5-year rupture-free survival of 97.6%.  The next marked step-up in yearly event rate occurs at above 2.75.  Additionally the  patient does not have a family history of aneurysmal disease.  Patient's aortic valve is not trileaflet.  The patient does have systemic diseases diagnosed associated with aortic aneurysms: BAV.  I again explained to the patient that they currently do not meet criteria for a diagnosis of an aneurysm.  I also explained the normal aorta grows about 0.1 cm per 10 years after the age of 25.  We also discussed issues that can lead to more rapid widening of the aorta, such as uncontrolled hypertension.  I again explained that, in contrast to aneurysms, we have little clinical trial data to guide on optimal timing for active surveillance.    -I went over the signs and symptoms of aneurysmal dilatation.  -I've recommended clinical evaluation every 1 year.  -Next TTE due: 2023  -Importance of blood pressure control less than 130/80 emphasized  -Avoid smoking  -Follow-up Dr. Peace     4. Cardiac Risk Assessment based on 2019 ACCF guidelines: Blood pressure assessment, dyslipidemia goals, diet, bodyweight and exercise:  A team-based approach is recommended for the control of risk factors associated with ASCAD.  As such, Suhail Gamino was requested to have ongoing follow-up with their PCP. BP is an important modifiable risk factor. I have asked the patient to see their PCP for BP monitoring and management, as needed. A diet emphasizing intake of vegetables, fruits, nuts, whole grains and fish is recommended. Physical activity recommendations have been provided. The patient's BMI is recommended to be calculated at least annually.  The patient's BMI is Body mass index is 42.36 kg/m²..  Tobacco status is assessed at every visit based on established guidelines.  The patient's nicotine status: Social History        I spent 40 minutes caring for Suhail on this date of  service. This time includes time spent by me of counseling/coordination of care as relates to the presenting problem and any ordered procedures/tests as outlined above.           This document has been electronically signed by Erich Vera MD on July 8, 2022 10:00 CDT      Diagnoses and all orders for this visit:    1. Paroxysmal SVT (supraventricular tachycardia) (HCC) (Primary)    2. Nonrheumatic aortic valve insufficiency    3. Hypertrophic cardiomyopathy (HCC)          Erich Vera MD  7/8/2022  09:30 CDT

## 2022-07-27 ENCOUNTER — LAB (OUTPATIENT)
Dept: LAB | Facility: OTHER | Age: 47
End: 2022-07-27

## 2022-07-27 DIAGNOSIS — R73.02 IMPAIRED GLUCOSE TOLERANCE: Chronic | ICD-10-CM

## 2022-07-27 DIAGNOSIS — E78.2 MIXED HYPERLIPIDEMIA: ICD-10-CM

## 2022-07-27 LAB
ALBUMIN SERPL-MCNC: 3.9 G/DL (ref 3.5–5)
ALBUMIN/GLOB SERPL: 1.2 G/DL (ref 1.1–1.8)
ALP SERPL-CCNC: 90 U/L (ref 38–126)
ALT SERPL W P-5'-P-CCNC: 20 U/L
ANION GAP SERPL CALCULATED.3IONS-SCNC: 9 MMOL/L (ref 5–15)
AST SERPL-CCNC: 25 U/L (ref 17–59)
BASOPHILS # BLD AUTO: 0.07 10*3/MM3 (ref 0–0.2)
BASOPHILS NFR BLD AUTO: 0.7 % (ref 0–1.5)
BILIRUB SERPL-MCNC: 0.4 MG/DL (ref 0.2–1.3)
BUN SERPL-MCNC: 12 MG/DL (ref 7–23)
BUN/CREAT SERPL: 15 (ref 7–25)
CALCIUM SPEC-SCNC: 9.4 MG/DL (ref 8.4–10.2)
CHLORIDE SERPL-SCNC: 106 MMOL/L (ref 101–112)
CHOLEST SERPL-MCNC: 194 MG/DL (ref 150–200)
CO2 SERPL-SCNC: 28 MMOL/L (ref 22–30)
CREAT SERPL-MCNC: 0.8 MG/DL (ref 0.7–1.3)
DEPRECATED RDW RBC AUTO: 44.1 FL (ref 37–54)
EGFRCR SERPLBLD CKD-EPI 2021: 111.2 ML/MIN/1.73
EOSINOPHIL # BLD AUTO: 0.15 10*3/MM3 (ref 0–0.4)
EOSINOPHIL NFR BLD AUTO: 1.4 % (ref 0.3–6.2)
ERYTHROCYTE [DISTWIDTH] IN BLOOD BY AUTOMATED COUNT: 13.8 % (ref 12.3–15.4)
GLOBULIN UR ELPH-MCNC: 3.2 GM/DL (ref 2.3–3.5)
GLUCOSE SERPL-MCNC: 109 MG/DL (ref 70–99)
HBA1C MFR BLD: 5.3 % (ref 4.8–5.6)
HCT VFR BLD AUTO: 49.3 % (ref 37.5–51)
HDLC SERPL-MCNC: 43 MG/DL (ref 40–59)
HGB BLD-MCNC: 16.2 G/DL (ref 13–17.7)
LDLC SERPL CALC-MCNC: 115 MG/DL
LDLC/HDLC SERPL: 2.57 {RATIO} (ref 0–3.55)
LYMPHOCYTES # BLD AUTO: 3.16 10*3/MM3 (ref 0.7–3.1)
LYMPHOCYTES NFR BLD AUTO: 29.8 % (ref 19.6–45.3)
MCH RBC QN AUTO: 29.3 PG (ref 26.6–33)
MCHC RBC AUTO-ENTMCNC: 32.9 G/DL (ref 31.5–35.7)
MCV RBC AUTO: 89.3 FL (ref 79–97)
MONOCYTES # BLD AUTO: 0.93 10*3/MM3 (ref 0.1–0.9)
MONOCYTES NFR BLD AUTO: 8.8 % (ref 5–12)
NEUTROPHILS NFR BLD AUTO: 59.3 % (ref 42.7–76)
NEUTROPHILS NFR BLD AUTO: 6.31 10*3/MM3 (ref 1.7–7)
PLATELET # BLD AUTO: 232 10*3/MM3 (ref 140–450)
PMV BLD AUTO: 9.8 FL (ref 6–12)
POTASSIUM SERPL-SCNC: 3.9 MMOL/L (ref 3.4–5)
PROT SERPL-MCNC: 7.1 G/DL (ref 6.3–8.6)
RBC # BLD AUTO: 5.52 10*6/MM3 (ref 4.14–5.8)
SODIUM SERPL-SCNC: 143 MMOL/L (ref 137–145)
T4 FREE SERPL-MCNC: 1.3 NG/DL (ref 0.93–1.7)
TRIGL SERPL-MCNC: 203 MG/DL
TSH SERPL DL<=0.05 MIU/L-ACNC: 2.55 UIU/ML (ref 0.27–4.2)
VLDLC SERPL-MCNC: 36 MG/DL (ref 5–40)
WBC NRBC COR # BLD: 10.62 10*3/MM3 (ref 3.4–10.8)

## 2022-07-27 PROCEDURE — 83036 HEMOGLOBIN GLYCOSYLATED A1C: CPT | Performed by: INTERNAL MEDICINE

## 2022-07-27 PROCEDURE — 36415 COLL VENOUS BLD VENIPUNCTURE: CPT | Performed by: INTERNAL MEDICINE

## 2022-07-27 PROCEDURE — 80061 LIPID PANEL: CPT | Performed by: INTERNAL MEDICINE

## 2022-07-27 PROCEDURE — 80050 GENERAL HEALTH PANEL: CPT | Performed by: INTERNAL MEDICINE

## 2022-07-27 PROCEDURE — 84439 ASSAY OF FREE THYROXINE: CPT | Performed by: INTERNAL MEDICINE

## 2022-08-01 ENCOUNTER — OFFICE VISIT (OUTPATIENT)
Dept: FAMILY MEDICINE CLINIC | Facility: CLINIC | Age: 47
End: 2022-08-01

## 2022-08-01 VITALS
HEART RATE: 84 BPM | HEIGHT: 76 IN | WEIGHT: 315 LBS | OXYGEN SATURATION: 98 % | BODY MASS INDEX: 38.36 KG/M2 | SYSTOLIC BLOOD PRESSURE: 120 MMHG | TEMPERATURE: 98.6 F | DIASTOLIC BLOOD PRESSURE: 70 MMHG

## 2022-08-01 DIAGNOSIS — M54.42 ACUTE LEFT-SIDED LOW BACK PAIN WITH LEFT-SIDED SCIATICA: ICD-10-CM

## 2022-08-01 DIAGNOSIS — E66.01 CLASS 3 SEVERE OBESITY DUE TO EXCESS CALORIES WITHOUT SERIOUS COMORBIDITY WITH BODY MASS INDEX (BMI) OF 40.0 TO 44.9 IN ADULT: Chronic | ICD-10-CM

## 2022-08-01 DIAGNOSIS — M15.9 PRIMARY OSTEOARTHRITIS INVOLVING MULTIPLE JOINTS: Chronic | ICD-10-CM

## 2022-08-01 DIAGNOSIS — R73.02 IMPAIRED GLUCOSE TOLERANCE: Primary | Chronic | ICD-10-CM

## 2022-08-01 DIAGNOSIS — E78.2 MIXED HYPERLIPIDEMIA: Chronic | ICD-10-CM

## 2022-08-01 DIAGNOSIS — F41.1 GENERALIZED ANXIETY DISORDER: Chronic | ICD-10-CM

## 2022-08-01 DIAGNOSIS — I42.2 HYPERTROPHIC CARDIOMYOPATHY: Chronic | ICD-10-CM

## 2022-08-01 DIAGNOSIS — I47.1 PAROXYSMAL SVT (SUPRAVENTRICULAR TACHYCARDIA): Chronic | ICD-10-CM

## 2022-08-01 DIAGNOSIS — I77.810 AORTIC ECTASIA, THORACIC: Chronic | ICD-10-CM

## 2022-08-01 DIAGNOSIS — M1A.0710 IDIOPATHIC CHRONIC GOUT OF RIGHT FOOT WITHOUT TOPHUS: Chronic | ICD-10-CM

## 2022-08-01 PROCEDURE — 99214 OFFICE O/P EST MOD 30 MIN: CPT | Performed by: INTERNAL MEDICINE

## 2022-08-01 RX ORDER — FLUOXETINE HYDROCHLORIDE 20 MG/1
20 CAPSULE ORAL DAILY
Qty: 30 CAPSULE | Refills: 5 | Status: SHIPPED | OUTPATIENT
Start: 2022-08-01 | End: 2023-02-07 | Stop reason: SDUPTHER

## 2022-08-01 NOTE — PROGRESS NOTES
Chief Complaint   Patient presents with   • Hyperlipidemia   • Impaired glucose tolerance   • Anxiety   • Primary osteoarthritis involving multiple joints   • Aortic ectasia   • Paroxysmal SVT (supraventricular tachycardia)   • Idiopathic chronic gout        Subjective   Suhail Nemesio Gamino is a 47 y.o. male who presents to the office for follow-up.  He is due for routine labs, but these have not yet been completed.  He has hypertrophic cardiomyopathy and ectasia of the thoracic aorta.  These have been evaluated by cardiology and remain stable.  He takes Toprol-XL 50 mg nightly for treatment of the cardiomyopathy.  He has hyperlipidemia and manages this with diet.  He has gout and takes allopurinol 100 mg daily for prophylaxis. He has anxiety and takes fluoxetine 20 mg daily.  He also takes Klonopin 0.5 mg twice daily as needed for flareups and anxiety.  His symptoms have been doing well with these medications.  He has osteoarthritis and takes an over-the-counter NSAID as needed.  He also takes Flexeril as needed for muscle spasm associated with the arthritic pain.      He was diagnosed with cholecystitis in April.  He had a laparoscopic cholecystectomy on 4/21/2022.  He has recovered well from the cholecystectomy.    He complains of pain in his low back which has been flaring up and bothering him for several weeks.  This is on the left side with pain radiating into the left lower extremity.  He also has numbness and tingling in the left lower extremity.  He denies any bowel or bladder symptoms.  He has seen a chiropractor but this did not provide any significant relief of his symptoms.  He has been taking Flexeril as needed to help with muscle spasm.  The pain has been getting increasingly worse over the past few weeks.  Today he rates his pain as 8 on a scale of 1-10.  He is requesting referral to a neurosurgeon.    History of Present Illness has been reviewed and validated on 08/01/2022 and updated with  "any changes.    The following portions of the patient's history were reviewed and updated as appropriate: allergies, current medications, past family history, past medical history, past social history, past surgical history and problem list.    Review of Systems   Constitutional: Negative for chills, fatigue and fever.   HENT: Negative for congestion, sneezing, sore throat and trouble swallowing.    Eyes: Negative for visual disturbance.   Respiratory: Negative for cough, chest tightness, shortness of breath and wheezing.    Cardiovascular: Negative for chest pain, palpitations and leg swelling.   Gastrointestinal: Negative for constipation and vomiting.   Genitourinary: Negative for dysuria, frequency and urgency.   Musculoskeletal: Positive for back pain. Negative for neck pain.   Skin: Negative for rash.   Neurological: Negative for weakness and headaches.   Psychiatric/Behavioral:        Patient denies any feelings of depression and has not felt down, hopeless or lost interest in any activities.   All other systems reviewed and are negative.  Review of systems has been reviewed and validated on 08/01/2022 and updated with any changes.      Objective    Vitals:    08/01/22 1128   BP: 120/70   BP Location: Left arm   Patient Position: Sitting   Cuff Size: Large Adult   Pulse: 84  Comment: regular   Temp: 98.6 °F (37 °C)   TempSrc: Temporal   SpO2: 98%   Weight: (!) 157 kg (346 lb)   Height: 193 cm (76\")   PainSc:   8   PainLoc: Back  Comment: low back     Body mass index is 42.12 kg/m².    Physical Exam  Vitals and nursing note reviewed.   Constitutional:       General: He is not in acute distress.     Appearance: He is well-developed.   HENT:      Head: Normocephalic and atraumatic.      Nose: Nose normal.   Eyes:      General: No scleral icterus.     Conjunctiva/sclera: Conjunctivae normal.      Pupils: Pupils are equal, round, and reactive to light.   Cardiovascular:      Rate and Rhythm: Normal rate and " regular rhythm.      Heart sounds: Normal heart sounds. No murmur heard.    No friction rub. No gallop.   Pulmonary:      Effort: Pulmonary effort is normal. No respiratory distress.      Breath sounds: Normal breath sounds. No wheezing or rales.   Musculoskeletal:      Cervical back: Normal range of motion and neck supple.      Lumbar back: Spasms present. Decreased range of motion.      Comments: There is no vertebral point tenderness in the lumbar spine.  He does have some mild paravertebral muscle spasm on the left.   Lymphadenopathy:      Cervical: No cervical adenopathy.   Skin:     General: Skin is warm and dry.      Findings: No rash.   Neurological:      Mental Status: He is alert and oriented to person, place, and time.      Cranial Nerves: No cranial nerve deficit.   Psychiatric:         Behavior: Behavior normal.         Thought Content: Thought content normal.         Judgment: Judgment normal.     Physical exam has been reviewed and validated on 08/01/2022 and updated with any changes.    Assessment & Plan   Diagnoses and all orders for this visit:    1. Impaired glucose tolerance (Primary)    2. Mixed hyperlipidemia  -     CBC & Differential; Future  -     Comprehensive Metabolic Panel; Future  -     T4, Free; Future  -     TSH; Future  -     Urinalysis With Culture If Indicated -; Future  -     Lipid Panel; Future    3. Paroxysmal SVT (supraventricular tachycardia) ([facility])    4. Hypertrophic cardiomyopathy ([facility])    5. Aortic ectasia, thoracic ([facility])    6. Generalized anxiety disorder  -     FLUoxetine (PROzac) 20 MG capsule; Take 1 capsule by mouth Daily.  Dispense: 30 capsule; Refill: 5    7. Primary osteoarthritis involving multiple joints    8. Idiopathic chronic gout of right foot without tophus    9. Class 3 severe obesity due to excess calories without serious comorbidity with body mass index (BMI) of 40.0 to 44.9 in adult ([facility])    10. Acute left-sided low back pain with left-sided sciatica  -      XR Spine Lumbar Complete 4+VW; Future  -     MRI Lumbar Spine Without Contrast; Future      Labs are reviewed with patient.  His glucose is 109.  His hemoglobin A1c is 5.30.  He will monitor his dietary intake of sugar and carbohydrates.  His total cholesterol is 194,  and triglycerides 203.  His HDL is 43.  He will continue with dietary management of hyperlipidemia.  He will continue with allopurinol 100 mg daily for prophylactic treatment of his gout.  He will continue with fluoxetine for treatment of the anxiety.  He will also continue with Klonopin 0.5 mg twice daily as needed for increased anxiety.  He may use an over-the-counter NSAID as needed for any flareups in his osteoarthritis.  He will continue to use Flexeril as needed for muscle spasm.  He will continue with Toprol-XL 50 mg daily as directed by cardiology.    I will obtain an x-ray of the lumbar spine today.  I will also order an MRI of the lumbar spine for further evaluation of his symptoms.  I will follow-up with him once I have these results.  I explained to him that the neurosurgeon will not schedule an appointment until he has an MRI completed.    Patient understands the risks associated with this controlled medication, including tolerance and addiction.  Patient also agrees to only obtain this medication from me, and not from a another provider, unless that provider is covering for me in my absence.  Patient also agrees to be compliant in dosing, and not self adjust the dose of medication.  A signed controlled substance agreement is on file, and the patient has received a controlled substance education sheet at this or a previous visit.  The patient has also signed a consent for treatment with a controlled substance as per [facility] policy. ABDULLAHI is obtained.    Patient's Body mass index is 42.12 kg/m². indicating that he is morbidly obese (BMI > 40 or > 35 with obesity - related health condition). Obesity-related health conditions  include the following: dyslipidemias and osteoarthritis. Obesity is unchanged. BMI is is above average; BMI management plan is completed. We discussed portion control and increasing exercise..        PHQ-2/PHQ-9 Depression Screening 1/21/2022   Retired PHQ-9 Total Score 0   Retired Total Score 0

## 2022-08-23 ENCOUNTER — HOSPITAL ENCOUNTER (OUTPATIENT)
Dept: MRI IMAGING | Facility: HOSPITAL | Age: 47
Discharge: HOME | End: 2022-08-23
Admitting: INTERNAL MEDICINE

## 2022-08-23 DIAGNOSIS — M54.42 ACUTE LEFT-SIDED LOW BACK PAIN WITH LEFT-SIDED SCIATICA: ICD-10-CM

## 2022-08-23 PROCEDURE — 72148 MRI LUMBAR SPINE W/O DYE: CPT

## 2022-08-25 ENCOUNTER — TELEPHONE (OUTPATIENT)
Dept: FAMILY MEDICINE CLINIC | Facility: CLINIC | Age: 47
End: 2022-08-25

## 2022-08-25 DIAGNOSIS — M51.36 BULGING LUMBAR DISC: Primary | ICD-10-CM

## 2022-08-25 NOTE — TELEPHONE ENCOUNTER
I read Dr Sotomayor's message to the patient. The patient voiced understanding, had no additional questions, and thanked me for the call. Patient would like to see Arti's PT.

## 2022-08-25 NOTE — TELEPHONE ENCOUNTER
----- Message from Champ Sotomayor MD sent at 8/24/2022  7:26 AM CDT -----  Notify patient that the MRI of his lumbar spine shows some disc bulging, but no significant stenosis is noted.  I recommend physical therapy for evaluation and treatment of low back pain.

## 2022-09-06 DIAGNOSIS — U07.1 COVID-19: Primary | ICD-10-CM

## 2022-09-08 DIAGNOSIS — I42.2 HYPERTROPHIC CARDIOMYOPATHY: ICD-10-CM

## 2022-09-08 RX ORDER — METOPROLOL SUCCINATE 50 MG/1
TABLET, EXTENDED RELEASE ORAL
Qty: 90 TABLET | Refills: 1 | Status: SHIPPED | OUTPATIENT
Start: 2022-09-08 | End: 2023-03-13

## 2022-09-12 DIAGNOSIS — F41.1 GENERALIZED ANXIETY DISORDER: Chronic | ICD-10-CM

## 2022-09-12 DIAGNOSIS — F41.0 PANIC ATTACKS: ICD-10-CM

## 2022-09-13 DIAGNOSIS — E78.2 MIXED HYPERLIPIDEMIA: Primary | ICD-10-CM

## 2022-09-13 RX ORDER — CLONAZEPAM 0.5 MG/1
TABLET ORAL
Qty: 60 TABLET | Refills: 0 | Status: SHIPPED | OUTPATIENT
Start: 2022-09-13 | End: 2022-12-06

## 2022-09-20 ENCOUNTER — TELEPHONE (OUTPATIENT)
Dept: PODIATRY | Facility: CLINIC | Age: 47
End: 2022-09-20

## 2022-09-20 RX ORDER — NABUMETONE 750 MG/1
750 TABLET, FILM COATED ORAL 2 TIMES DAILY
Qty: 60 TABLET | Refills: 2 | Status: SHIPPED | OUTPATIENT
Start: 2022-09-20 | End: 2023-01-25

## 2022-09-20 NOTE — TELEPHONE ENCOUNTER
PATIENT WOULD LIKE A REFILL OF nabumetone (Relafen) 750 MG tablet CALLED INTO CLINIC PHARMACY [facility] IN [place].    PATIENT WAS LAST SEEN  4/13/22

## 2022-11-08 ENCOUNTER — LAB (OUTPATIENT)
Dept: LAB | Facility: OTHER | Age: 47
End: 2022-11-08

## 2022-11-08 ENCOUNTER — OFFICE VISIT (OUTPATIENT)
Dept: FAMILY MEDICINE CLINIC | Facility: CLINIC | Age: 47
End: 2022-11-08

## 2022-11-08 VITALS
HEART RATE: 86 BPM | WEIGHT: 315 LBS | SYSTOLIC BLOOD PRESSURE: 142 MMHG | HEIGHT: 75 IN | OXYGEN SATURATION: 99 % | BODY MASS INDEX: 39.17 KG/M2 | DIASTOLIC BLOOD PRESSURE: 84 MMHG

## 2022-11-08 DIAGNOSIS — Z79.899 HIGH RISK MEDICATION USE: Chronic | ICD-10-CM

## 2022-11-08 DIAGNOSIS — M54.42 CHRONIC BILATERAL LOW BACK PAIN WITH BILATERAL SCIATICA: Chronic | ICD-10-CM

## 2022-11-08 DIAGNOSIS — F41.1 GENERALIZED ANXIETY DISORDER: Primary | Chronic | ICD-10-CM

## 2022-11-08 DIAGNOSIS — E66.01 MORBID OBESITY WITH BMI OF 40.0-44.9, ADULT: Chronic | ICD-10-CM

## 2022-11-08 DIAGNOSIS — G89.29 CHRONIC BILATERAL LOW BACK PAIN WITH BILATERAL SCIATICA: Chronic | ICD-10-CM

## 2022-11-08 DIAGNOSIS — M54.41 CHRONIC BILATERAL LOW BACK PAIN WITH BILATERAL SCIATICA: Chronic | ICD-10-CM

## 2022-11-08 DIAGNOSIS — F41.0 PANIC ATTACKS: Chronic | ICD-10-CM

## 2022-11-08 PROCEDURE — 99214 OFFICE O/P EST MOD 30 MIN: CPT | Performed by: NURSE PRACTITIONER

## 2022-11-08 PROCEDURE — 80307 DRUG TEST PRSMV CHEM ANLYZR: CPT | Performed by: NURSE PRACTITIONER

## 2022-11-08 PROCEDURE — G0481 DRUG TEST DEF 8-14 CLASSES: HCPCS | Performed by: NURSE PRACTITIONER

## 2022-11-08 RX ORDER — CLONAZEPAM 0.5 MG/1
TABLET ORAL
Qty: 60 TABLET | Refills: 0 | Status: CANCELLED | OUTPATIENT
Start: 2022-11-08
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
## 2022-11-16 LAB — DRUGS UR: NORMAL

## 2022-11-22 DIAGNOSIS — J30.1 CHRONIC SEASONAL ALLERGIC RHINITIS DUE TO POLLEN: Chronic | ICD-10-CM

## 2022-11-22 RX ORDER — FEXOFENADINE HCL 180 MG/1
180 TABLET ORAL DAILY
Qty: 30 TABLET | Refills: 5 | OUTPATIENT
Start: 2022-11-22

## 2022-11-23 ENCOUNTER — TELEPHONE (OUTPATIENT)
Dept: CARDIOLOGY | Facility: CLINIC | Age: 47
End: 2022-11-23

## 2022-11-23 NOTE — TELEPHONE ENCOUNTER
Contacted patient to let him now his echo shows a good strong heart muscle, same as previous.  He was understanding.

## 2022-12-06 DIAGNOSIS — F41.1 GENERALIZED ANXIETY DISORDER: Chronic | ICD-10-CM

## 2022-12-06 DIAGNOSIS — F41.0 PANIC ATTACKS: ICD-10-CM

## 2022-12-06 RX ORDER — CLONAZEPAM 0.5 MG/1
TABLET ORAL
Qty: 60 TABLET | Refills: 1 | OUTPATIENT
Start: 2022-12-06

## 2022-12-06 RX ORDER — CLONAZEPAM 0.5 MG/1
TABLET ORAL
Qty: 60 TABLET | Refills: 0 | Status: SHIPPED | OUTPATIENT
Start: 2022-12-06 | End: 2023-02-07

## 2022-12-28 DIAGNOSIS — J30.1 CHRONIC SEASONAL ALLERGIC RHINITIS DUE TO POLLEN: Chronic | ICD-10-CM

## 2022-12-28 RX ORDER — FEXOFENADINE HCL 180 MG/1
180 TABLET ORAL DAILY
Qty: 30 TABLET | Refills: 5 | Status: SHIPPED | OUTPATIENT
Start: 2022-12-28

## 2022-12-28 RX ORDER — FEXOFENADINE HCL 180 MG/1
180 TABLET ORAL DAILY
Qty: 30 TABLET | Refills: 5 | OUTPATIENT
Start: 2022-12-28

## 2023-01-09 DIAGNOSIS — M1A.0710 IDIOPATHIC CHRONIC GOUT OF RIGHT FOOT WITHOUT TOPHUS: Chronic | ICD-10-CM

## 2023-01-09 DIAGNOSIS — M16.51 POST-TRAUMATIC OSTEOARTHRITIS OF RIGHT HIP: Chronic | ICD-10-CM

## 2023-01-11 RX ORDER — CYCLOBENZAPRINE HCL 10 MG
TABLET ORAL
Qty: 60 TABLET | Refills: 5 | OUTPATIENT
Start: 2023-01-11

## 2023-01-11 RX ORDER — ALLOPURINOL 100 MG/1
100 TABLET ORAL DAILY
Qty: 30 TABLET | Refills: 5 | OUTPATIENT
Start: 2023-01-11

## 2023-01-17 ENCOUNTER — OFFICE VISIT (OUTPATIENT)
Dept: CARDIOLOGY | Facility: CLINIC | Age: 48
End: 2023-01-17
Payer: COMMERCIAL

## 2023-01-17 VITALS
DIASTOLIC BLOOD PRESSURE: 90 MMHG | BODY MASS INDEX: 39.17 KG/M2 | HEIGHT: 75 IN | OXYGEN SATURATION: 100 % | SYSTOLIC BLOOD PRESSURE: 152 MMHG | WEIGHT: 315 LBS | HEART RATE: 79 BPM

## 2023-01-17 DIAGNOSIS — Q23.1 BICUSPID AORTIC VALVE DETERMINED BY IMAGING: ICD-10-CM

## 2023-01-17 DIAGNOSIS — E78.2 MIXED HYPERLIPIDEMIA: Chronic | ICD-10-CM

## 2023-01-17 DIAGNOSIS — I42.2 HYPERTROPHIC CARDIOMYOPATHY: Chronic | ICD-10-CM

## 2023-01-17 DIAGNOSIS — I35.1 NONRHEUMATIC AORTIC VALVE INSUFFICIENCY: ICD-10-CM

## 2023-01-17 DIAGNOSIS — R07.2 PRECORDIAL PAIN: Primary | ICD-10-CM

## 2023-01-17 DIAGNOSIS — I47.1 PAROXYSMAL SVT (SUPRAVENTRICULAR TACHYCARDIA): Chronic | ICD-10-CM

## 2023-01-17 PROCEDURE — 99214 OFFICE O/P EST MOD 30 MIN: CPT | Performed by: INTERNAL MEDICINE

## 2023-01-17 PROCEDURE — 93000 ELECTROCARDIOGRAM COMPLETE: CPT | Performed by: INTERNAL MEDICINE

## 2023-01-17 RX ORDER — LOSARTAN POTASSIUM 25 MG/1
25 TABLET ORAL DAILY
Qty: 90 TABLET | Refills: 3 | Status: SHIPPED | OUTPATIENT
Start: 2023-01-17

## 2023-01-17 NOTE — PROGRESS NOTES
Suhail Gamino  47 y.o. male    1/17/2023  1. Precordial pain    2. Hypertrophic cardiomyopathy (HCC)    3. Paroxysmal SVT (supraventricular tachycardia) (HCC)    4. Mixed hyperlipidemia    5. Nonrheumatic aortic valve insufficiency    6. Bicuspid aortic valve determined by imaging          Ectasia of tubular ascending aorta 4.1          History of Present Illness:    Body mass index is 44.75 kg/m². BMI is above normal parameters. Recommendations include: exercise counseling, nutrition counseling and referral to primary care.    47 years old patient presented today dated January 17, 2023 for routine follow-up.  EKG sinus rhythm with evidence of left ventricular hypertrophy and repolarization abnormality.  The patient previous abnormal CT: Angiogram.  He complained of chest pain sharp in quality lasting less than a few seconds atypical from clinical description.  Given the clinical description of chest pain duration and previous normal CT: Angiogram no further risk stratification needed at the time of evaluations.  He had concurrent medical problem of hypertension, nonsustained supraventricular tachycardia with undefined mechanism, bicuspid aortic valve and mildly dilated [facility]  No dizziness no syncope no interval claudication reported.  He had EKG evidence of left ventricular hypertrophy and his mother had history of hypertrophic obstructive cardiomyopathy and not sure about his other family members.  Repeat echocardiogram in November 2022 left ventricular hypertrophy with relaxation abnormality with mild mitral regurgitation mildly dilated ascending aorta.  Had history of bicuspid aortic valve    Echo November 2022       Left ventricular ejection fraction appears to be 66 - 70%.  •  Left ventricular wall thickness is consistent with asymmetric hypertrophy.  •  Left ventricular diastolic function is consistent with (grade I) impaired relaxation.  •  Left  atrial volume is moderately increased.  •  Mild dilation of the aortic root is present. Mild dilation of the sinuses of Valsalva is present. Mild dilation of the proximal aorta is present.  Mild aortic valve regurgitation is present  Echo 7/14/2021  · Left ventricular wall thickness is consistent with moderate to severe septal asymmetric hypertrophy.  · Left ventricular ejection fraction appears to be 66 - 70%.  · Left ventricular diastolic function was normal.  · Left ventricular mass index is increased.  · The findings are consistent with hypertrophic cardiomyopathy.  · Mild aortic valve stenosis is present.  · Estimated right ventricular systolic pressure from tricuspid regurgitation is normal (<35 mmHg).        Monitor February 2021      · Predominant rhythm sinus mechanism with a normal average heart rate of 85 bpm. Heart rate varied from 56 to a maximum 143 bpm.  · The patient submitted 71 symptoms which overwhelmingly correlated with sinus mechanism only. Some symptoms correlated with PACs and PVCs. 1 symptom correlated with a 4 beat run of SVT.  · Normal burden of PACs. Normal burden PVCs. Normal conduction.  · 1 episode of SVT that lasted 10.1 seconds with a average rate of 145 bpm. The patient was asymptomatic.    Dobutamine stress echo 6/16 2020      · Resting left ventricle ejection fraction is normal at 61-65%. There is severe septal asymmetric hypertrophy consistent with known hypertrophic cardiomyopathy.  · Resting LVOT gradient is 8 mmHg.  · Low-dose dobutamine demonstrated the LVOT gradient of 8 mmHg.  · 20 mcg of dobutamine led to an LVOT gradient of 18 mmHg.  · 30 mcg of dobutamine led to an LVOT gradient of 18 mmHg. Patient was taken to 78% of maximal, age-predicted heart rate.  · Conclusion: Hypertrophic obstructive cardiomyopathy with stress-induced 18 mmHg LVOT gradient in the absence of beta-blockade.        SUBJECTIVE:    Allergies   Allergen Reactions   • Colcrys [Colchicine] Hives   •  Diclofenac Other (See Comments)     Skin irritation, hot spots and sweating   • Mobic [Meloxicam] Palpitations         Past Medical History:   Diagnosis Date   • Abnormal EKG    • Achilles tendinitis, left leg    • Acute bronchitis    • Atrial fibrillation (HCC)    • Callus    • Chest pain, atypical 2019   • Dyspnea on exertion 2020   • Fever    • Generalized anxiety disorder    • Gout    • Health examination of defined subpopulation    • Heart murmur    • Heart valve disease    • Hip pain    • Hypertension    • Ingrown toenail    • Lumbar back pain     Pain radiating to lumbar region of back   • Obesity    • On long term drug therapy    • Osteoarthritis of hip    • Other long term (current) drug therapy          Past Surgical History:   Procedure Laterality Date   • CHOLECYSTECTOMY WITH INTRAOPERATIVE CHOLANGIOGRAM N/A 2022    Procedure: CHOLECYSTECTOMY LAPAROSCOPIC INTRAOPERATIVE CHOLANGIOGRAM;  Surgeon: Marcus Hollis MD;  Location: [facility];  Service: General;  Laterality: N/A;   • HIP SURGERY Right    • VASECTOMY     • WISDOM TOOTH EXTRACTION           Family History   Problem Relation Age of Onset   • Hyperlipidemia Father    • Hypertension Father    • Cancer Paternal Grandfather         Colorectal Cancer   • Heart disease Mother    • No Known Problems Sister    • Heart disease Maternal Grandmother    • Diabetes Paternal Grandmother    • No Known Problems Sister    • No Known Problems Sister    • No Known Problems Son    • No Known Problems Son    • No Known Problems Daughter    • No Known Problems Daughter    • Cancer Maternal Aunt          Social History     Socioeconomic History   • Marital status:      Spouse name: Tomas   • Number of children: 2   Tobacco Use   • Smoking status: Former     Packs/day: 1.50     Years: 4.00     Pack years: 6.00     Types: Cigarettes     Start date: 1996     Quit date: 2000     Years since quittin.0   • Smokeless tobacco:  Current     Types: Snuff   Vaping Use   • Vaping Use: Never used   Substance and Sexual Activity   • Alcohol use: No   • Drug use: No   • Sexual activity: Yes     Partners: Female     Birth control/protection: Surgical         Current Outpatient Medications   Medication Sig Dispense Refill   • allopurinol (ZYLOPRIM) 100 MG tablet TAKE 1 TABLET BY MOUTH DAILY 30 tablet 5   • clonazePAM (KlonoPIN) 0.5 MG tablet TAKE 1 TABLET BY MOUTH TWO TIMES A DAY AS NEEDED FOR ANXIETY (MAY CAUSE DROWSINESS) 60 tablet 0   • cyclobenzaprine (FLEXERIL) 10 MG tablet TAKE 1 TABLET BY MOUTH THREE TIMES A DAY AS NEEDED FOR MUSCLE SPASMS 60 tablet 5   • fexofenadine (ALLEGRA) 180 MG tablet Take 1 tablet by mouth Daily. 30 tablet 5   • FLUoxetine (PROzac) 20 MG capsule Take 1 capsule by mouth Daily. 30 capsule 5   • metoprolol succinate XL (TOPROL-XL) 50 MG 24 hr tablet TAKE 1 TABLET BY MOUTH EVERY EVENING 90 tablet 1   • nabumetone (RELAFEN) 750 MG tablet Take 1 tablet by mouth 2 (Two) Times a Day. 60 tablet 2     No current facility-administered medications for this visit.           Review of Systems:   No significant change was noted in the review of system except chest discomfort described as sharp in quality lasting less than few seconds no further recurrence and have previous normal CT coronary angio  Constitutional:  Denies recent weight loss, weight gain,no change in exercise tolerance.     HENT:  Denies any hearing loss, epistaxis    Eyes: No blurring    Respiratory: No COPD    Cardiovascular: See H&P    Gastrointestinal:  Denies change in bowel habits and dyspepsia    Endocrine: Negative for cold intolerance, heat intolerance, polydipsia    Genitourinary: Negative.      Musculoskeletal: No history of osteoarthritis    Skin:  Deniesrashes, or skin lesions.     Allergic/Immunologic: Negative.  Negative for environmental allergies    Neurological:  Denies any history of recurrent headaches, strokes,     Hematological: Denies any food  "allergies, seasonal allergies    Psychiatric/Behavioral: Denies any history of depression        OBJECTIVE:    /90 (BP Location: Right arm, Patient Position: Sitting, Cuff Size: Adult)   Pulse 79   Ht 190.5 cm (75\")   Wt (!) 162 kg (358 lb)   SpO2 100%   BMI 44.75 kg/m²     Physical Exam:   No change was noted in physical  Constitutional: Cooperative, alert and oriented, well-developed, well-nourished, in no acute distress.     HENT:   Head: Normocephalic, conjunctive is a pink, thyroid is nonpalpable no carotid bruit and trachea central.     Cardiovascular: Regular rhythm, S1 and S2 normal, no S3 or S4. Apical impulse not displaced.  Positive systolic murmur at the base of heart  Pulmonary/Chest: Chest: No chest wall tenderness no rales and wheezing    Abdominal: Abdomen soft, bowel sounds normoactive, no masses,    Musculoskeletal: No deformities, clubbing, cyanosis, erythema. positive mild edema  Neurological: No gross motor or sensory deficits noted    Skin: Warm and dry to the touch, no apparent skin lesions .     Psychiatric: He has a normal mood and affect. His behavior is normal        Procedures      Lab Results   Component Value Date    WBC 10.62 07/27/2022    HGB 16.2 07/27/2022    HCT 49.3 07/27/2022    MCV 89.3 07/27/2022     07/27/2022     Lab Results   Component Value Date    GLUCOSE 109 (H) 07/27/2022    BUN 12 07/27/2022    CREATININE 0.80 07/27/2022    EGFRIFNONA 88 12/29/2021    BCR 15.0 07/27/2022    CO2 28.0 07/27/2022    CALCIUM 9.4 07/27/2022    ALBUMIN 3.90 07/27/2022    AST 25 07/27/2022    ALT 20 07/27/2022     Lab Results   Component Value Date    CHOL 194 07/27/2022    CHOL 170 04/12/2021    CHOL 179 08/28/2020     Lab Results   Component Value Date    TRIG 203 (H) 07/27/2022    TRIG 106 04/12/2021    TRIG 100 08/28/2020     Lab Results   Component Value Date    HDL 43 07/27/2022    HDL 41 04/12/2021    HDL 50 08/28/2020     No components found for: LDLCALC  Lab Results "   Component Value Date     (H) 07/27/2022     (H) 12/29/2021     (H) 04/12/2021     No results found for: HDLLDLRATIO  No components found for: CHOLHDL  Lab Results   Component Value Date    HGBA1C 5.30 07/27/2022     Lab Results   Component Value Date    TSH 2.550 07/27/2022           ASSESSMENT AND PLAN:       Assessment/Plan          1. Hypertrophic cardiomyopathy (CMS/HCC).  LVH is: Severe with IVS-3.9cm.    Previous TTE showed stable asymmetric septal hypertrophy.  There are no high-risk family history.  MRI shows slight LGE isolated to the septum.      He had longstanding history of palpitation nonsustained tingling lasting less than 30 seconds with documented history of supraventricular tachycardia tolerating metoprolol very well.  Patient was referred for further evaluations management.  Multiple different options including EP study and ablation depending on outcome discussed with the patient and the family.  Pros and cons procedure risks explained.  He opted to continue present medications.      -Worsening symptoms were discussed.  I have asked him to contact me if any of these develop.  -He does not currently meet indications for septal reduction therapy because he is asymptomatic.  -Low intensity aerobic exercise is reasonable as a part of a healthy lifestyle     2. Bicuspid aortic valve determined by imaging with nonrheumatic aortic insufficiency. ACC stage B.  There are no surgical indications at this time.  · The patient has been advised to remain in clinical surveillance every 12 months.  · Signs and symptoms of worsening valve disease discussed.  I've asked the patient contact me for an earlier appointment if these develop.  · I've recommended a repeat 2D TTE every 1 year.    · No indication based on 2017 ACC/AHA guidelines for IE prophylaxis for dental procedures: Optimal oral health is recommended through regular professional dental care and the use of appropriate dental  products, such as manual, powered, and ultrasonic toothbrushes; dental floss; and other plaque-removal devices     3. Aortic ectasia, thoracic (CMS/HCC).  Patient's most recent TTE, 2020,  showed a tubular ascending aorta diameter of 4.0 cm. Last documented BSA: Body surface area is 2.8 meters squared.. This provides an ASI of 1. centimeters/meter squared.  Based on the best data that we have and ASI of less than 2.0 cm/meter squared has a 5-year rupture-free survival of 97.6%.  The next marked step-up in yearly event rate occurs at above 2.75.  Additionally the  patient does not have a family history of aneurysmal disease.  Patient's aortic valve is not trileaflet.  The patient does have systemic diseases diagnosed associated with aortic aneurysms: BAV.  I again explained to the patient that they currently do not meet criteria for a diagnosis of an aneurysm.  I also explained the normal aorta grows about 0.1 cm per 10 years after the age of 25.  We also discussed issues that can lead to more rapid widening of the aorta, such as uncontrolled hypertension.  I again explained that, in contrast to aneurysms, we have little clinical trial data to guide on optimal timing for active surveillance.    -I went over the signs and symptoms of aneurysmal dilatation.  -I've recommended clinical evaluation every 1 year.  -Importance of blood pressure control less than 130/80 emphasized  -Avoid smoking  -Follow-up Dr. Peace     Precordial chest pain    Atypical , had normal CT coronary angiogram no further risk stratification      Hypertension  Currently on metoprolol and his systolic blood pressure is on the higher side.  Patient counseled educated regarding risk factor lifestyle modification eating habit particularly food with high sodium content and processed food.  DASH diet was discussed.  I will add losartan starting a dose of 25 mg patient with blood pressure monitoring he can check and adjust the dose of the systolic  blood pressure more than 130 can go up to 50 mg.    Preventive class II obesity with a BMI 44.75    Significant low-carb weight, low-fat, DASH diet discussed with the patient    I spent 22 minutes caring for Suhail on this date of service. This time includes time spent by me of counseling/coordination of care as relates to the presenting problem and any ordered procedures/tests as outlined above.           This document has been electronically signed by Erich Vera MD on January 17, 2023 09:39 CST      Diagnoses and all orders for this visit:    1. Precordial pain (Primary)  -     ECG 12 Lead    2. Hypertrophic cardiomyopathy (HCC)    3. Paroxysmal SVT (supraventricular tachycardia) (HCC)    4. Mixed hyperlipidemia    5. Nonrheumatic aortic valve insufficiency    6. Bicuspid aortic valve determined by imaging          Erich Vera MD  1/17/2023  09:39 CST

## 2023-01-18 LAB
QT INTERVAL: 378 MS
QTC INTERVAL: 433 MS

## 2023-01-25 ENCOUNTER — OFFICE VISIT (OUTPATIENT)
Dept: PODIATRY | Facility: CLINIC | Age: 48
End: 2023-01-25
Payer: COMMERCIAL

## 2023-01-25 VITALS — WEIGHT: 315 LBS | HEART RATE: 81 BPM | OXYGEN SATURATION: 98 % | BODY MASS INDEX: 39.17 KG/M2 | HEIGHT: 75 IN

## 2023-01-25 DIAGNOSIS — M72.2 PLANTAR FASCIITIS: ICD-10-CM

## 2023-01-25 DIAGNOSIS — M76.72 PERONEAL TENDINITIS OF LEFT LOWER LEG: Primary | ICD-10-CM

## 2023-01-25 DIAGNOSIS — M79.672 LEFT FOOT PAIN: ICD-10-CM

## 2023-01-25 PROCEDURE — 99214 OFFICE O/P EST MOD 30 MIN: CPT | Performed by: NURSE PRACTITIONER

## 2023-01-25 RX ORDER — METHYLPREDNISOLONE 4 MG/1
TABLET ORAL
Qty: 21 TABLET | Refills: 0 | Status: SHIPPED | OUTPATIENT
Start: 2023-01-25 | End: 2023-02-08

## 2023-01-25 RX ORDER — NABUMETONE 750 MG/1
750 TABLET, FILM COATED ORAL 2 TIMES DAILY
Qty: 60 TABLET | Refills: 3 | Status: SHIPPED | OUTPATIENT
Start: 2023-01-25

## 2023-01-25 NOTE — PROGRESS NOTES
Suhail Gamino  1975  47 y.o. male      01/25/2023    Chief Complaint   Patient presents with   • Left Foot - Pain       History of Present Illness    Suhail Gamino is a 47 y.o.male came to clinic with concern of left foot pain on lateral side. Xray obtained today.     47-year-old male in the office today for follow-up evaluation of his left foot pain.  He is reporting that the plantar fascia pain has resolved but now he is experiencing pain along the lateral side of his foot particularly at the base of the fifth metatarsal.  He complains that is worse with standing and activities and improves with sitting.  He denies any known traumatic injury.      Past Medical History:   Diagnosis Date   • Abnormal EKG    • Achilles tendinitis, left leg    • Acute bronchitis    • Atrial fibrillation (HCC) 2002   • Callus    • Chest pain, atypical 9/25/2019   • Dyspnea on exertion 4/29/2020   • Fever    • Generalized anxiety disorder    • Gout    • Health examination of defined subpopulation    • Heart murmur 2002   • Heart valve disease 2019   • Hip pain    • Hypertension    • Ingrown toenail    • Lumbar back pain     Pain radiating to lumbar region of back   • Obesity    • On long term drug therapy    • Osteoarthritis of hip    • Other long term (current) drug therapy          Past Surgical History:   Procedure Laterality Date   • CHOLECYSTECTOMY WITH INTRAOPERATIVE CHOLANGIOGRAM N/A 4/21/2022    Procedure: CHOLECYSTECTOMY LAPAROSCOPIC INTRAOPERATIVE CHOLANGIOGRAM;  Surgeon: Marcus Hollis MD;  Location: [facility];  Service: General;  Laterality: N/A;   • HIP SURGERY Right    • VASECTOMY     • WISDOM TOOTH EXTRACTION           Family History   Problem Relation Age of Onset   • Hyperlipidemia Father    • Hypertension Father    • Cancer Paternal Grandfather         Colorectal Cancer   • Heart disease Mother    • No Known Problems Sister    • Heart disease Maternal Grandmother    • Diabetes Paternal  Grandmother    • No Known Problems Sister    • No Known Problems Sister    • No Known Problems Son    • No Known Problems Son    • No Known Problems Daughter    • No Known Problems Daughter    • Cancer Maternal Aunt        Allergies   Allergen Reactions   • Colcrys [Colchicine] Hives   • Diclofenac Other (See Comments)     Skin irritation, hot spots and sweating   • Mobic [Meloxicam] Palpitations       Social History     Socioeconomic History   • Marital status:      Spouse name: Tomas   • Number of children: 2   Tobacco Use   • Smoking status: Former     Packs/day: 1.50     Years: 4.00     Pack years: 6.00     Types: Cigarettes     Start date: 1996     Quit date:      Years since quittin.0   • Smokeless tobacco: Current     Types: Snuff   Vaping Use   • Vaping Use: Never used   Substance and Sexual Activity   • Alcohol use: No   • Drug use: No   • Sexual activity: Yes     Partners: Female     Birth control/protection: Surgical         Current Outpatient Medications   Medication Sig Dispense Refill   • allopurinol (ZYLOPRIM) 100 MG tablet TAKE 1 TABLET BY MOUTH DAILY 30 tablet 5   • clonazePAM (KlonoPIN) 0.5 MG tablet TAKE 1 TABLET BY MOUTH TWO TIMES A DAY AS NEEDED FOR ANXIETY (MAY CAUSE DROWSINESS) 60 tablet 0   • cyclobenzaprine (FLEXERIL) 10 MG tablet TAKE 1 TABLET BY MOUTH THREE TIMES A DAY AS NEEDED FOR MUSCLE SPASMS 60 tablet 5   • fexofenadine (ALLEGRA) 180 MG tablet Take 1 tablet by mouth Daily. 30 tablet 5   • FLUoxetine (PROzac) 20 MG capsule Take 1 capsule by mouth Daily. 30 capsule 5   • losartan (Cozaar) 25 MG tablet Take 1 tablet by mouth Daily. 90 tablet 3   • metoprolol succinate XL (TOPROL-XL) 50 MG 24 hr tablet TAKE 1 TABLET BY MOUTH EVERY EVENING 90 tablet 1   • methylPREDNISolone (MEDROL) 4 MG dose pack Use as directed by package instructions 21 tablet 0   • nabumetone (RELAFEN) 750 MG tablet Take 1 tablet by mouth 2 (Two) Times a Day. 60 tablet 3     No current  "facility-administered medications for this visit.       Review of Systems   Musculoskeletal:        Foot pain    All other systems reviewed and are negative.        OBJECTIVE    Pulse 81   Ht 190.5 cm (75\")   Wt (!) 162 kg (358 lb)   SpO2 98%   BMI 44.75 kg/m²     Physical Exam  Vitals reviewed.   Constitutional:       Appearance: Normal appearance. He is well-developed.   HENT:      Head: Normocephalic and atraumatic.   Neck:      Trachea: Trachea and phonation normal.   Cardiovascular:      Pulses:           Dorsalis pedis pulses are 2+ on the right side and 2+ on the left side.        Posterior tibial pulses are 2+ on the right side and 2+ on the left side.   Pulmonary:      Effort: Pulmonary effort is normal. No respiratory distress.   Abdominal:      General: There is no distension.      Palpations: Abdomen is soft.   Musculoskeletal:        Feet:    Feet:      Right foot:      Protective Sensation: 10 sites tested. 10 sites sensed.      Skin integrity: Skin integrity normal.      Toenail Condition: Right toenails are normal.      Left foot:      Protective Sensation: 10 sites tested. 10 sites sensed.      Skin integrity: Skin integrity normal.      Toenail Condition: Left toenails are normal.   Skin:     General: Skin is warm and dry.   Neurological:      Mental Status: He is alert and oriented to person, place, and time.      GCS: GCS eye subscore is 4. GCS verbal subscore is 5. GCS motor subscore is 6.   Psychiatric:         Speech: Speech normal.         Behavior: Behavior normal. Behavior is cooperative.         Thought Content: Thought content normal.         Judgment: Judgment normal.        Reviewed x-rays obtained on August 25, 2022 as well as those obtained today at the clinic.  There is no evidence of acute fracture, dislocation or other radiological abnormality.    Reviewed previous medical records.        Procedures        ASSESSMENT AND PLAN    Diagnoses and all orders for this visit:    1. " Peroneal tendinitis of left lower leg (Primary)  -     Ambulatory Referral to Physical Therapy Evaluate and treat    2. Left foot pain  -     XR Foot 3+ View Left  -     Ambulatory Referral to Physical Therapy Evaluate and treat    3. Plantar fasciitis    Other orders  -     nabumetone (RELAFEN) 750 MG tablet; Take 1 tablet by mouth 2 (Two) Times a Day.  Dispense: 60 tablet; Refill: 3  -     methylPREDNISolone (MEDROL) 4 MG dose pack; Use as directed by package instructions  Dispense: 21 tablet; Refill: 0        Refill the Relafen today in office and recommended a course of steroids and physical therapy.  The patient is requesting go to [facility] for his therapy.  We instructed him to continue to progress his range of motion activity as tolerated, continue his home exercise program and follow-up in 1 month for recheck.  If symptoms worsen then at that point we will proceed with an MRI.          This document has been electronically signed by Aiden HICKEY, FNP-C, ONP-C on January 25, 2023 09:05 CST

## 2023-02-07 DIAGNOSIS — F41.1 GENERALIZED ANXIETY DISORDER: Chronic | ICD-10-CM

## 2023-02-07 DIAGNOSIS — M16.51 POST-TRAUMATIC OSTEOARTHRITIS OF RIGHT HIP: Chronic | ICD-10-CM

## 2023-02-07 DIAGNOSIS — M1A.0710 IDIOPATHIC CHRONIC GOUT OF RIGHT FOOT WITHOUT TOPHUS: Chronic | ICD-10-CM

## 2023-02-07 DIAGNOSIS — F41.0 PANIC ATTACKS: ICD-10-CM

## 2023-02-07 RX ORDER — CYCLOBENZAPRINE HCL 10 MG
10 TABLET ORAL 3 TIMES DAILY PRN
Qty: 60 TABLET | Refills: 5 | Status: SHIPPED | OUTPATIENT
Start: 2023-02-07

## 2023-02-07 RX ORDER — CYCLOBENZAPRINE HCL 10 MG
TABLET ORAL
Qty: 60 TABLET | Refills: 5 | OUTPATIENT
Start: 2023-02-07

## 2023-02-07 RX ORDER — ALLOPURINOL 100 MG/1
100 TABLET ORAL DAILY
Qty: 30 TABLET | Refills: 5 | OUTPATIENT
Start: 2023-02-07

## 2023-02-07 RX ORDER — ALLOPURINOL 100 MG/1
100 TABLET ORAL DAILY
Qty: 30 TABLET | Refills: 5 | Status: SHIPPED | OUTPATIENT
Start: 2023-02-07

## 2023-02-07 RX ORDER — FLUOXETINE HYDROCHLORIDE 20 MG/1
20 CAPSULE ORAL DAILY
Qty: 30 CAPSULE | Refills: 5 | OUTPATIENT
Start: 2023-02-07

## 2023-02-07 RX ORDER — CLONAZEPAM 0.5 MG/1
TABLET ORAL
Qty: 60 TABLET | Refills: 0 | Status: SHIPPED | OUTPATIENT
Start: 2023-02-07 | End: 2023-03-20

## 2023-02-07 RX ORDER — FLUOXETINE HYDROCHLORIDE 20 MG/1
20 CAPSULE ORAL DAILY
Qty: 30 CAPSULE | Refills: 5 | Status: SHIPPED | OUTPATIENT
Start: 2023-02-07

## 2023-02-07 NOTE — TELEPHONE ENCOUNTER
Allopurinol, Flexeril, Prozac refill request was sent to Dr Sotomayor. I denied the medication and sent the refill request to the patient's PCP, EDELMIRA Hidalgo. Send to [facility].

## 2023-02-08 ENCOUNTER — OFFICE VISIT (OUTPATIENT)
Dept: FAMILY MEDICINE CLINIC | Facility: CLINIC | Age: 48
End: 2023-02-08
Payer: COMMERCIAL

## 2023-02-08 VITALS
BODY MASS INDEX: 39.17 KG/M2 | HEART RATE: 89 BPM | WEIGHT: 315 LBS | DIASTOLIC BLOOD PRESSURE: 84 MMHG | HEIGHT: 75 IN | OXYGEN SATURATION: 97 % | SYSTOLIC BLOOD PRESSURE: 132 MMHG

## 2023-02-08 DIAGNOSIS — M54.41 CHRONIC BILATERAL LOW BACK PAIN WITH BILATERAL SCIATICA: Chronic | ICD-10-CM

## 2023-02-08 DIAGNOSIS — M1A.0710 IDIOPATHIC CHRONIC GOUT OF RIGHT FOOT WITHOUT TOPHUS: Chronic | ICD-10-CM

## 2023-02-08 DIAGNOSIS — M54.42 CHRONIC BILATERAL LOW BACK PAIN WITH BILATERAL SCIATICA: Chronic | ICD-10-CM

## 2023-02-08 DIAGNOSIS — Z12.11 SCREENING FOR COLON CANCER: ICD-10-CM

## 2023-02-08 DIAGNOSIS — F41.1 GENERALIZED ANXIETY DISORDER: Chronic | ICD-10-CM

## 2023-02-08 DIAGNOSIS — M15.9 PRIMARY OSTEOARTHRITIS INVOLVING MULTIPLE JOINTS: Chronic | ICD-10-CM

## 2023-02-08 DIAGNOSIS — I42.2 HYPERTROPHIC CARDIOMYOPATHY: Chronic | ICD-10-CM

## 2023-02-08 DIAGNOSIS — G89.29 CHRONIC BILATERAL LOW BACK PAIN WITH BILATERAL SCIATICA: Chronic | ICD-10-CM

## 2023-02-08 DIAGNOSIS — I77.810 AORTIC ECTASIA, THORACIC: Chronic | ICD-10-CM

## 2023-02-08 DIAGNOSIS — E66.01 MORBID OBESITY WITH BMI OF 40.0-44.9, ADULT: Chronic | ICD-10-CM

## 2023-02-08 DIAGNOSIS — Z76.89 ENCOUNTER TO ESTABLISH CARE: Primary | ICD-10-CM

## 2023-02-08 DIAGNOSIS — E78.2 MIXED HYPERLIPIDEMIA: Chronic | ICD-10-CM

## 2023-02-08 PROCEDURE — 99214 OFFICE O/P EST MOD 30 MIN: CPT | Performed by: NURSE PRACTITIONER

## 2023-02-08 NOTE — PROGRESS NOTES
CC: Establish Care (Talk about weight loss )      Subjective:  Suhail Gamino is a 47 y.o. male who presents for         Patient presents to office to establish care today.  Formerly a patient of Dr. Urena.  He is due for labs.  This is a 6-month follow-up on medical conditions.  He has anxiety in which she takes Prozac and Klonopin for.  This helps control his anxiety.  He has hypertrophic cardiomyopathy with ectasia of the aorta.  He is followed by cardiology and this is stable.  He takes metoprolol and Losartan for. he has hyperlipidemia which is controlled by diet currently.  He has gout which he takes allopurinol daily for preventative measures.  He denies any recent flares in the gout.  He has osteoarthritis that he takes an over-the-counter NSAID for.  He has muscle spasms which he takes Flexeril as needed for.  He has chronic bilateral low back pain with bilateral sciatica.  He had an MRI done per Dr. Sotomayor on 8/1/2022 that showed multilevel degenerative changes of the lumbar spine.  He takes Relafen for this. He would like to discuss his weight. States he can't lose weight despite diet and exercise.    He is due for screening colonoscopy. Is willing to do cologuard.    He is due for pneumonia vaccine today.  He is also due for COVID #3 but does not plan on getting this. He declines the pneumonia vaccine today.      The following portions of the patient's history were reviewed and updated as appropriate: allergies, current medications, past family history, past medical history, past social history, past surgical history and problem list.    Past Medical History:   Diagnosis Date   • Abnormal EKG    • Achilles tendinitis, left leg    • Acute bronchitis    • Atrial fibrillation (HCC) 2002   • Callus    • Chest pain, atypical 9/25/2019   • Dyspnea on exertion 4/29/2020   • Fever    • Generalized anxiety disorder    • Gout    • Health examination of defined subpopulation    • Heart murmur 2002  "  • Heart valve disease 2019   • Hip pain    • Hypertension    • Ingrown toenail    • Lumbar back pain     Pain radiating to lumbar region of back   • Obesity    • On long term drug therapy    • Osteoarthritis of hip    • Other long term (current) drug therapy          Current Outpatient Medications:   •  allopurinol (ZYLOPRIM) 100 MG tablet, Take 1 tablet by mouth Daily., Disp: 30 tablet, Rfl: 5  •  clonazePAM (KlonoPIN) 0.5 MG tablet, TAKE 1 TABLET BY MOUTH TWO TIMES A DAY AS NEEDED FOR ANXIETY (MAY CAUSE DROWSINESS), Disp: 60 tablet, Rfl: 0  •  cyclobenzaprine (FLEXERIL) 10 MG tablet, Take 1 tablet by mouth 3 (Three) Times a Day As Needed for Muscle Spasms., Disp: 60 tablet, Rfl: 5  •  fexofenadine (ALLEGRA) 180 MG tablet, Take 1 tablet by mouth Daily., Disp: 30 tablet, Rfl: 5  •  FLUoxetine (PROzac) 20 MG capsule, Take 1 capsule by mouth Daily., Disp: 30 capsule, Rfl: 5  •  losartan (Cozaar) 25 MG tablet, Take 1 tablet by mouth Daily., Disp: 90 tablet, Rfl: 3  •  metoprolol succinate XL (TOPROL-XL) 50 MG 24 hr tablet, TAKE 1 TABLET BY MOUTH EVERY EVENING, Disp: 90 tablet, Rfl: 1  •  nabumetone (RELAFEN) 750 MG tablet, Take 1 tablet by mouth 2 (Two) Times a Day., Disp: 60 tablet, Rfl: 3    Review of Systems    Review of Systems   Constitutional: Negative.    HENT: Negative.    Eyes: Negative.    Respiratory: Negative.    Cardiovascular: Negative.    Gastrointestinal: Negative.    Endocrine: Negative.    Genitourinary: Negative.    Musculoskeletal: Positive for back pain and myalgias.   Skin: Negative.    Allergic/Immunologic: Negative.    Neurological: Negative.    Hematological: Negative.    Psychiatric/Behavioral: Negative.    All other systems reviewed and are negative.      Objective  Vitals:    02/08/23 1053   BP: 132/84   Pulse: 89   SpO2: 97%   Weight: (!) 160 kg (353 lb 3.2 oz)   Height: 190.5 cm (75\")     Body mass index is 44.15 kg/m².    Physical Exam    Physical Exam  Vitals and nursing note reviewed. "   Constitutional:       General: He is not in acute distress.     Appearance: Normal appearance. He is not ill-appearing, toxic-appearing or diaphoretic.   HENT:      Head: Normocephalic and atraumatic.   Cardiovascular:      Rate and Rhythm: Normal rate and regular rhythm.      Pulses: Normal pulses.      Heart sounds: Normal heart sounds. No murmur heard.    No friction rub. No gallop.   Pulmonary:      Effort: Pulmonary effort is normal. No respiratory distress.      Breath sounds: Normal breath sounds. No stridor. No wheezing, rhonchi or rales.   Chest:      Chest wall: No tenderness.   Musculoskeletal:      Cervical back: Normal range of motion and neck supple.   Neurological:      Mental Status: He is alert.   Psychiatric:         Attention and Perception: Attention and perception normal.         Mood and Affect: Mood and affect normal.         Speech: Speech normal.         Behavior: Behavior normal. Behavior is cooperative.         Thought Content: Thought content normal.         Cognition and Memory: Cognition normal.         Judgment: Judgment normal.      Comments: Patient is well-kempt, good eye contact, answers questions appropriately.             Diagnoses and all orders for this visit:    1. Encounter to establish care (Primary)    2. Generalized anxiety disorder    3. Hypertrophic cardiomyopathy (HCC)    4. Aortic ectasia, thoracic (HCC)    5. Mixed hyperlipidemia    6. Idiopathic chronic gout of right foot without tophus    7. Primary osteoarthritis involving multiple joints    8. Chronic bilateral low back pain with bilateral sciatica    9. Morbid obesity with BMI of 40.0-44.9, adult (HCC)  -     Insulin, Free & Total, Serum; Future    10. Screening for colon cancer  -     Cologuard - Stool, Per Rectum; Future       Patient in for 6-month follow-up on medical conditions.  He is encouraged to get his routine labs drawn.  These have been previously ordered.  We will also add an insulin level to check  for insulin resistance.  Patient does not need any medications filled today.  I filled his Klonopin on yesterday. Patient understands the risks associated with this controlled medication, including tolerance and addiction.  he also agrees to only obtain this medication from me, and not from a another provider, unless that provider is covering for me in my absence.  he also agrees to be compliant in dosing, and not self adjust the dose of medication.  A signed controlled substance agreement is on file, and he has received a controlled substance education sheet at this a previous visit.  he has also signed a consent for treatment with a controlled substance as per [facility] policy. ABDULLAHI was obtained.  He is counseled on needed immunizations.  He declines update and pneumonia vaccine or COVID.  He is Unseld on the need for screening colonoscopy.  He is willing to do the Cologuard.  This has been ordered.  He is advised to keep his scheduled follow-up appointments with cardiology.  He is to follow-up in 3 months or sooner if needed for recheck on his anxiety.  Answered all questions.  Patient verbalized understanding of plan of care.    Class 3 Severe Obesity (BMI >=40). Obesity-related health conditions include the following: coronary heart disease, dyslipidemias and osteoarthritis. Obesity is worsening. BMI is is above average; BMI management plan is completed. We discussed low calorie, low carb based diet program, portion control and increasing exercise.  Patient is also advised that there is Wegovy for weight loss.  He is going to call his insurance company to see if they will cover Wegovy.  If so, he will schedule an appointment to discuss starting Wegovy.            This document has been electronically signed by EDELMIRA Suero on February 8, 2023 11:27 CST

## 2023-02-20 ENCOUNTER — TELEPHONE (OUTPATIENT)
Dept: FAMILY MEDICINE CLINIC | Facility: CLINIC | Age: 48
End: 2023-02-20
Payer: COMMERCIAL

## 2023-02-20 NOTE — TELEPHONE ENCOUNTER
Attempted to contact the patient and remind him of the fasting lab orders in place, no answer. Left voice message per verbal release form advising to complete these when possible. Informed to contact providers office with further questions. Lab orders extended.

## 2023-03-13 DIAGNOSIS — I42.2 HYPERTROPHIC CARDIOMYOPATHY: ICD-10-CM

## 2023-03-13 RX ORDER — METOPROLOL SUCCINATE 50 MG/1
TABLET, EXTENDED RELEASE ORAL
Qty: 30 TABLET | Refills: 1 | Status: SHIPPED | OUTPATIENT
Start: 2023-03-13

## 2023-03-20 DIAGNOSIS — F41.1 GENERALIZED ANXIETY DISORDER: Chronic | ICD-10-CM

## 2023-03-20 DIAGNOSIS — F41.0 PANIC ATTACKS: ICD-10-CM

## 2023-03-20 RX ORDER — CLONAZEPAM 0.5 MG/1
TABLET ORAL
Qty: 60 TABLET | Refills: 0 | Status: SHIPPED | OUTPATIENT
Start: 2023-03-20

## 2023-03-24 ENCOUNTER — PATIENT ROUNDING (BHMG ONLY) (OUTPATIENT)
Dept: FAMILY MEDICINE CLINIC | Facility: CLINIC | Age: 48
End: 2023-03-24

## 2023-03-27 NOTE — PROGRESS NOTES
March 24, 2023    Hello, may I speak with Suhail Gamino?    My name is Shan, the clinical coordinator      [facility] - [address] [place] [id number] [phone].      Have been unable to reach patient by phone to complete patient rounding.

## 2023-04-10 ENCOUNTER — OFFICE VISIT (OUTPATIENT)
Dept: FAMILY MEDICINE CLINIC | Facility: CLINIC | Age: 48
End: 2023-04-10
Payer: COMMERCIAL

## 2023-04-10 VITALS
DIASTOLIC BLOOD PRESSURE: 90 MMHG | BODY MASS INDEX: 39.17 KG/M2 | HEART RATE: 82 BPM | SYSTOLIC BLOOD PRESSURE: 138 MMHG | WEIGHT: 315 LBS | OXYGEN SATURATION: 99 % | HEIGHT: 75 IN

## 2023-04-10 DIAGNOSIS — M54.16 LUMBAR BACK PAIN WITH RADICULOPATHY AFFECTING LEFT LOWER EXTREMITY: Primary | ICD-10-CM

## 2023-04-10 PROCEDURE — 99213 OFFICE O/P EST LOW 20 MIN: CPT | Performed by: NURSE PRACTITIONER

## 2023-04-10 RX ORDER — KETOROLAC TROMETHAMINE 10 MG/1
10 TABLET, FILM COATED ORAL EVERY 6 HOURS PRN
Qty: 30 TABLET | Refills: 1 | Status: SHIPPED | OUTPATIENT
Start: 2023-04-10

## 2023-04-10 RX ORDER — PREDNISONE 20 MG/1
TABLET ORAL
COMMUNITY
Start: 2023-04-05

## 2023-04-10 RX ORDER — KETOROLAC TROMETHAMINE 10 MG/1
TABLET, FILM COATED ORAL
COMMUNITY
Start: 2023-04-05 | End: 2023-04-10 | Stop reason: SDUPTHER

## 2023-04-10 RX ORDER — GABAPENTIN 100 MG/1
100 CAPSULE ORAL 3 TIMES DAILY
Qty: 90 CAPSULE | Refills: 0 | Status: SHIPPED | OUTPATIENT
Start: 2023-04-10

## 2023-04-10 NOTE — PROGRESS NOTES
CC: Hospital Follow Up Visit ([facility] ER FU, severe back pain lower left )      Subjective:  Suhail Gamino is a 47 y.o. male who presents for         Patient presents to office for ER discharge follow-up.  He went to [facility] on 4/4/2023 with complaints of left lower back pain with radiculopathy affecting left lower extremity.  He had a CT scan of the lumbar spine which revealed no acute abnormalities.  He was treated with Toradol and prednisone and discharged home.  ER record was reviewed. He states his pain is a little better. Pain is located in the left lower back. States it hurts to get up and sit down. Continues to have radiculopathy to the Lt foot. States he has pain in his back when he puts pressure on his left foot. Denies known injury. States was getting off of a ladder at work and developed the pain. States that he took a muscle relaxer when he got home and went to bed. States when he woke up, he was unable to dress himself, the pain was so bad. The pain is constant. It is dull all the time. Has sharper pain with movement. Denies any paresthesias. Denies saddle anesthesia. Denies bowel or bladder incontinence. Rates pain at 6 currently on pain scale. States it was a 10 at onset. Has continued to take the Toradol, prednisone, and muscle relaxers.       The following portions of the patient's history were reviewed and updated as appropriate: allergies, current medications, past family history, past medical history, past social history, past surgical history and problem list.    Past Medical History:   Diagnosis Date   • Abnormal EKG    • Achilles tendinitis, left leg    • Acute bronchitis    • Atrial fibrillation 2002   • Callus    • Chest pain, atypical 9/25/2019   • Dyspnea on exertion 4/29/2020   • Fever    • Generalized anxiety disorder    • Gout    • Health examination of defined subpopulation    • Heart murmur 2002   • Heart valve disease 2019   • Hip pain     • Hypertension    • Ingrown toenail    • Lumbar back pain     Pain radiating to lumbar region of back   • Obesity    • On long term drug therapy    • Osteoarthritis of hip    • Other long term (current) drug therapy          Current Outpatient Medications:   •  allopurinol (ZYLOPRIM) 100 MG tablet, Take 1 tablet by mouth Daily., Disp: 30 tablet, Rfl: 5  •  clonazePAM (KlonoPIN) 0.5 MG tablet, TAKE 1 TABLET BY MOUTH TWO TIMES A DAY AS NEEDED FOR ANXIETY (MAY CAUSE DROWSINESS), Disp: 60 tablet, Rfl: 0  •  cyclobenzaprine (FLEXERIL) 10 MG tablet, Take 1 tablet by mouth 3 (Three) Times a Day As Needed for Muscle Spasms., Disp: 60 tablet, Rfl: 5  •  fexofenadine (ALLEGRA) 180 MG tablet, Take 1 tablet by mouth Daily., Disp: 30 tablet, Rfl: 5  •  FLUoxetine (PROzac) 20 MG capsule, Take 1 capsule by mouth Daily., Disp: 30 capsule, Rfl: 5  •  ketorolac (TORADOL) 10 MG tablet, Take 1 tablet by mouth Every 6 (Six) Hours As Needed for Moderate Pain., Disp: 30 tablet, Rfl: 1  •  losartan (Cozaar) 25 MG tablet, Take 1 tablet by mouth Daily., Disp: 90 tablet, Rfl: 3  •  metoprolol succinate XL (TOPROL-XL) 50 MG 24 hr tablet, TAKE 1 TABLET BY MOUTH EVERY EVENING, Disp: 30 tablet, Rfl: 1  •  nabumetone (RELAFEN) 750 MG tablet, Take 1 tablet by mouth 2 (Two) Times a Day., Disp: 60 tablet, Rfl: 3  •  predniSONE (DELTASONE) 20 MG tablet, TAKE 3 TABLETS (60 MG TOTAL) BY MOUTH DAILY FOR 5 DAYS., Disp: , Rfl:   •  gabapentin (NEURONTIN) 100 MG capsule, Take 1 capsule by mouth 3 (Three) Times a Day., Disp: 90 capsule, Rfl: 0    Review of Systems    Review of Systems   Constitutional: Negative.    HENT: Negative.    Eyes: Negative.    Respiratory: Negative.    Cardiovascular: Negative.    Gastrointestinal: Negative.    Endocrine: Negative.    Genitourinary: Negative.    Musculoskeletal: Positive for back pain.   Skin: Negative.    Allergic/Immunologic: Negative.    Neurological: Negative.    Hematological: Negative.   "  Psychiatric/Behavioral: Negative.    All other systems reviewed and are negative.      Objective  Vitals:    04/10/23 1117   BP: 138/90   Pulse: 82   SpO2: 99%   Weight: (!) 158 kg (348 lb 9.6 oz)   Height: 190.5 cm (75\")     Body mass index is 43.57 kg/m².    Physical Exam    Physical Exam  Vitals and nursing note reviewed.   Constitutional:       General: He is not in acute distress.     Appearance: Normal appearance. He is not ill-appearing, toxic-appearing or diaphoretic.   HENT:      Head: Normocephalic and atraumatic.   Cardiovascular:      Rate and Rhythm: Normal rate and regular rhythm.      Pulses: Normal pulses.      Heart sounds: Normal heart sounds. No murmur heard.    No friction rub. No gallop.   Pulmonary:      Effort: Pulmonary effort is normal. No respiratory distress.      Breath sounds: Normal breath sounds. No stridor. No wheezing, rhonchi or rales.   Chest:      Chest wall: No tenderness.   Musculoskeletal:      Cervical back: Normal range of motion and neck supple.      Right lower leg: No edema.      Left lower leg: No edema.      Comments: Lumbar spine- there is left sided paraspinous muscle tenderness on exam. There is lumbar tenderness on exam.   Neurological:      Mental Status: He is alert.             Diagnoses and all orders for this visit:    1. Lumbar back pain with radiculopathy affecting left lower extremity (Primary)  -     Ambulatory Referral to Physical Therapy Evaluate and treat  -     ketorolac (TORADOL) 10 MG tablet; Take 1 tablet by mouth Every 6 (Six) Hours As Needed for Moderate Pain.  Dispense: 30 tablet; Refill: 1  -     gabapentin (NEURONTIN) 100 MG capsule; Take 1 capsule by mouth 3 (Three) Times a Day.  Dispense: 90 capsule; Refill: 0         Patient in for ER follow-up he has lumbar back pain with radiculopathy affecting the left lower extremity.  We will refer him to physical therapy for evaluation and treatment.  We will refill the Toradol.  We will start patient " on gabapentin 100 mg p.o. 3 times daily to see if this will help with his pain.  He is instructed on how to take this medication and possible side effects. Patient understands the risks associated with this controlled medication, including tolerance and addiction.  he also agrees to only obtain this medication from me, and not from a another provider, unless that provider is covering for me in my absence.  he also agrees to be compliant in dosing, and not self adjust the dose of medication.  A signed controlled substance agreement is on file, and he has received a controlled substance education sheet at this a previous visit.  he has also signed a consent for treatment with a controlled substance as per [facility] policy. ABDULLAHI was obtained.  Patient is given a note to remain off work for at least 2 weeks.  He may need more time, we will see as patient has physical therapy.  He is advised to follow-up if no improvement with the physical therapy.  Otherwise, to follow-up in 3 months or sooner if needed.  Answered all questions.  Patient verbalized understanding of plan of care.        This document has been electronically signed by EDELMIRA Suero on April 10, 2023 11:34 CDT

## 2023-05-09 ENCOUNTER — OFFICE VISIT (OUTPATIENT)
Dept: FAMILY MEDICINE CLINIC | Facility: CLINIC | Age: 48
End: 2023-05-09
Payer: COMMERCIAL

## 2023-05-09 VITALS
WEIGHT: 315 LBS | DIASTOLIC BLOOD PRESSURE: 84 MMHG | HEIGHT: 75 IN | BODY MASS INDEX: 39.17 KG/M2 | OXYGEN SATURATION: 97 % | HEART RATE: 78 BPM | SYSTOLIC BLOOD PRESSURE: 128 MMHG

## 2023-05-09 DIAGNOSIS — E16.2 HYPOGLYCEMIA: ICD-10-CM

## 2023-05-09 DIAGNOSIS — M54.42 CHRONIC BILATERAL LOW BACK PAIN WITH BILATERAL SCIATICA: Chronic | ICD-10-CM

## 2023-05-09 DIAGNOSIS — E66.01 MORBID OBESITY WITH BMI OF 40.0-44.9, ADULT: Chronic | ICD-10-CM

## 2023-05-09 DIAGNOSIS — F41.0 PANIC ATTACKS: Chronic | ICD-10-CM

## 2023-05-09 DIAGNOSIS — M54.41 CHRONIC BILATERAL LOW BACK PAIN WITH BILATERAL SCIATICA: Chronic | ICD-10-CM

## 2023-05-09 DIAGNOSIS — G89.29 CHRONIC BILATERAL LOW BACK PAIN WITH BILATERAL SCIATICA: Chronic | ICD-10-CM

## 2023-05-09 DIAGNOSIS — F41.1 GENERALIZED ANXIETY DISORDER: Primary | Chronic | ICD-10-CM

## 2023-05-09 PROCEDURE — 99214 OFFICE O/P EST MOD 30 MIN: CPT | Performed by: NURSE PRACTITIONER

## 2023-05-09 RX ORDER — CLONAZEPAM 0.5 MG/1
0.5 TABLET ORAL 2 TIMES DAILY PRN
Qty: 60 TABLET | Refills: 0 | Status: SHIPPED | OUTPATIENT
Start: 2023-05-09

## 2023-05-09 NOTE — PROGRESS NOTES
CC: Follow-up (3 month FU, noticing blood sugar dropping )      Subjective:  Suhail Gamino is a 47 y.o. male who presents for         Patient presents to office for follow-up on lumbar back pain with radiculopathy affecting the left lower extremity and generalized anxiety disorder.  For the lumbar back pain he takes gabapentin 100 mg p.o. 3 times daily. He states he has only been taking this once daily.  This has been helping with his pain.  He is also going to physical therapy which is helping with his symptoms.  He is using a TENS unit and inversion table.  For the generalized anxiety disorder he takes Klonopin 0.5 mg twice daily as needed.  This does help with his anxiety.  He does not needed dose increases of these medications today.    He states he has been trying to lose weight and has noted that his blood sugar has been dropping around once weekly. States his vision will get very clear, he will break out in a sweat, feel clammy and shaky. States he checked it one night and it was 48.      The following portions of the patient's history were reviewed and updated as appropriate: allergies, current medications, past family history, past medical history, past social history, past surgical history and problem list.    Past Medical History:   Diagnosis Date   • Abnormal EKG    • Achilles tendinitis, left leg    • Acute bronchitis    • Atrial fibrillation 2002   • Callus    • Chest pain, atypical 9/25/2019   • Dyspnea on exertion 4/29/2020   • Fever    • Generalized anxiety disorder    • Gout    • Health examination of defined subpopulation    • Heart murmur 2002   • Heart valve disease 2019   • Hip pain    • Hypertension    • Ingrown toenail    • Lumbar back pain     Pain radiating to lumbar region of back   • Obesity    • On long term drug therapy    • Osteoarthritis of hip    • Other long term (current) drug therapy          Current Outpatient Medications:   •  allopurinol (ZYLOPRIM) 100 MG tablet,  "Take 1 tablet by mouth Daily., Disp: 30 tablet, Rfl: 5  •  clonazePAM (KlonoPIN) 0.5 MG tablet, Take 1 tablet by mouth 2 (Two) Times a Day As Needed for Anxiety., Disp: 60 tablet, Rfl: 0  •  cyclobenzaprine (FLEXERIL) 10 MG tablet, Take 1 tablet by mouth 3 (Three) Times a Day As Needed for Muscle Spasms., Disp: 60 tablet, Rfl: 5  •  fexofenadine (ALLEGRA) 180 MG tablet, Take 1 tablet by mouth Daily., Disp: 30 tablet, Rfl: 5  •  FLUoxetine (PROzac) 20 MG capsule, Take 1 capsule by mouth Daily., Disp: 30 capsule, Rfl: 5  •  gabapentin (NEURONTIN) 100 MG capsule, Take 1 capsule by mouth 3 (Three) Times a Day., Disp: 90 capsule, Rfl: 0  •  ketorolac (TORADOL) 10 MG tablet, Take 1 tablet by mouth Every 6 (Six) Hours As Needed for Moderate Pain., Disp: 30 tablet, Rfl: 1  •  losartan (Cozaar) 25 MG tablet, Take 1 tablet by mouth Daily., Disp: 90 tablet, Rfl: 3  •  metoprolol succinate XL (TOPROL-XL) 50 MG 24 hr tablet, TAKE 1 TABLET BY MOUTH EVERY EVENING, Disp: 30 tablet, Rfl: 1  •  nabumetone (RELAFEN) 750 MG tablet, Take 1 tablet by mouth 2 (Two) Times a Day., Disp: 60 tablet, Rfl: 3  •  predniSONE (DELTASONE) 20 MG tablet, TAKE 3 TABLETS (60 MG TOTAL) BY MOUTH DAILY FOR 5 DAYS., Disp: , Rfl:     Review of Systems    Review of Systems   Constitutional: Negative.    HENT: Negative.    Eyes: Negative.    Respiratory: Negative.    Cardiovascular: Negative.    Gastrointestinal: Negative.    Endocrine: Negative.    Genitourinary: Negative.    Musculoskeletal: Positive for back pain.   Skin: Negative.    Allergic/Immunologic: Negative.    Neurological: Negative.    Hematological: Negative.    Psychiatric/Behavioral: The patient is nervous/anxious.    All other systems reviewed and are negative.      Objective  Vitals:    05/09/23 0909   BP: 128/84   Pulse: 78   SpO2: 97%   Weight: (!) 161 kg (354 lb 6.4 oz)   Height: 190.5 cm (75\")     Body mass index is 44.3 kg/m².    Physical Exam    Physical Exam  Vitals and nursing note " reviewed.   Constitutional:       General: He is not in acute distress.     Appearance: Normal appearance. He is not ill-appearing, toxic-appearing or diaphoretic.   HENT:      Head: Normocephalic and atraumatic.   Cardiovascular:      Rate and Rhythm: Normal rate and regular rhythm.      Pulses: Normal pulses.      Heart sounds: Normal heart sounds. No murmur heard.    No friction rub. No gallop.   Pulmonary:      Effort: Pulmonary effort is normal. No respiratory distress.      Breath sounds: Normal breath sounds. No stridor. No wheezing, rhonchi or rales.   Chest:      Chest wall: No tenderness.   Musculoskeletal:      Cervical back: Normal range of motion and neck supple.   Neurological:      Mental Status: He is alert.             Diagnoses and all orders for this visit:    1. Generalized anxiety disorder (Primary)  -     clonazePAM (KlonoPIN) 0.5 MG tablet; Take 1 tablet by mouth 2 (Two) Times a Day As Needed for Anxiety.  Dispense: 60 tablet; Refill: 0    2. Panic attacks  -     clonazePAM (KlonoPIN) 0.5 MG tablet; Take 1 tablet by mouth 2 (Two) Times a Day As Needed for Anxiety.  Dispense: 60 tablet; Refill: 0    3. Chronic bilateral low back pain with bilateral sciatica    4. Morbid obesity with BMI of 40.0-44.9, adult (HCC)    5. Hypoglycemia       Patient in for 3-month follow-up on anxiety and chronic back pain.  He is to continue the Klonopin as needed.  This has been refilled today as requested.  For the bilateral low back pain with bilateral sciatica patient to continue the gabapentin daily.  He does not need a refill on this today. Patient understands the risks associated with this controlled medication, including tolerance and addiction.  he also agrees to only obtain this medication from me, and not from a another provider, unless that provider is covering for me in my absence.  he also agrees to be compliant in dosing, and not self adjust the dose of medication.  A signed controlled substance  agreement is on file, and he has received a controlled substance education sheet at this a previous visit.  he has also signed a consent for treatment with a controlled substance as per [facility] policy. ABDULLAHI was obtained.  For the obesity, patient is counseled on a well-balanced diet of 15 to 1800 aba/day. Class 3 Severe Obesity (BMI >=40). Obesity-related health conditions include the following: hypertension. Obesity is worsening. BMI is is above average; BMI management plan is completed. We discussed low calorie, low carb based diet program, portion control and increasing exercise.  Hypoglycemia, patient is encouraged to make sure he is getting enough protein in his diet.  He is advised to obtain glucose tablets and to take these if his sugar feels low.  Patient to follow-up in 3 months or sooner if needed for recheck on his lumbar back pain and anxiety.  Answered all questions.  Patient verbalized understanding of plan of care.            This document has been electronically signed by EDELMIRA Suero on May 9, 2023 09:25 CDT

## 2023-05-11 ENCOUNTER — LAB (OUTPATIENT)
Dept: LAB | Facility: OTHER | Age: 48
End: 2023-05-11
Payer: COMMERCIAL

## 2023-05-11 DIAGNOSIS — E66.01 MORBID OBESITY WITH BMI OF 40.0-44.9, ADULT: Chronic | ICD-10-CM

## 2023-05-11 DIAGNOSIS — E78.2 MIXED HYPERLIPIDEMIA: ICD-10-CM

## 2023-05-11 LAB
ALBUMIN SERPL-MCNC: 4.1 G/DL (ref 3.5–5)
ALBUMIN/GLOB SERPL: 1.2 G/DL (ref 1.1–1.8)
ALP SERPL-CCNC: 89 U/L (ref 38–126)
ALT SERPL W P-5'-P-CCNC: 24 U/L
ANION GAP SERPL CALCULATED.3IONS-SCNC: 6 MMOL/L (ref 5–15)
AST SERPL-CCNC: 26 U/L (ref 17–59)
BASOPHILS # BLD AUTO: 0.12 10*3/MM3 (ref 0–0.2)
BASOPHILS NFR BLD AUTO: 1.4 % (ref 0–1.5)
BILIRUB SERPL-MCNC: 0.6 MG/DL (ref 0.2–1.3)
BILIRUB UR QL STRIP: NEGATIVE
BUN SERPL-MCNC: 14 MG/DL (ref 7–23)
BUN/CREAT SERPL: 15.6 (ref 7–25)
CALCIUM SPEC-SCNC: 9.6 MG/DL (ref 8.4–10.2)
CHLORIDE SERPL-SCNC: 102 MMOL/L (ref 101–112)
CHOLEST SERPL-MCNC: 196 MG/DL (ref 150–200)
CLARITY UR: ABNORMAL
CO2 SERPL-SCNC: 30 MMOL/L (ref 22–30)
COLOR UR: ABNORMAL
CREAT SERPL-MCNC: 0.9 MG/DL (ref 0.7–1.3)
DEPRECATED RDW RBC AUTO: 43.8 FL (ref 37–54)
EGFRCR SERPLBLD CKD-EPI 2021: 107.3 ML/MIN/1.73
EOSINOPHIL # BLD AUTO: 0.13 10*3/MM3 (ref 0–0.4)
EOSINOPHIL NFR BLD AUTO: 1.5 % (ref 0.3–6.2)
ERYTHROCYTE [DISTWIDTH] IN BLOOD BY AUTOMATED COUNT: 13.5 % (ref 12.3–15.4)
GLOBULIN UR ELPH-MCNC: 3.5 GM/DL (ref 2.3–3.5)
GLUCOSE SERPL-MCNC: 94 MG/DL (ref 70–99)
GLUCOSE UR STRIP-MCNC: NEGATIVE MG/DL
HCT VFR BLD AUTO: 51.6 % (ref 37.5–51)
HDLC SERPL-MCNC: 38 MG/DL (ref 40–59)
HGB BLD-MCNC: 17.2 G/DL (ref 13–17.7)
HGB UR QL STRIP.AUTO: NEGATIVE
KETONES UR QL STRIP: NEGATIVE
LDLC SERPL CALC-MCNC: 119 MG/DL
LDLC/HDLC SERPL: 2.98 {RATIO} (ref 0–3.55)
LEUKOCYTE ESTERASE UR QL STRIP.AUTO: NEGATIVE
LYMPHOCYTES # BLD AUTO: 2.23 10*3/MM3 (ref 0.7–3.1)
LYMPHOCYTES NFR BLD AUTO: 25.8 % (ref 19.6–45.3)
MCH RBC QN AUTO: 29.8 PG (ref 26.6–33)
MCHC RBC AUTO-ENTMCNC: 33.3 G/DL (ref 31.5–35.7)
MCV RBC AUTO: 89.4 FL (ref 79–97)
MONOCYTES # BLD AUTO: 0.69 10*3/MM3 (ref 0.1–0.9)
MONOCYTES NFR BLD AUTO: 8 % (ref 5–12)
NEUTROPHILS NFR BLD AUTO: 5.47 10*3/MM3 (ref 1.7–7)
NEUTROPHILS NFR BLD AUTO: 63.3 % (ref 42.7–76)
NITRITE UR QL STRIP: NEGATIVE
PH UR STRIP.AUTO: 6 [PH] (ref 5.5–8)
PLATELET # BLD AUTO: 287 10*3/MM3 (ref 140–450)
PMV BLD AUTO: 9.6 FL (ref 6–12)
POTASSIUM SERPL-SCNC: 4.4 MMOL/L (ref 3.4–5)
PROT SERPL-MCNC: 7.6 G/DL (ref 6.3–8.6)
PROT UR QL STRIP: NEGATIVE
RBC # BLD AUTO: 5.77 10*6/MM3 (ref 4.14–5.8)
SODIUM SERPL-SCNC: 138 MMOL/L (ref 137–145)
SP GR UR STRIP: 1.02 (ref 1–1.03)
T4 FREE SERPL-MCNC: 0.98 NG/DL (ref 0.93–1.7)
TRIGL SERPL-MCNC: 223 MG/DL
TSH SERPL DL<=0.05 MIU/L-ACNC: 1.2 UIU/ML (ref 0.27–4.2)
UROBILINOGEN UR QL STRIP: ABNORMAL
VLDLC SERPL-MCNC: 39 MG/DL (ref 5–40)
WBC NRBC COR # BLD: 8.64 10*3/MM3 (ref 3.4–10.8)

## 2023-05-11 PROCEDURE — 80050 GENERAL HEALTH PANEL: CPT | Performed by: NURSE PRACTITIONER

## 2023-05-11 PROCEDURE — 80061 LIPID PANEL: CPT | Performed by: NURSE PRACTITIONER

## 2023-05-11 PROCEDURE — 84439 ASSAY OF FREE THYROXINE: CPT | Performed by: NURSE PRACTITIONER

## 2023-05-11 PROCEDURE — 83527 ASSAY OF INSULIN: CPT | Performed by: NURSE PRACTITIONER

## 2023-05-11 PROCEDURE — 36415 COLL VENOUS BLD VENIPUNCTURE: CPT | Performed by: NURSE PRACTITIONER

## 2023-05-11 PROCEDURE — 83525 ASSAY OF INSULIN: CPT | Performed by: NURSE PRACTITIONER

## 2023-05-11 PROCEDURE — 81003 URINALYSIS AUTO W/O SCOPE: CPT | Performed by: NURSE PRACTITIONER

## 2023-05-15 DIAGNOSIS — I42.2 HYPERTROPHIC CARDIOMYOPATHY: ICD-10-CM

## 2023-05-15 RX ORDER — METOPROLOL SUCCINATE 50 MG/1
TABLET, EXTENDED RELEASE ORAL
Qty: 30 TABLET | Refills: 1 | Status: SHIPPED | OUTPATIENT
Start: 2023-05-15

## 2023-05-16 RX ORDER — ROSUVASTATIN CALCIUM 10 MG/1
10 TABLET, COATED ORAL DAILY
Qty: 30 TABLET | Refills: 5 | Status: SHIPPED | OUTPATIENT
Start: 2023-05-16

## 2023-05-20 LAB
INSULIN FREE SERPL-ACNC: 69 UU/ML
INSULIN SERPL-ACNC: 69 UU/ML

## 2023-05-25 ENCOUNTER — TELEPHONE (OUTPATIENT)
Dept: FAMILY MEDICINE CLINIC | Facility: CLINIC | Age: 48
End: 2023-05-25
Payer: COMMERCIAL

## 2023-05-25 DIAGNOSIS — M54.41 CHRONIC BILATERAL LOW BACK PAIN WITH BILATERAL SCIATICA: Primary | ICD-10-CM

## 2023-05-25 DIAGNOSIS — M54.42 CHRONIC BILATERAL LOW BACK PAIN WITH BILATERAL SCIATICA: Primary | ICD-10-CM

## 2023-05-25 DIAGNOSIS — G89.29 CHRONIC BILATERAL LOW BACK PAIN WITH BILATERAL SCIATICA: Primary | ICD-10-CM

## 2023-05-25 NOTE — TELEPHONE ENCOUNTER
Patient called office today stating today is his last day of physical therapy.  He states that physical therapist has advised he needs an MRI as he has failed outpatient therapy with the physical therapy.  We will order an MRI.  He request MRI to be done in [place].

## 2023-06-02 DIAGNOSIS — M54.16 LUMBAR BACK PAIN WITH RADICULOPATHY AFFECTING LEFT LOWER EXTREMITY: ICD-10-CM

## 2023-06-05 RX ORDER — GABAPENTIN 100 MG/1
100 CAPSULE ORAL 3 TIMES DAILY
Qty: 90 CAPSULE | Refills: 0 | Status: SHIPPED | OUTPATIENT
Start: 2023-06-05

## 2023-06-14 DIAGNOSIS — J30.1 CHRONIC SEASONAL ALLERGIC RHINITIS DUE TO POLLEN: Chronic | ICD-10-CM

## 2023-06-14 RX ORDER — FEXOFENADINE HCL 180 MG/1
180 TABLET ORAL DAILY
Qty: 30 TABLET | Refills: 5 | Status: SHIPPED | OUTPATIENT
Start: 2023-06-14

## 2023-06-19 DIAGNOSIS — F41.0 PANIC ATTACKS: Chronic | ICD-10-CM

## 2023-06-19 DIAGNOSIS — F41.1 GENERALIZED ANXIETY DISORDER: Chronic | ICD-10-CM

## 2023-06-19 RX ORDER — CLONAZEPAM 0.5 MG/1
0.5 TABLET ORAL 2 TIMES DAILY PRN
Qty: 60 TABLET | Refills: 0 | Status: SHIPPED | OUTPATIENT
Start: 2023-06-19

## 2023-07-10 PROBLEM — M54.16 LUMBAR BACK PAIN WITH RADICULOPATHY AFFECTING LEFT LOWER EXTREMITY: Chronic | Status: ACTIVE | Noted: 2023-07-10

## 2023-07-24 DIAGNOSIS — M1A.0710 IDIOPATHIC CHRONIC GOUT OF RIGHT FOOT WITHOUT TOPHUS: Chronic | ICD-10-CM

## 2023-07-24 RX ORDER — ALLOPURINOL 100 MG/1
100 TABLET ORAL DAILY
Qty: 30 TABLET | Refills: 5 | Status: SHIPPED | OUTPATIENT
Start: 2023-07-24

## 2023-07-31 ENCOUNTER — HOSPITAL ENCOUNTER (OUTPATIENT)
Dept: MRI IMAGING | Facility: HOSPITAL | Age: 48
Discharge: HOME | End: 2023-07-31
Payer: COMMERCIAL

## 2023-07-31 ENCOUNTER — TELEPHONE (OUTPATIENT)
Dept: PODIATRY | Facility: CLINIC | Age: 48
End: 2023-07-31
Payer: COMMERCIAL

## 2023-08-01 DIAGNOSIS — F41.0 PANIC ATTACKS: Chronic | ICD-10-CM

## 2023-08-01 DIAGNOSIS — F41.1 GENERALIZED ANXIETY DISORDER: Chronic | ICD-10-CM

## 2023-08-01 RX ORDER — CLONAZEPAM 0.5 MG/1
0.5 TABLET ORAL 2 TIMES DAILY PRN
Qty: 60 TABLET | Refills: 0 | Status: SHIPPED | OUTPATIENT
Start: 2023-08-01

## 2023-08-17 DIAGNOSIS — M79.672 LEFT FOOT PAIN: ICD-10-CM

## 2023-08-17 DIAGNOSIS — M76.72 PERONEAL TENDINITIS OF LEFT LOWER LEG: ICD-10-CM

## 2023-08-17 DIAGNOSIS — M79.671 RIGHT FOOT PAIN: ICD-10-CM

## 2023-08-17 DIAGNOSIS — M72.2 PLANTAR FASCIITIS: ICD-10-CM

## 2023-08-17 PROCEDURE — 73718 MRI LOWER EXTREMITY W/O DYE: CPT | Performed by: NURSE PRACTITIONER

## 2023-08-28 DIAGNOSIS — F41.1 GENERALIZED ANXIETY DISORDER: Chronic | ICD-10-CM

## 2023-08-28 RX ORDER — FLUOXETINE HYDROCHLORIDE 20 MG/1
20 CAPSULE ORAL DAILY
Qty: 30 CAPSULE | Refills: 5 | Status: SHIPPED | OUTPATIENT
Start: 2023-08-28

## 2023-09-12 DIAGNOSIS — F41.1 GENERALIZED ANXIETY DISORDER: Chronic | ICD-10-CM

## 2023-09-12 DIAGNOSIS — F41.0 PANIC ATTACKS: Chronic | ICD-10-CM

## 2023-09-13 RX ORDER — CLONAZEPAM 0.5 MG/1
0.5 TABLET ORAL 2 TIMES DAILY PRN
Qty: 60 TABLET | Refills: 0 | Status: SHIPPED | OUTPATIENT
Start: 2023-09-13

## 2023-09-25 DIAGNOSIS — I42.2 HYPERTROPHIC CARDIOMYOPATHY: ICD-10-CM

## 2023-09-25 DIAGNOSIS — M54.16 LUMBAR BACK PAIN WITH RADICULOPATHY AFFECTING LEFT LOWER EXTREMITY: ICD-10-CM

## 2023-09-25 RX ORDER — GABAPENTIN 100 MG/1
100 CAPSULE ORAL 3 TIMES DAILY
Qty: 90 CAPSULE | Refills: 0 | Status: SHIPPED | OUTPATIENT
Start: 2023-09-25

## 2023-09-25 RX ORDER — METOPROLOL SUCCINATE 50 MG/1
TABLET, EXTENDED RELEASE ORAL
Qty: 30 TABLET | Refills: 1 | Status: SHIPPED | OUTPATIENT
Start: 2023-09-25

## (undated) DEVICE — LAPAROSCOPIC SMOKE FILTRATION SYSTEM: Brand: PALL LAPAROSHIELD® PLUS LAPAROSCOPIC SMOKE FILTRATION SYSTEM

## (undated) DEVICE — PATIENT RETURN ELECTRODE, SINGLE-USE, CONTACT QUALITY MONITORING, ADULT, WITH 9FT CORD, FOR PATIENTS WEIGING OVER 33LBS. (15KG): Brand: MEGADYNE

## (undated) DEVICE — SUT VIC 2/0 UR6 27IN VCP602H

## (undated) DEVICE — TBG PENCL TELESCP MEGADYNE SMOKE EVAC 10FT

## (undated) DEVICE — CVR SURG EQUIP BND RECTG 36X28

## (undated) DEVICE — TRAP FLD MINIVAC MEGADYNE 100ML

## (undated) DEVICE — GLV SURG SIGNATURE ESSENTIAL PF LTX SZ7

## (undated) DEVICE — ENDOPOUCH RETRIEVER SPECIMEN RETRIEVAL BAGS: Brand: ENDOPOUCH RETRIEVER

## (undated) DEVICE — PK LAP CHOLE LF 60

## (undated) DEVICE — VISUALIZATION SYSTEM: Brand: CLEARIFY

## (undated) DEVICE — ENDOPATH XCEL DILATING TIP TROCARS WITH STABILITY SLEEVES: Brand: ENDOPATH XCEL

## (undated) DEVICE — SUT MONOCRYL 4/0 PS2 27IN Y426H ETY426H

## (undated) DEVICE — LUER-LOK 360°: Brand: CONNECTA, LUER-LOK

## (undated) DEVICE — GOWN,PREVENTION PLUS,XLNG/XXLARGE,STRL: Brand: MEDLINE

## (undated) DEVICE — STERILE POLYISOPRENE POWDER-FREE SURGICAL GLOVES WITH EMOLLIENT COATING: Brand: PROTEXIS

## (undated) DEVICE — MONOPOLAR METZENBAUM SCISSOR TIP, DISPOSABLE: Brand: MONOPOLAR METZENBAUM SCISSOR TIP, DISPOSABLE

## (undated) DEVICE — CLTH CLENS READYCLEANSE PERI CARE PK/5

## (undated) DEVICE — HARMONIC ACE +7 LAPAROSCOPIC SHEARS ADVANCED HEMOSTASIS 5MM DIAMETER 36CM SHAFT LENGTH  FOR USE WITH GRAY HAND PIECE ONLY: Brand: HARMONIC ACE

## (undated) DEVICE — SYR LUERLOK 30CC

## (undated) DEVICE — SOL IRRIG NACL 1000ML

## (undated) DEVICE — THE KUMAR CATHETER®, USED IN CONJUNCTION WITH KUMAR CHOLANGIOGRAPHY® CLAMP, IS MEANT TO PROVIDE A MEANS OF LAPAROSCOPIC CHOLANGIOGRAPHY. IT COMPRISES A TRANSLUCENT TUBING ( 76 CM. LENGTH AND 16 GA. ) THAT CARRIES A 19 GA., 1.25 CM LONG NEEDLE AT THE END. THE KUMAR CATHETER® IS USED TO PUNCTURE THE HARTMANN'S POUCH OF THE GALLBLADDER FOR BILIARY ACCESS AND / OR ASPIRATION. PRODUCT IS LATEX FREE.: Brand: KUMAR CATHETER®

## (undated) DEVICE — PDS II VLT 0 107CM AG ST3: Brand: ENDOLOOP

## (undated) DEVICE — CORE TRUMPET FOR SINGLE SOLUTION BAG: Brand: CORE DYNAMICS

## (undated) DEVICE — BLUNT TIP TROCAR: Brand: AUTO SUTURE

## (undated) DEVICE — GLV SURG SIGNATURE ESSENTIAL PF LTX SZ6.5